# Patient Record
Sex: MALE | Race: WHITE | NOT HISPANIC OR LATINO | URBAN - NONMETROPOLITAN AREA
[De-identification: names, ages, dates, MRNs, and addresses within clinical notes are randomized per-mention and may not be internally consistent; named-entity substitution may affect disease eponyms.]

---

## 2019-01-27 ENCOUNTER — HOSPITAL ENCOUNTER (EMERGENCY)
Facility: HOSPITAL | Age: 39
Discharge: HOME | End: 2019-01-27
Attending: EMERGENCY MEDICINE | Admitting: EMERGENCY MEDICINE

## 2019-01-27 ENCOUNTER — APPOINTMENT (OUTPATIENT)
Dept: GENERAL RADIOLOGY | Facility: HOSPITAL | Age: 39
End: 2019-01-27

## 2019-01-27 ENCOUNTER — APPOINTMENT (OUTPATIENT)
Dept: CT IMAGING | Facility: HOSPITAL | Age: 39
End: 2019-01-27

## 2019-01-27 VITALS
DIASTOLIC BLOOD PRESSURE: 65 MMHG | TEMPERATURE: 98.4 F | HEIGHT: 70 IN | RESPIRATION RATE: 18 BRPM | WEIGHT: 190 LBS | HEART RATE: 71 BPM | BODY MASS INDEX: 27.2 KG/M2 | SYSTOLIC BLOOD PRESSURE: 127 MMHG | OXYGEN SATURATION: 97 %

## 2019-01-27 DIAGNOSIS — R07.9 CHEST PAIN, UNSPECIFIED TYPE: Primary | ICD-10-CM

## 2019-01-27 DIAGNOSIS — I15.9 SECONDARY HYPERTENSION: ICD-10-CM

## 2019-01-27 DIAGNOSIS — R06.00 DYSPNEA, UNSPECIFIED TYPE: ICD-10-CM

## 2019-01-27 LAB
AMPHET+METHAMPHET UR QL: NEGATIVE
ANION GAP SERPL CALCULATED.3IONS-SCNC: 9 MMOL/L (ref 4–13)
APTT PPP: 25.5 SECONDS (ref 24.1–34.8)
BARBITURATES UR QL SCN: NEGATIVE
BASOPHILS # BLD AUTO: 0.03 10*3/MM3 (ref 0–0.2)
BASOPHILS NFR BLD AUTO: 0.5 % (ref 0–2)
BENZODIAZ UR QL SCN: NEGATIVE
BUN BLD-MCNC: 10 MG/DL (ref 5–21)
BUN/CREAT SERPL: 16.7 (ref 7–25)
CALCIUM SPEC-SCNC: 9.2 MG/DL (ref 8.4–10.4)
CANNABINOIDS SERPL QL: NEGATIVE
CHLORIDE SERPL-SCNC: 108 MMOL/L (ref 98–110)
CK SERPL-CCNC: 235 U/L (ref 0–203)
CO2 SERPL-SCNC: 24 MMOL/L (ref 24–31)
COCAINE UR QL: NEGATIVE
CREAT BLD-MCNC: 0.6 MG/DL (ref 0.5–1.4)
D DIMER PPP FEU-MCNC: 0.25 MG/L (FEU) (ref 0–0.5)
DEPRECATED RDW RBC AUTO: 38.2 FL (ref 40–54)
EOSINOPHIL # BLD AUTO: 0.15 10*3/MM3 (ref 0–0.7)
EOSINOPHIL NFR BLD AUTO: 2.6 % (ref 0–4)
ERYTHROCYTE [DISTWIDTH] IN BLOOD BY AUTOMATED COUNT: 12.4 % (ref 12–15)
GFR SERPL CREATININE-BSD FRML MDRD: 148 ML/MIN/1.73
GLUCOSE BLD-MCNC: 97 MG/DL (ref 70–100)
HCT VFR BLD AUTO: 36.7 % (ref 40–52)
HGB BLD-MCNC: 12.8 G/DL (ref 14–18)
INR PPP: 0.97 (ref 0.91–1.09)
LYMPHOCYTES # BLD AUTO: 0.88 10*3/MM3 (ref 0.72–4.86)
LYMPHOCYTES NFR BLD AUTO: 15.3 % (ref 15–45)
MAGNESIUM SERPL-MCNC: 2 MG/DL (ref 1.4–2.2)
MCH RBC QN AUTO: 29.7 PG (ref 28–32)
MCHC RBC AUTO-ENTMCNC: 34.9 G/DL (ref 33–36)
MCV RBC AUTO: 85.2 FL (ref 82–95)
METHADONE UR QL SCN: NEGATIVE
MONOCYTES # BLD AUTO: 0.38 10*3/MM3 (ref 0.19–1.3)
MONOCYTES NFR BLD AUTO: 6.6 % (ref 4–12)
MYOGLOBIN SERPL-MCNC: 23.1 NG/ML (ref 0–110)
NEUTROPHILS # BLD AUTO: 4.31 10*3/MM3 (ref 1.87–8.4)
NEUTROPHILS NFR BLD AUTO: 74.7 % (ref 39–78)
NT-PROBNP SERPL-MCNC: 25 PG/ML (ref 0–450)
OPIATES UR QL: NEGATIVE
PCP UR QL SCN: NEGATIVE
PLATELET # BLD AUTO: 141 10*3/MM3 (ref 130–400)
PMV BLD AUTO: 13 FL (ref 6–12)
POTASSIUM BLD-SCNC: 3.8 MMOL/L (ref 3.5–5.3)
PROTHROMBIN TIME: 13.2 SECONDS (ref 11.9–14.6)
RBC # BLD AUTO: 4.31 10*6/MM3 (ref 4.8–5.9)
SODIUM BLD-SCNC: 141 MMOL/L (ref 135–145)
T4 FREE SERPL-MCNC: 1.23 NG/DL (ref 0.78–2.19)
TROPONIN I SERPL-MCNC: <0.012 NG/ML (ref 0–0.03)
TROPONIN I SERPL-MCNC: <0.012 NG/ML (ref 0–0.03)
TSH SERPL DL<=0.05 MIU/L-ACNC: 0.54 MIU/ML (ref 0.47–4.68)
WBC NRBC COR # BLD: 5.77 10*3/MM3 (ref 4.8–10.8)

## 2019-01-27 PROCEDURE — 80307 DRUG TEST PRSMV CHEM ANLYZR: CPT | Performed by: EMERGENCY MEDICINE

## 2019-01-27 PROCEDURE — 84443 ASSAY THYROID STIM HORMONE: CPT | Performed by: EMERGENCY MEDICINE

## 2019-01-27 PROCEDURE — 99284 EMERGENCY DEPT VISIT MOD MDM: CPT

## 2019-01-27 PROCEDURE — 83874 ASSAY OF MYOGLOBIN: CPT | Performed by: EMERGENCY MEDICINE

## 2019-01-27 PROCEDURE — 85730 THROMBOPLASTIN TIME PARTIAL: CPT | Performed by: EMERGENCY MEDICINE

## 2019-01-27 PROCEDURE — 84484 ASSAY OF TROPONIN QUANT: CPT | Performed by: EMERGENCY MEDICINE

## 2019-01-27 PROCEDURE — 83735 ASSAY OF MAGNESIUM: CPT | Performed by: EMERGENCY MEDICINE

## 2019-01-27 PROCEDURE — 93010 ELECTROCARDIOGRAM REPORT: CPT | Performed by: INTERNAL MEDICINE

## 2019-01-27 PROCEDURE — 70450 CT HEAD/BRAIN W/O DYE: CPT

## 2019-01-27 PROCEDURE — 80048 BASIC METABOLIC PNL TOTAL CA: CPT | Performed by: EMERGENCY MEDICINE

## 2019-01-27 PROCEDURE — 82550 ASSAY OF CK (CPK): CPT | Performed by: EMERGENCY MEDICINE

## 2019-01-27 PROCEDURE — 71045 X-RAY EXAM CHEST 1 VIEW: CPT

## 2019-01-27 PROCEDURE — 84439 ASSAY OF FREE THYROXINE: CPT | Performed by: EMERGENCY MEDICINE

## 2019-01-27 PROCEDURE — 85025 COMPLETE CBC W/AUTO DIFF WBC: CPT | Performed by: EMERGENCY MEDICINE

## 2019-01-27 PROCEDURE — 93005 ELECTROCARDIOGRAM TRACING: CPT | Performed by: EMERGENCY MEDICINE

## 2019-01-27 PROCEDURE — 36415 COLL VENOUS BLD VENIPUNCTURE: CPT | Performed by: EMERGENCY MEDICINE

## 2019-01-27 PROCEDURE — 85610 PROTHROMBIN TIME: CPT | Performed by: EMERGENCY MEDICINE

## 2019-01-27 PROCEDURE — 85379 FIBRIN DEGRADATION QUANT: CPT | Performed by: EMERGENCY MEDICINE

## 2019-01-27 PROCEDURE — 83880 ASSAY OF NATRIURETIC PEPTIDE: CPT | Performed by: EMERGENCY MEDICINE

## 2019-01-27 RX ORDER — SODIUM CHLORIDE 0.9 % (FLUSH) 0.9 %
10 SYRINGE (ML) INJECTION AS NEEDED
Status: DISCONTINUED | OUTPATIENT
Start: 2019-01-27 | End: 2019-01-27 | Stop reason: HOSPADM

## 2024-06-29 ENCOUNTER — APPOINTMENT (OUTPATIENT)
Dept: GENERAL RADIOLOGY | Facility: HOSPITAL | Age: 44
End: 2024-06-29
Payer: COMMERCIAL

## 2024-06-29 ENCOUNTER — APPOINTMENT (OUTPATIENT)
Dept: CT IMAGING | Facility: HOSPITAL | Age: 44
End: 2024-06-29
Payer: COMMERCIAL

## 2024-06-29 ENCOUNTER — HOSPITAL ENCOUNTER (OUTPATIENT)
Facility: HOSPITAL | Age: 44
Setting detail: OBSERVATION
Discharge: HOME | End: 2024-07-02
Attending: EMERGENCY MEDICINE | Admitting: INTERNAL MEDICINE
Payer: COMMERCIAL

## 2024-06-29 DIAGNOSIS — R07.9 CHEST PAIN, UNSPECIFIED TYPE: Primary | ICD-10-CM

## 2024-06-29 DIAGNOSIS — R79.89 ELEVATED TROPONIN: ICD-10-CM

## 2024-06-29 DIAGNOSIS — I10 PRIMARY HYPERTENSION: ICD-10-CM

## 2024-06-29 DIAGNOSIS — Z78.9 ALCOHOL USE: ICD-10-CM

## 2024-06-29 DIAGNOSIS — F10.10 ALCOHOL ABUSE: ICD-10-CM

## 2024-06-29 PROBLEM — I25.110 CORONARY ARTERY DISEASE INVOLVING NATIVE CORONARY ARTERY WITH UNSTABLE ANGINA PECTORIS: Status: ACTIVE | Noted: 2024-06-29

## 2024-06-29 PROBLEM — F43.10 PTSD (POST-TRAUMATIC STRESS DISORDER): Status: ACTIVE | Noted: 2024-06-29

## 2024-06-29 PROBLEM — F41.1 GAD (GENERALIZED ANXIETY DISORDER): Status: ACTIVE | Noted: 2024-06-29

## 2024-06-29 PROBLEM — G40.909 SEIZURE DISORDER: Status: ACTIVE | Noted: 2024-06-29

## 2024-06-29 PROBLEM — E11.65 TYPE 2 DIABETES MELLITUS WITH HYPERGLYCEMIA, WITH LONG-TERM CURRENT USE OF INSULIN: Status: ACTIVE | Noted: 2024-06-29

## 2024-06-29 PROBLEM — S06.9XAA TBI (TRAUMATIC BRAIN INJURY): Status: ACTIVE | Noted: 2024-06-29

## 2024-06-29 PROBLEM — Z79.4 TYPE 2 DIABETES MELLITUS WITH HYPERGLYCEMIA, WITH LONG-TERM CURRENT USE OF INSULIN: Status: ACTIVE | Noted: 2024-06-29

## 2024-06-29 PROBLEM — Z91.199 HISTORY OF NONCOMPLIANCE WITH MEDICAL TREATMENT: Status: ACTIVE | Noted: 2024-06-29

## 2024-06-29 LAB
ALBUMIN SERPL-MCNC: 4 G/DL (ref 3.5–5.2)
ALBUMIN/GLOB SERPL: 1.4 G/DL
ALP SERPL-CCNC: 69 U/L (ref 39–117)
ALT SERPL W P-5'-P-CCNC: 52 U/L (ref 1–41)
AMPHET+METHAMPHET UR QL: NEGATIVE
AMPHETAMINES UR QL: NEGATIVE
ANION GAP SERPL CALCULATED.3IONS-SCNC: 17 MMOL/L (ref 5–15)
AST SERPL-CCNC: 48 U/L (ref 1–40)
BARBITURATES UR QL SCN: NEGATIVE
BASOPHILS # BLD AUTO: 0.06 10*3/MM3 (ref 0–0.2)
BASOPHILS NFR BLD AUTO: 1.3 % (ref 0–1.5)
BENZODIAZ UR QL SCN: NEGATIVE
BILIRUB SERPL-MCNC: 0.3 MG/DL (ref 0–1.2)
BUN SERPL-MCNC: 11 MG/DL (ref 6–20)
BUN/CREAT SERPL: 12.5 (ref 7–25)
BUPRENORPHINE SERPL-MCNC: NEGATIVE NG/ML
CALCIUM SPEC-SCNC: 8.6 MG/DL (ref 8.6–10.5)
CANNABINOIDS SERPL QL: NEGATIVE
CHLORIDE SERPL-SCNC: 98 MMOL/L (ref 98–107)
CO2 SERPL-SCNC: 20 MMOL/L (ref 22–29)
COCAINE UR QL: NEGATIVE
CREAT SERPL-MCNC: 0.88 MG/DL (ref 0.76–1.27)
D DIMER PPP FEU-MCNC: 0.27 MCGFEU/ML (ref 0–0.5)
DEPRECATED RDW RBC AUTO: 49.8 FL (ref 37–54)
EGFRCR SERPLBLD CKD-EPI 2021: 107.4 ML/MIN/1.73
EOSINOPHIL # BLD AUTO: 0.08 10*3/MM3 (ref 0–0.4)
EOSINOPHIL NFR BLD AUTO: 1.7 % (ref 0.3–6.2)
ERYTHROCYTE [DISTWIDTH] IN BLOOD BY AUTOMATED COUNT: 14.5 % (ref 12.3–15.4)
ETHANOL BLD-MCNC: 194 MG/DL (ref 0–10)
FENTANYL UR-MCNC: NEGATIVE NG/ML
GEN 5 2HR TROPONIN T REFLEX: 33 NG/L
GLOBULIN UR ELPH-MCNC: 2.9 GM/DL
GLUCOSE BLDC GLUCOMTR-MCNC: 278 MG/DL (ref 70–130)
GLUCOSE SERPL-MCNC: 422 MG/DL (ref 65–99)
HAV IGM SERPL QL IA: NORMAL
HBA1C MFR BLD: 7.9 % (ref 4.8–5.6)
HBV CORE IGM SERPL QL IA: NORMAL
HBV SURFACE AG SERPL QL IA: NORMAL
HCT VFR BLD AUTO: 50.4 % (ref 37.5–51)
HCV AB SER QL: NORMAL
HGB BLD-MCNC: 16.8 G/DL (ref 13–17.7)
HOLD SPECIMEN: NORMAL
IMM GRANULOCYTES # BLD AUTO: 0.05 10*3/MM3 (ref 0–0.05)
IMM GRANULOCYTES NFR BLD AUTO: 1.1 % (ref 0–0.5)
LIPASE SERPL-CCNC: 24 U/L (ref 13–60)
LYMPHOCYTES # BLD AUTO: 1.38 10*3/MM3 (ref 0.7–3.1)
LYMPHOCYTES NFR BLD AUTO: 29 % (ref 19.6–45.3)
MCH RBC QN AUTO: 31.5 PG (ref 26.6–33)
MCHC RBC AUTO-ENTMCNC: 33.3 G/DL (ref 31.5–35.7)
MCV RBC AUTO: 94.6 FL (ref 79–97)
METHADONE UR QL SCN: NEGATIVE
MONOCYTES # BLD AUTO: 0.34 10*3/MM3 (ref 0.1–0.9)
MONOCYTES NFR BLD AUTO: 7.1 % (ref 5–12)
NEUTROPHILS NFR BLD AUTO: 2.85 10*3/MM3 (ref 1.7–7)
NEUTROPHILS NFR BLD AUTO: 59.8 % (ref 42.7–76)
NRBC BLD AUTO-RTO: 0 /100 WBC (ref 0–0.2)
NT-PROBNP SERPL-MCNC: <36 PG/ML (ref 0–450)
OPIATES UR QL: NEGATIVE
OXYCODONE UR QL SCN: NEGATIVE
PCP UR QL SCN: NEGATIVE
PHENYTOIN SERPL-MCNC: <0.8 MCG/ML (ref 10–20)
PLATELET # BLD AUTO: 175 10*3/MM3 (ref 140–450)
PMV BLD AUTO: 12.1 FL (ref 6–12)
POTASSIUM SERPL-SCNC: 4.6 MMOL/L (ref 3.5–5.2)
PROT SERPL-MCNC: 6.9 G/DL (ref 6–8.5)
RBC # BLD AUTO: 5.33 10*6/MM3 (ref 4.14–5.8)
SODIUM SERPL-SCNC: 135 MMOL/L (ref 136–145)
TRICYCLICS UR QL SCN: NEGATIVE
TROPONIN T DELTA: -5 NG/L
TROPONIN T SERPL HS-MCNC: 38 NG/L
TSH SERPL DL<=0.05 MIU/L-ACNC: 2.17 UIU/ML (ref 0.27–4.2)
WBC NRBC COR # BLD AUTO: 4.76 10*3/MM3 (ref 3.4–10.8)
WHOLE BLOOD HOLD COAG: NORMAL
WHOLE BLOOD HOLD SPECIMEN: NORMAL

## 2024-06-29 PROCEDURE — 25010000002 ONDANSETRON PER 1 MG: Performed by: EMERGENCY MEDICINE

## 2024-06-29 PROCEDURE — 82077 ASSAY SPEC XCP UR&BREATH IA: CPT | Performed by: EMERGENCY MEDICINE

## 2024-06-29 PROCEDURE — 25010000002 HEPARIN (PORCINE) PER 1000 UNITS: Performed by: NURSE PRACTITIONER

## 2024-06-29 PROCEDURE — 25010000002 LORAZEPAM PER 2 MG: Performed by: EMERGENCY MEDICINE

## 2024-06-29 PROCEDURE — 93005 ELECTROCARDIOGRAM TRACING: CPT | Performed by: EMERGENCY MEDICINE

## 2024-06-29 PROCEDURE — 80307 DRUG TEST PRSMV CHEM ANLYZR: CPT | Performed by: EMERGENCY MEDICINE

## 2024-06-29 PROCEDURE — 71275 CT ANGIOGRAPHY CHEST: CPT

## 2024-06-29 PROCEDURE — 96374 THER/PROPH/DIAG INJ IV PUSH: CPT

## 2024-06-29 PROCEDURE — 25010000002 HYDROMORPHONE PER 4 MG: Performed by: NURSE PRACTITIONER

## 2024-06-29 PROCEDURE — 85379 FIBRIN DEGRADATION QUANT: CPT | Performed by: EMERGENCY MEDICINE

## 2024-06-29 PROCEDURE — G0378 HOSPITAL OBSERVATION PER HR: HCPCS

## 2024-06-29 PROCEDURE — 99285 EMERGENCY DEPT VISIT HI MDM: CPT

## 2024-06-29 PROCEDURE — 80053 COMPREHEN METABOLIC PANEL: CPT | Performed by: EMERGENCY MEDICINE

## 2024-06-29 PROCEDURE — 25010000002 THIAMINE HCL 200 MG/2ML SOLUTION: Performed by: NURSE PRACTITIONER

## 2024-06-29 PROCEDURE — 80185 ASSAY OF PHENYTOIN TOTAL: CPT | Performed by: FAMILY MEDICINE

## 2024-06-29 PROCEDURE — 25510000001 IOPAMIDOL PER 1 ML: Performed by: EMERGENCY MEDICINE

## 2024-06-29 PROCEDURE — 25010000002 ONDANSETRON PER 1 MG: Performed by: NURSE PRACTITIONER

## 2024-06-29 PROCEDURE — 25010000002 MIDAZOLAM PER 1 MG: Performed by: NURSE PRACTITIONER

## 2024-06-29 PROCEDURE — 83690 ASSAY OF LIPASE: CPT | Performed by: EMERGENCY MEDICINE

## 2024-06-29 PROCEDURE — 82948 REAGENT STRIP/BLOOD GLUCOSE: CPT

## 2024-06-29 PROCEDURE — 96375 TX/PRO/DX INJ NEW DRUG ADDON: CPT

## 2024-06-29 PROCEDURE — 84443 ASSAY THYROID STIM HORMONE: CPT | Performed by: NURSE PRACTITIONER

## 2024-06-29 PROCEDURE — 84484 ASSAY OF TROPONIN QUANT: CPT | Performed by: EMERGENCY MEDICINE

## 2024-06-29 PROCEDURE — 96376 TX/PRO/DX INJ SAME DRUG ADON: CPT

## 2024-06-29 PROCEDURE — 99222 1ST HOSP IP/OBS MODERATE 55: CPT | Performed by: NURSE PRACTITIONER

## 2024-06-29 PROCEDURE — 83880 ASSAY OF NATRIURETIC PEPTIDE: CPT | Performed by: EMERGENCY MEDICINE

## 2024-06-29 PROCEDURE — 25010000002 MORPHINE PER 10 MG: Performed by: EMERGENCY MEDICINE

## 2024-06-29 PROCEDURE — 85025 COMPLETE CBC W/AUTO DIFF WBC: CPT | Performed by: EMERGENCY MEDICINE

## 2024-06-29 PROCEDURE — 63710000001 INSULIN LISPRO (HUMAN) PER 5 UNITS: Performed by: NURSE PRACTITIONER

## 2024-06-29 PROCEDURE — 80074 ACUTE HEPATITIS PANEL: CPT | Performed by: NURSE PRACTITIONER

## 2024-06-29 PROCEDURE — 25810000003 SODIUM CHLORIDE 0.9 % SOLUTION: Performed by: EMERGENCY MEDICINE

## 2024-06-29 PROCEDURE — 36415 COLL VENOUS BLD VENIPUNCTURE: CPT

## 2024-06-29 PROCEDURE — 63710000001 INSULIN GLARGINE PER 5 UNITS: Performed by: NURSE PRACTITIONER

## 2024-06-29 PROCEDURE — 83036 HEMOGLOBIN GLYCOSYLATED A1C: CPT | Performed by: FAMILY MEDICINE

## 2024-06-29 PROCEDURE — 71045 X-RAY EXAM CHEST 1 VIEW: CPT

## 2024-06-29 PROCEDURE — 63710000001 INSULIN REGULAR HUMAN PER 5 UNITS: Performed by: EMERGENCY MEDICINE

## 2024-06-29 RX ORDER — ONDANSETRON 2 MG/ML
4 INJECTION INTRAMUSCULAR; INTRAVENOUS ONCE AS NEEDED
Status: COMPLETED | OUTPATIENT
Start: 2024-06-29 | End: 2024-06-29

## 2024-06-29 RX ORDER — LORAZEPAM 1 MG/1
1 TABLET ORAL
Status: DISCONTINUED | OUTPATIENT
Start: 2024-06-29 | End: 2024-07-02 | Stop reason: HOSPADM

## 2024-06-29 RX ORDER — SODIUM CHLORIDE 0.9 % (FLUSH) 0.9 %
10 SYRINGE (ML) INJECTION AS NEEDED
Status: DISCONTINUED | OUTPATIENT
Start: 2024-06-29 | End: 2024-07-02 | Stop reason: HOSPADM

## 2024-06-29 RX ORDER — ONDANSETRON 2 MG/ML
4 INJECTION INTRAMUSCULAR; INTRAVENOUS EVERY 6 HOURS PRN
Status: DISCONTINUED | OUTPATIENT
Start: 2024-06-29 | End: 2024-07-02 | Stop reason: HOSPADM

## 2024-06-29 RX ORDER — HYDROMORPHONE HYDROCHLORIDE 1 MG/ML
0.5 INJECTION, SOLUTION INTRAMUSCULAR; INTRAVENOUS; SUBCUTANEOUS ONCE
Status: COMPLETED | OUTPATIENT
Start: 2024-06-29 | End: 2024-06-29

## 2024-06-29 RX ORDER — INSULIN LISPRO 100 [IU]/ML
2-9 INJECTION, SOLUTION INTRAVENOUS; SUBCUTANEOUS
Status: DISCONTINUED | OUTPATIENT
Start: 2024-06-29 | End: 2024-07-02 | Stop reason: HOSPADM

## 2024-06-29 RX ORDER — PHENYTOIN SODIUM 100 MG/1
100 CAPSULE, EXTENDED RELEASE ORAL EVERY 6 HOURS SCHEDULED
Status: DISCONTINUED | OUTPATIENT
Start: 2024-06-30 | End: 2024-07-02 | Stop reason: HOSPADM

## 2024-06-29 RX ORDER — CLONIDINE HYDROCHLORIDE 0.1 MG/1
0.1 TABLET ORAL EVERY 4 HOURS PRN
Status: DISCONTINUED | OUTPATIENT
Start: 2024-06-29 | End: 2024-07-02 | Stop reason: HOSPADM

## 2024-06-29 RX ORDER — PHENYTOIN SODIUM 100 MG/1
400 CAPSULE, EXTENDED RELEASE ORAL ONCE
Status: COMPLETED | OUTPATIENT
Start: 2024-06-29 | End: 2024-06-29

## 2024-06-29 RX ORDER — NEBIVOLOL 10 MG/1
10 TABLET ORAL DAILY
Status: DISCONTINUED | OUTPATIENT
Start: 2024-06-30 | End: 2024-07-02 | Stop reason: HOSPADM

## 2024-06-29 RX ORDER — BISACODYL 5 MG/1
5 TABLET, DELAYED RELEASE ORAL DAILY PRN
Status: DISCONTINUED | OUTPATIENT
Start: 2024-06-29 | End: 2024-07-02 | Stop reason: HOSPADM

## 2024-06-29 RX ORDER — HEPARIN SODIUM 5000 [USP'U]/ML
5000 INJECTION, SOLUTION INTRAVENOUS; SUBCUTANEOUS EVERY 8 HOURS SCHEDULED
Status: DISCONTINUED | OUTPATIENT
Start: 2024-06-29 | End: 2024-07-02 | Stop reason: HOSPADM

## 2024-06-29 RX ORDER — IBUPROFEN 600 MG/1
1 TABLET ORAL
Status: DISCONTINUED | OUTPATIENT
Start: 2024-06-29 | End: 2024-07-02 | Stop reason: HOSPADM

## 2024-06-29 RX ORDER — SODIUM CHLORIDE 9 MG/ML
40 INJECTION, SOLUTION INTRAVENOUS AS NEEDED
Status: DISCONTINUED | OUTPATIENT
Start: 2024-06-29 | End: 2024-07-02 | Stop reason: HOSPADM

## 2024-06-29 RX ORDER — HYDROXYZINE HYDROCHLORIDE 25 MG/1
50 TABLET, FILM COATED ORAL EVERY 6 HOURS PRN
Status: DISCONTINUED | OUTPATIENT
Start: 2024-06-29 | End: 2024-07-02 | Stop reason: HOSPADM

## 2024-06-29 RX ORDER — LORAZEPAM 1 MG/1
2 TABLET ORAL
Status: DISCONTINUED | OUTPATIENT
Start: 2024-06-29 | End: 2024-07-02 | Stop reason: HOSPADM

## 2024-06-29 RX ORDER — MIDAZOLAM HYDROCHLORIDE 1 MG/ML
4 INJECTION INTRAMUSCULAR; INTRAVENOUS
Status: DISCONTINUED | OUTPATIENT
Start: 2024-06-29 | End: 2024-07-02 | Stop reason: HOSPADM

## 2024-06-29 RX ORDER — UREA 10 %
10 LOTION (ML) TOPICAL NIGHTLY PRN
Status: DISCONTINUED | OUTPATIENT
Start: 2024-06-29 | End: 2024-07-02 | Stop reason: HOSPADM

## 2024-06-29 RX ORDER — MIDAZOLAM HYDROCHLORIDE 1 MG/ML
2 INJECTION INTRAMUSCULAR; INTRAVENOUS
Status: DISCONTINUED | OUTPATIENT
Start: 2024-06-29 | End: 2024-07-02 | Stop reason: HOSPADM

## 2024-06-29 RX ORDER — ACETAMINOPHEN 650 MG/1
650 SUPPOSITORY RECTAL EVERY 4 HOURS PRN
Status: DISCONTINUED | OUTPATIENT
Start: 2024-06-29 | End: 2024-07-02 | Stop reason: HOSPADM

## 2024-06-29 RX ORDER — ASPIRIN 81 MG/1
324 TABLET, CHEWABLE ORAL ONCE
Status: COMPLETED | OUTPATIENT
Start: 2024-06-29 | End: 2024-06-29

## 2024-06-29 RX ORDER — DEXTROSE MONOHYDRATE 25 G/50ML
25 INJECTION, SOLUTION INTRAVENOUS
Status: DISCONTINUED | OUTPATIENT
Start: 2024-06-29 | End: 2024-07-02 | Stop reason: HOSPADM

## 2024-06-29 RX ORDER — NICOTINE POLACRILEX 4 MG
15 LOZENGE BUCCAL
Status: DISCONTINUED | OUTPATIENT
Start: 2024-06-29 | End: 2024-07-02 | Stop reason: HOSPADM

## 2024-06-29 RX ORDER — DIAZEPAM 5 MG/1
5 TABLET ORAL EVERY 6 HOURS SCHEDULED
Status: DISCONTINUED | OUTPATIENT
Start: 2024-06-30 | End: 2024-06-29

## 2024-06-29 RX ORDER — AMOXICILLIN 250 MG
2 CAPSULE ORAL 2 TIMES DAILY
Status: DISCONTINUED | OUTPATIENT
Start: 2024-06-29 | End: 2024-07-02 | Stop reason: HOSPADM

## 2024-06-29 RX ORDER — ATORVASTATIN CALCIUM 20 MG/1
20 TABLET, FILM COATED ORAL NIGHTLY
Status: DISCONTINUED | OUTPATIENT
Start: 2024-06-29 | End: 2024-07-02 | Stop reason: HOSPADM

## 2024-06-29 RX ORDER — ASPIRIN 81 MG/1
81 TABLET, CHEWABLE ORAL DAILY
COMMUNITY

## 2024-06-29 RX ORDER — DIAZEPAM 5 MG/1
2.5 TABLET ORAL EVERY 6 HOURS
Status: DISCONTINUED | OUTPATIENT
Start: 2024-07-02 | End: 2024-06-29

## 2024-06-29 RX ORDER — MORPHINE SULFATE 2 MG/ML
2 INJECTION, SOLUTION INTRAMUSCULAR; INTRAVENOUS
Status: COMPLETED | OUTPATIENT
Start: 2024-06-29 | End: 2024-06-29

## 2024-06-29 RX ORDER — PHENYTOIN SODIUM 100 MG/1
100 CAPSULE, EXTENDED RELEASE ORAL 4 TIMES DAILY
COMMUNITY
End: 2024-07-02 | Stop reason: HOSPADM

## 2024-06-29 RX ORDER — MULTIPLE VITAMINS W/ MINERALS TAB 9MG-400MCG
1 TAB ORAL DAILY
Status: DISCONTINUED | OUTPATIENT
Start: 2024-06-30 | End: 2024-07-02 | Stop reason: HOSPADM

## 2024-06-29 RX ORDER — SODIUM CHLORIDE 0.9 % (FLUSH) 0.9 %
10 SYRINGE (ML) INJECTION EVERY 12 HOURS SCHEDULED
Status: DISCONTINUED | OUTPATIENT
Start: 2024-06-29 | End: 2024-07-02 | Stop reason: HOSPADM

## 2024-06-29 RX ORDER — POLYETHYLENE GLYCOL 3350 17 G/17G
17 POWDER, FOR SOLUTION ORAL DAILY PRN
Status: DISCONTINUED | OUTPATIENT
Start: 2024-06-29 | End: 2024-07-02 | Stop reason: HOSPADM

## 2024-06-29 RX ORDER — THIAMINE HYDROCHLORIDE 100 MG/ML
200 INJECTION, SOLUTION INTRAMUSCULAR; INTRAVENOUS EVERY 8 HOURS SCHEDULED
Status: DISCONTINUED | OUTPATIENT
Start: 2024-06-29 | End: 2024-07-02 | Stop reason: HOSPADM

## 2024-06-29 RX ORDER — FOLIC ACID 1 MG/1
1 TABLET ORAL DAILY
Status: DISCONTINUED | OUTPATIENT
Start: 2024-06-30 | End: 2024-07-02 | Stop reason: HOSPADM

## 2024-06-29 RX ORDER — DIAZEPAM 5 MG/1
2.5 TABLET ORAL EVERY 6 HOURS
Status: DISCONTINUED | OUTPATIENT
Start: 2024-07-01 | End: 2024-06-30

## 2024-06-29 RX ORDER — BISACODYL 10 MG
10 SUPPOSITORY, RECTAL RECTAL DAILY PRN
Status: DISCONTINUED | OUTPATIENT
Start: 2024-06-29 | End: 2024-07-02 | Stop reason: HOSPADM

## 2024-06-29 RX ORDER — ACETAMINOPHEN 325 MG/1
650 TABLET ORAL EVERY 4 HOURS PRN
Status: DISCONTINUED | OUTPATIENT
Start: 2024-06-29 | End: 2024-07-02 | Stop reason: HOSPADM

## 2024-06-29 RX ORDER — ASPIRIN 81 MG/1
81 TABLET, CHEWABLE ORAL DAILY
Status: DISCONTINUED | OUTPATIENT
Start: 2024-06-30 | End: 2024-07-02 | Stop reason: HOSPADM

## 2024-06-29 RX ORDER — LORAZEPAM 2 MG/ML
1 INJECTION INTRAMUSCULAR ONCE
Status: COMPLETED | OUTPATIENT
Start: 2024-06-29 | End: 2024-06-29

## 2024-06-29 RX ORDER — PHENYTOIN SODIUM 100 MG/1
300 CAPSULE, EXTENDED RELEASE ORAL
Status: COMPLETED | OUTPATIENT
Start: 2024-06-29 | End: 2024-06-30

## 2024-06-29 RX ORDER — ACETAMINOPHEN 160 MG/5ML
650 SOLUTION ORAL EVERY 4 HOURS PRN
Status: DISCONTINUED | OUTPATIENT
Start: 2024-06-29 | End: 2024-07-02 | Stop reason: HOSPADM

## 2024-06-29 RX ORDER — ONDANSETRON 4 MG/1
4 TABLET, ORALLY DISINTEGRATING ORAL EVERY 6 HOURS PRN
Status: DISCONTINUED | OUTPATIENT
Start: 2024-06-29 | End: 2024-07-02 | Stop reason: HOSPADM

## 2024-06-29 RX ORDER — OXYCODONE HYDROCHLORIDE AND ACETAMINOPHEN 5; 325 MG/1; MG/1
1 TABLET ORAL EVERY 4 HOURS PRN
Status: DISCONTINUED | OUTPATIENT
Start: 2024-06-29 | End: 2024-06-30

## 2024-06-29 RX ORDER — DIAZEPAM 5 MG/1
5 TABLET ORAL EVERY 6 HOURS SCHEDULED
Status: DISCONTINUED | OUTPATIENT
Start: 2024-06-29 | End: 2024-06-30

## 2024-06-29 RX ADMIN — EXTENDED PHENYTOIN SODIUM 400 MG: 100 CAPSULE ORAL at 21:05

## 2024-06-29 RX ADMIN — ASPIRIN 324 MG: 81 TABLET, CHEWABLE ORAL at 13:52

## 2024-06-29 RX ADMIN — NITROGLYCERIN 1 INCH: 20 OINTMENT TOPICAL at 17:09

## 2024-06-29 RX ADMIN — ONDANSETRON 4 MG: 2 INJECTION INTRAMUSCULAR; INTRAVENOUS at 14:35

## 2024-06-29 RX ADMIN — MORPHINE SULFATE 2 MG: 2 INJECTION, SOLUTION INTRAMUSCULAR; INTRAVENOUS at 15:26

## 2024-06-29 RX ADMIN — HYDROMORPHONE HYDROCHLORIDE 0.5 MG: 1 INJECTION, SOLUTION INTRAMUSCULAR; INTRAVENOUS; SUBCUTANEOUS at 20:30

## 2024-06-29 RX ADMIN — SODIUM CHLORIDE 500 ML: 9 INJECTION, SOLUTION INTRAVENOUS at 15:00

## 2024-06-29 RX ADMIN — IOPAMIDOL 80 ML: 755 INJECTION, SOLUTION INTRAVENOUS at 15:38

## 2024-06-29 RX ADMIN — ONDANSETRON 4 MG: 2 INJECTION INTRAMUSCULAR; INTRAVENOUS at 22:41

## 2024-06-29 RX ADMIN — DIAZEPAM 5 MG: 5 TABLET ORAL at 21:05

## 2024-06-29 RX ADMIN — MORPHINE SULFATE 2 MG: 2 INJECTION, SOLUTION INTRAMUSCULAR; INTRAVENOUS at 14:35

## 2024-06-29 RX ADMIN — THIAMINE HYDROCHLORIDE 200 MG: 100 INJECTION, SOLUTION INTRAMUSCULAR; INTRAVENOUS at 21:04

## 2024-06-29 RX ADMIN — MORPHINE SULFATE 2 MG: 2 INJECTION, SOLUTION INTRAMUSCULAR; INTRAVENOUS at 17:10

## 2024-06-29 RX ADMIN — LORAZEPAM 1 MG: 2 INJECTION INTRAMUSCULAR; INTRAVENOUS at 14:12

## 2024-06-29 RX ADMIN — INSULIN GLARGINE 10 UNITS: 100 INJECTION, SOLUTION SUBCUTANEOUS at 21:05

## 2024-06-29 RX ADMIN — INSULIN HUMAN 6 UNITS: 100 INJECTION, SOLUTION PARENTERAL at 15:07

## 2024-06-29 RX ADMIN — Medication 10 ML: at 21:08

## 2024-06-29 RX ADMIN — INSULIN LISPRO 6 UNITS: 100 INJECTION, SOLUTION INTRAVENOUS; SUBCUTANEOUS at 21:05

## 2024-06-29 RX ADMIN — MIDAZOLAM HYDROCHLORIDE 2 MG: 1 INJECTION, SOLUTION INTRAMUSCULAR; INTRAVENOUS at 22:41

## 2024-06-29 RX ADMIN — EXTENDED PHENYTOIN SODIUM 300 MG: 100 CAPSULE ORAL at 23:29

## 2024-06-29 RX ADMIN — HEPARIN SODIUM 5000 UNITS: 5000 INJECTION INTRAVENOUS; SUBCUTANEOUS at 21:04

## 2024-06-29 RX ADMIN — ATORVASTATIN CALCIUM 20 MG: 20 TABLET, FILM COATED ORAL at 21:05

## 2024-06-29 RX ADMIN — SENNOSIDES AND DOCUSATE SODIUM 2 TABLET: 50; 8.6 TABLET ORAL at 21:05

## 2024-06-29 NOTE — ED PROVIDER NOTES
Subjective   History of Present Illness  Patient is a pleasant 44-year-old male with history of coronary disease, stents, PTSD is secondary to war injuries, and seizures.  He is prescribed Dilantin Xanax, and admits to a long history of alcoholism and anabolic steroid use.  Currently he is on multiple anabolic steroids and testosterone.  He is visiting from [place].  States that 2 weeks ago, upon arrival in the [place] decided to try to clean up by his wife and stopped taking his Xanax and also stopped taking his Dilantin.  Continue to drink alcohol but is trying to stop drinking alcohol also.  Over the last 1 days developed a left-sided chest pain and just a general unwell feeling.  It is described as a clamp sensation on his left chest.  It is worse with certain movements and also with a deep inspiration.  Does have history of coronary disease with 2 stents placed in the distant past.  Denies fever, chills, abdominal pain, vomiting, diarrhea, or other acute complaints.      Review of Systems   All other systems reviewed and are negative.      Past Medical History:   Diagnosis Date    Acute renal failure (ARF)     Hearing loss     Myocardial infarction     Night terror     PTSD (post-traumatic stress disorder)     Seizures        No Known Allergies    Past Surgical History:   Procedure Laterality Date    CORONARY ANGIOPLASTY WITH STENT PLACEMENT      FRACTURE SURGERY         History reviewed. No pertinent family history.    Social History     Socioeconomic History    Marital status: Single   Tobacco Use    Smoking status: Never    Smokeless tobacco: Current     Types: Snuff   Vaping Use    Vaping status: Never Used   Substance and Sexual Activity    Alcohol use: Yes     Comment: 1 liter vodka; admites to intermittent binging    Drug use: Defer    Sexual activity: Defer           Objective   Physical Exam  Vitals and nursing note reviewed.   Constitutional:       General: He is not in acute distress.  HENT:       Head: Normocephalic and atraumatic.   Eyes:      Conjunctiva/sclera: Conjunctivae normal.      Pupils: Pupils are equal, round, and reactive to light.   Neck:      Thyroid: No thyromegaly.   Cardiovascular:      Rate and Rhythm: Normal rate and regular rhythm.      Heart sounds: Normal heart sounds. No murmur heard.     No friction rub. No gallop.   Pulmonary:      Effort: Pulmonary effort is normal. No respiratory distress.      Breath sounds: Normal breath sounds.   Chest:      Chest wall: No tenderness.   Abdominal:      Palpations: Abdomen is soft.      Tenderness: There is no abdominal tenderness.   Musculoskeletal:         General: Normal range of motion.      Cervical back: Normal range of motion and neck supple.      Comments: Muscular build.  Consistent with anabolic steroid use   Lymphadenopathy:      Cervical: No cervical adenopathy.   Skin:     General: Skin is warm and dry.      Capillary Refill: Capillary refill takes less than 2 seconds.   Neurological:      General: No focal deficit present.      Mental Status: He is alert and oriented to person, place, and time.   Psychiatric:         Mood and Affect: Mood normal. Mood is not anxious.         Behavior: Behavior normal.         Procedures           ED Course  ED Course as of 06/29/24 2556   Sat Jun 29, 2024   [id number] Patient is requesting Dilaudid for his pain stating that the only thing that works for him and that the morphine only makes his vision blurry.  He was eating [employer] when he asked the nurse for this.  The nurse I requested that he not eat until he is cleared to eat/drink.  He said he did not care if he was allowed to eat and drink, he told the nurse that whether he was allowed to eat or drink was not the question.  He was going to eat and drink, regardless of the recommendation, because he is hungry. [CP]      ED Course User Index  [CP] Tc Grimes DO      Recent Results (from the past 24 hour(s))   ECG 12 Lead ED Triage Standing Order;  Chest Pain    Collection Time: 06/29/24  1:45 PM   Result Value Ref Range    QT Interval 306 ms    QTC Interval 410 ms   High Sensitivity Troponin T    Collection Time: 06/29/24  1:57 PM    Specimen: Blood   Result Value Ref Range    HS Troponin T 38 (H) <22 ng/L   Comprehensive Metabolic Panel    Collection Time: 06/29/24  1:57 PM    Specimen: Blood   Result Value Ref Range    Glucose 422 (C) 65 - 99 mg/dL    BUN 11 6 - 20 mg/dL    Creatinine 0.88 0.76 - 1.27 mg/dL    Sodium 135 (L) 136 - 145 mmol/L    Potassium 4.6 3.5 - 5.2 mmol/L    Chloride 98 98 - 107 mmol/L    CO2 20.0 (L) 22.0 - 29.0 mmol/L    Calcium 8.6 8.6 - 10.5 mg/dL    Total Protein 6.9 6.0 - 8.5 g/dL    Albumin 4.0 3.5 - 5.2 g/dL    ALT (SGPT) 52 (H) 1 - 41 U/L    AST (SGOT) 48 (H) 1 - 40 U/L    Alkaline Phosphatase 69 39 - 117 U/L    Total Bilirubin 0.3 0.0 - 1.2 mg/dL    Globulin 2.9 gm/dL    A/G Ratio 1.4 g/dL    BUN/Creatinine Ratio 12.5 7.0 - 25.0    Anion Gap 17.0 (H) 5.0 - 15.0 mmol/L    eGFR 107.4 >60.0 mL/min/1.73   Lipase    Collection Time: 06/29/24  1:57 PM    Specimen: Blood   Result Value Ref Range    Lipase 24 13 - 60 U/L   BNP    Collection Time: 06/29/24  1:57 PM    Specimen: Blood   Result Value Ref Range    proBNP <36.0 0.0 - 450.0 pg/mL   Green Top (Gel)    Collection Time: 06/29/24  1:57 PM   Result Value Ref Range    Extra Tube Hold for add-ons.    Lavender Top    Collection Time: 06/29/24  1:57 PM   Result Value Ref Range    Extra Tube hold for add-on    Gold Top - SST    Collection Time: 06/29/24  1:57 PM   Result Value Ref Range    Extra Tube Hold for add-ons.    Gray Top    Collection Time: 06/29/24  1:57 PM   Result Value Ref Range    Extra Tube Hold for add-ons.    Light Blue Top    Collection Time: 06/29/24  1:57 PM   Result Value Ref Range    Extra Tube Hold for add-ons.    CBC Auto Differential    Collection Time: 06/29/24  1:57 PM    Specimen: Blood   Result Value Ref Range    WBC 4.76 3.40 - 10.80 10*3/mm3    RBC 5.33  4.14 - 5.80 10*6/mm3    Hemoglobin 16.8 13.0 - 17.7 g/dL    Hematocrit 50.4 37.5 - 51.0 %    MCV 94.6 79.0 - 97.0 fL    MCH 31.5 26.6 - 33.0 pg    MCHC 33.3 31.5 - 35.7 g/dL    RDW 14.5 12.3 - 15.4 %    RDW-SD 49.8 37.0 - 54.0 fl    MPV 12.1 (H) 6.0 - 12.0 fL    Platelets 175 140 - 450 10*3/mm3    Neutrophil % 59.8 42.7 - 76.0 %    Lymphocyte % 29.0 19.6 - 45.3 %    Monocyte % 7.1 5.0 - 12.0 %    Eosinophil % 1.7 0.3 - 6.2 %    Basophil % 1.3 0.0 - 1.5 %    Immature Grans % 1.1 (H) 0.0 - 0.5 %    Neutrophils, Absolute 2.85 1.70 - 7.00 10*3/mm3    Lymphocytes, Absolute 1.38 0.70 - 3.10 10*3/mm3    Monocytes, Absolute 0.34 0.10 - 0.90 10*3/mm3    Eosinophils, Absolute 0.08 0.00 - 0.40 10*3/mm3    Basophils, Absolute 0.06 0.00 - 0.20 10*3/mm3    Immature Grans, Absolute 0.05 0.00 - 0.05 10*3/mm3    nRBC 0.0 0.0 - 0.2 /100 WBC   Ethanol    Collection Time: 06/29/24  1:57 PM    Specimen: Blood   Result Value Ref Range    Ethanol 194 (H) 0 - 10 mg/dL   D-dimer, Quantitative    Collection Time: 06/29/24  1:57 PM    Specimen: Blood   Result Value Ref Range    D-Dimer, Quantitative 0.27 0.00 - 0.50 MCGFEU/mL   Hemoglobin A1c    Collection Time: 06/29/24  1:57 PM    Specimen: Blood   Result Value Ref Range    Hemoglobin A1C 7.90 (H) 4.80 - 5.60 %   Hepatitis Panel, Acute    Collection Time: 06/29/24  1:57 PM    Specimen: Blood   Result Value Ref Range    Hepatitis B Surface Ag Non-Reactive Non-Reactive    Hep A IgM Non-Reactive Non-Reactive    Hep B C IgM Non-Reactive Non-Reactive    Hepatitis C Ab Non-Reactive Non-Reactive   Urine Drug Screen - Urine, Clean Catch    Collection Time: 06/29/24  2:16 PM    Specimen: Urine, Clean Catch   Result Value Ref Range    THC, Screen, Urine Negative Negative    Phencyclidine (PCP), Urine Negative Negative    Cocaine Screen, Urine Negative Negative    Methamphetamine, Ur Negative Negative    Opiate Screen Negative Negative    Amphetamine Screen, Urine Negative Negative    Benzodiazepine  Screen, Urine Negative Negative    Tricyclic Antidepressants Screen Negative Negative    Methadone Screen, Urine Negative Negative    Barbiturates Screen, Urine Negative Negative    Oxycodone Screen, Urine Negative Negative    Buprenorphine, Screen, Urine Negative Negative   Fentanyl, Urine - Urine, Clean Catch    Collection Time: 06/29/24  2:16 PM    Specimen: Urine, Clean Catch   Result Value Ref Range    Fentanyl, Urine Negative Negative   ECG 12 Lead ED Triage Standing Order; Chest Pain    Collection Time: 06/29/24  4:07 PM   Result Value Ref Range    QT Interval 326 ms    QTC Interval 414 ms   High Sensitivity Troponin T 2Hr    Collection Time: 06/29/24  4:12 PM    Specimen: Blood   Result Value Ref Range    HS Troponin T 33 (H) <22 ng/L    Troponin T Delta -5 (L) >=-4 - <+4 ng/L   Phenytoin Level, Total    Collection Time: 06/29/24  4:12 PM    Specimen: Blood   Result Value Ref Range    Phenytoin Level <0.8 (L) 10.0 - 20.0 mcg/mL   TSH Rfx On Abnormal To Free T4    Collection Time: 06/29/24  4:12 PM    Specimen: Blood   Result Value Ref Range    TSH 2.170 0.270 - 4.200 uIU/mL   POC Glucose Once    Collection Time: 06/29/24  8:31 PM    Specimen: Blood   Result Value Ref Range    Glucose 278 (H) 70 - 130 mg/dL     Note: In addition to lab results from this visit, the labs listed above may include labs taken at another facility or during a different encounter within the last 24 hours. Please correlate lab times with ED admission and discharge times for further clarification of the services performed during this visit.    CT Angiogram Chest   Final Result   Impression:   Limited examination due to suboptimal opacification of the pulmonary arteries. No evidence of pulmonary embolus through the proximal segmental arteries.            Electronically Signed: Yordan Hernández MD     6/29/2024 4:10 PM EDT     Workstation ID: [id number]      XR Chest 1 View   Final Result   Impression:      1. No acute cardiopulmonary  "disease.         Electronically Signed: Rajinder Lua MD     6/29/2024 2:27 PM EDT     Workstation ID: [id number]        Vitals:    06/29/24 1630 06/29/24 1700 06/29/24 1730 06/29/24 1817   BP: 139/75 (!) 209/86 114/86 153/75   BP Location:    Left arm   Patient Position:    Lying   Pulse: 101 102 107 104   Resp:    20   Temp:    97.7 °F (36.5 °C)   TempSrc:    Oral   SpO2: 97% 97% 97% 91%   Weight:    88.2 kg (194 lb 8 oz)   Height:    177.8 cm (70\")     Medications   sodium chloride 0.9 % flush 10 mL (has no administration in time range)   phenytoin ER (DILANTIN) capsule 400 mg (400 mg Oral Given 6/29/24 2105)     Followed by   phenytoin ER (DILANTIN) capsule 300 mg (has no administration in time range)     Followed by   phenytoin ER (DILANTIN) capsule 100 mg (has no administration in time range)   diazePAM (VALIUM) tablet 5 mg (5 mg Oral Given 6/29/24 2105)     Followed by   diazePAM (VALIUM) tablet 2.5 mg (has no administration in time range)   nebivolol (BYSTOLIC) tablet 10 mg (has no administration in time range)   sodium chloride 0.9 % flush 10 mL (10 mL Intravenous Given 6/29/24 2108)   sodium chloride 0.9 % flush 10 mL (has no administration in time range)   sodium chloride 0.9 % infusion 40 mL (has no administration in time range)   sennosides-docusate (PERICOLACE) 8.6-50 MG per tablet 2 tablet (2 tablets Oral Given 6/29/24 2105)     And   polyethylene glycol (MIRALAX) packet 17 g (has no administration in time range)     And   bisacodyl (DULCOLAX) EC tablet 5 mg (has no administration in time range)     And   bisacodyl (DULCOLAX) suppository 10 mg (has no administration in time range)   dextrose (GLUTOSE) oral gel 15 g (has no administration in time range)   dextrose (D50W) (25 g/50 mL) IV injection 25 g (has no administration in time range)   glucagon (GLUCAGEN) injection 1 mg (has no administration in time range)   insulin glargine (LANTUS, SEMGLEE) injection 10 Units (10 Units Subcutaneous Given 6/29/24 " 2105)   Insulin Lispro (humaLOG) injection 2-9 Units (6 Units Subcutaneous Given 6/29/24 2105)   heparin (porcine) 5000 UNIT/ML injection 5,000 Units (5,000 Units Subcutaneous Given 6/29/24 2104)   atorvastatin (LIPITOR) tablet 20 mg (20 mg Oral Given 6/29/24 2105)   nitroglycerin (NITROSTAT) ointment 0.5 inch (has no administration in time range)   acetaminophen (TYLENOL) tablet 650 mg (has no administration in time range)     Or   acetaminophen (TYLENOL) 160 MG/5ML oral solution 650 mg (has no administration in time range)     Or   acetaminophen (TYLENOL) suppository 650 mg (has no administration in time range)   oxyCODONE-acetaminophen (PERCOCET) 5-325 MG per tablet 1 tablet (has no administration in time range)   melatonin tablet 10 mg (has no administration in time range)   cloNIDine (CATAPRES) tablet 0.1 mg (has no administration in time range)   ondansetron ODT (ZOFRAN-ODT) disintegrating tablet 4 mg ( Oral Not Given:  See Alt 6/29/24 2241)     Or   ondansetron (ZOFRAN) injection 4 mg (4 mg Intravenous Given 6/29/24 2241)   thiamine (B-1) injection 200 mg (200 mg Intravenous Given 6/29/24 2104)     Followed by   thiamine (VITAMIN B-1) tablet 100 mg (has no administration in time range)   folic acid (FOLVITE) tablet 1 mg (has no administration in time range)   multivitamin with minerals 1 tablet (has no administration in time range)   LORazepam (ATIVAN) tablet 1 mg ( Oral Not Given:  See Alt 6/29/24 2241)     Or   midazolam (VERSED) injection 2 mg (2 mg Intravenous Given 6/29/24 2241)     Or   LORazepam (ATIVAN) tablet 2 mg ( Oral Not Given:  See Alt 6/29/24 2241)     Or   midazolam (VERSED) injection 4 mg ( Intravenous Not Given:  See Alt 6/29/24 2241)     Or   midazolam (VERSED) injection 4 mg ( Intravenous Not Given:  See Alt 6/29/24 2241)     Or   midazolam (VERSED) injection 4 mg ( Intramuscular Not Given:  See Alt 6/29/24 2241)   aspirin chewable tablet 81 mg (has no administration in time range)    hydrOXYzine (ATARAX) tablet 50 mg (has no administration in time range)   aspirin chewable tablet 324 mg (324 mg Oral Given 6/29/24 1352)   LORazepam (ATIVAN) injection 1 mg (1 mg Intravenous Given 6/29/24 1412)   morphine injection 2 mg (2 mg Intravenous Given 6/29/24 1710)   ondansetron (ZOFRAN) injection 4 mg (4 mg Intravenous Given 6/29/24 1435)   sodium chloride 0.9 % bolus 500 mL (0 mL Intravenous Stopped 6/29/24 1540)   insulin regular (humuLIN R,novoLIN R) injection 6 Units (6 Units Intravenous Given 6/29/24 1507)   iopamidol (ISOVUE-370) 76 % injection 100 mL (80 mL Intravenous Given 6/29/24 1538)   nitroglycerin (NITROSTAT) ointment 1 inch (1 inch Topical Given 6/29/24 1709)   HYDROmorphone (DILAUDID) injection 0.5 mg (0.5 mg Intravenous Given 6/29/24 2030)     ECG/EMG Results (last 24 hours)       Procedure Component Value Units Date/Time    ECG 12 Lead ED Triage Standing Order; Chest Pain [[id number]] Collected: 06/29/24 1345     Updated: 06/29/24 1345     QT Interval 306 ms      QTC Interval 410 ms     Narrative:      Test Reason : ED Triage Standing Order~  Blood Pressure :   */*   mmHG  Vent. Rate : 108 BPM     Atrial Rate : 108 BPM     P-R Int : 136 ms          QRS Dur :  76 ms      QT Int : 306 ms       P-R-T Axes :  26  10   6 degrees     QTc Int : 410 ms    Sinus tachycardia  Otherwise normal ECG  No previous ECGs available    Referred By: EDMD           Confirmed By:           ECG 12 Lead ED Triage Standing Order; Chest Pain   Preliminary Result   Test Reason : 1   Blood Pressure :   */*   mmHG   Vent. Rate :  97 BPM     Atrial Rate :  97 BPM      P-R Int : 148 ms          QRS Dur :  78 ms       QT Int : 326 ms       P-R-T Axes :  23   2   6 degrees      QTc Int : 414 ms      Normal sinus rhythm   Minimal voltage criteria for LVH, may be normal variant   Borderline ECG   When compared with ECG of 29-JUN-2024 13:45, (Unconfirmed)   No significant change was found      Referred By: EDMD            Confirmed By:       ECG 12 Lead ED Triage Standing Order; Chest Pain   Preliminary Result   Test Reason : ED Triage Standing Order~   Blood Pressure :   */*   mmHG   Vent. Rate : 108 BPM     Atrial Rate : 108 BPM      P-R Int : 136 ms          QRS Dur :  76 ms       QT Int : 306 ms       P-R-T Axes :  26  10   6 degrees      QTc Int : 410 ms      Sinus tachycardia   Otherwise normal ECG   No previous ECGs available      Referred By: EDMD           Confirmed By:                                                  Medical Decision Making  Problems Addressed:  Alcohol abuse: complicated acute illness or injury  Chest pain, unspecified type: complicated acute illness or injury  Elevated troponin: complicated acute illness or injury    Amount and/or Complexity of Data Reviewed  External Data Reviewed: notes.  Labs: ordered. Decision-making details documented in ED Course.  Radiology: ordered and independent interpretation performed. Decision-making details documented in ED Course.  ECG/medicine tests: ordered and independent interpretation performed. Decision-making details documented in ED Course.    Risk  OTC drugs.  Prescription drug management.  Decision regarding hospitalization.        Final diagnoses:   Chest pain, unspecified type   Elevated troponin   Alcohol abuse       ED Disposition  ED Disposition       ED Disposition   Decision to Admit    Condition   --    Comment   Level of Care: Telemetry [5]   Diagnosis: Chest pain [[id number]]   Admitting Physician: JUDITH WANG [[id number]   Bed Request Comments: not cdu, also etoh / seizure risk                    Tc Grimes DO  06/29/24 4244

## 2024-06-29 NOTE — Clinical Note
Level of Care: Telemetry [5]   Diagnosis: Chest pain [[id number]]   Admitting Physician: JUDITH WANG [[id number]]   Bed Request Comments: not cdu, also etoh / seizure risk
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
## 2024-06-30 ENCOUNTER — APPOINTMENT (OUTPATIENT)
Dept: CARDIOLOGY | Facility: HOSPITAL | Age: 44
End: 2024-06-30
Payer: COMMERCIAL

## 2024-06-30 LAB
ANION GAP SERPL CALCULATED.3IONS-SCNC: 10 MMOL/L (ref 5–15)
BASOPHILS # BLD AUTO: 0.05 10*3/MM3 (ref 0–0.2)
BASOPHILS NFR BLD AUTO: 0.8 % (ref 0–1.5)
BH CV ECHO MEAS - AO MAX PG: 4.2 MMHG
BH CV ECHO MEAS - AO MEAN PG: 2 MMHG
BH CV ECHO MEAS - AO ROOT DIAM: 2.6 CM
BH CV ECHO MEAS - AO V2 MAX: 103 CM/SEC
BH CV ECHO MEAS - AO V2 VTI: 18 CM
BH CV ECHO MEAS - AVA(I,D): 2.7 CM2
BH CV ECHO MEAS - EDV(CUBED): 148.9 ML
BH CV ECHO MEAS - EDV(MOD-SP2): 139 ML
BH CV ECHO MEAS - EDV(MOD-SP4): 141 ML
BH CV ECHO MEAS - EF(MOD-BP): 53.3 %
BH CV ECHO MEAS - EF(MOD-SP2): 54.2 %
BH CV ECHO MEAS - EF(MOD-SP4): 52.7 %
BH CV ECHO MEAS - ESV(CUBED): 46.7 ML
BH CV ECHO MEAS - ESV(MOD-SP2): 63.7 ML
BH CV ECHO MEAS - ESV(MOD-SP4): 66.7 ML
BH CV ECHO MEAS - FS: 32.1 %
BH CV ECHO MEAS - IVS/LVPW: 1 CM
BH CV ECHO MEAS - IVSD: 1 CM
BH CV ECHO MEAS - LA DIMENSION: 3.6 CM
BH CV ECHO MEAS - LAT PEAK E' VEL: 13.6 CM/SEC
BH CV ECHO MEAS - LV DIASTOLIC VOL/BSA (35-75): 68.4 CM2
BH CV ECHO MEAS - LV MASS(C)D: 200.4 GRAMS
BH CV ECHO MEAS - LV MAX PG: 2.41 MMHG
BH CV ECHO MEAS - LV MEAN PG: 1 MMHG
BH CV ECHO MEAS - LV SYSTOLIC VOL/BSA (12-30): 32.4 CM2
BH CV ECHO MEAS - LV V1 MAX: 77.6 CM/SEC
BH CV ECHO MEAS - LV V1 VTI: 13 CM
BH CV ECHO MEAS - LVIDD: 5.3 CM
BH CV ECHO MEAS - LVIDS: 3.6 CM
BH CV ECHO MEAS - LVOT AREA: 3.8 CM2
BH CV ECHO MEAS - LVOT DIAM: 2.2 CM
BH CV ECHO MEAS - LVPWD: 1 CM
BH CV ECHO MEAS - MED PEAK E' VEL: 8.7 CM/SEC
BH CV ECHO MEAS - MV A MAX VEL: 83.3 CM/SEC
BH CV ECHO MEAS - MV DEC SLOPE: 713 CM/SEC2
BH CV ECHO MEAS - MV DEC TIME: 0.23 SEC
BH CV ECHO MEAS - MV E MAX VEL: 98.5 CM/SEC
BH CV ECHO MEAS - MV E/A: 1.18
BH CV ECHO MEAS - MV MAX PG: 5.5 MMHG
BH CV ECHO MEAS - MV MEAN PG: 2 MMHG
BH CV ECHO MEAS - MV P1/2T: 48.1 MSEC
BH CV ECHO MEAS - MV V2 VTI: 19.4 CM
BH CV ECHO MEAS - MVA(P1/2T): 4.6 CM2
BH CV ECHO MEAS - MVA(VTI): 2.5 CM2
BH CV ECHO MEAS - PA ACC TIME: 0.1 SEC
BH CV ECHO MEAS - RAP SYSTOLE: 3 MMHG
BH CV ECHO MEAS - RVSP: 20.5 MMHG
BH CV ECHO MEAS - SV(LVOT): 49.4 ML
BH CV ECHO MEAS - SV(MOD-SP2): 75.3 ML
BH CV ECHO MEAS - SV(MOD-SP4): 74.3 ML
BH CV ECHO MEAS - SVI(LVOT): 24 ML/M2
BH CV ECHO MEAS - SVI(MOD-SP2): 36.5 ML/M2
BH CV ECHO MEAS - SVI(MOD-SP4): 36.1 ML/M2
BH CV ECHO MEAS - TAPSE (>1.6): 2.49 CM
BH CV ECHO MEAS - TR MAX PG: 17.5 MMHG
BH CV ECHO MEAS - TR MAX VEL: 209 CM/SEC
BH CV ECHO MEASUREMENTS AVERAGE E/E' RATIO: 8.83
BH CV XLRA - RV BASE: 4.3 CM
BH CV XLRA - RV LENGTH: 8.6 CM
BH CV XLRA - RV MID: 3.4 CM
BH CV XLRA - TDI S': 11.1 CM/SEC
BUN SERPL-MCNC: 14 MG/DL (ref 6–20)
BUN/CREAT SERPL: 16.7 (ref 7–25)
CALCIUM SPEC-SCNC: 9.1 MG/DL (ref 8.6–10.5)
CHLORIDE SERPL-SCNC: 100 MMOL/L (ref 98–107)
CHOLEST SERPL-MCNC: 102 MG/DL (ref 0–200)
CO2 SERPL-SCNC: 24 MMOL/L (ref 22–29)
CREAT SERPL-MCNC: 0.84 MG/DL (ref 0.76–1.27)
DEPRECATED RDW RBC AUTO: 47.6 FL (ref 37–54)
EGFRCR SERPLBLD CKD-EPI 2021: 108.9 ML/MIN/1.73
EOSINOPHIL # BLD AUTO: 0.15 10*3/MM3 (ref 0–0.4)
EOSINOPHIL NFR BLD AUTO: 2.3 % (ref 0.3–6.2)
ERYTHROCYTE [DISTWIDTH] IN BLOOD BY AUTOMATED COUNT: 14 % (ref 12.3–15.4)
GLUCOSE BLDC GLUCOMTR-MCNC: 143 MG/DL (ref 70–130)
GLUCOSE BLDC GLUCOMTR-MCNC: 170 MG/DL (ref 70–130)
GLUCOSE BLDC GLUCOMTR-MCNC: 178 MG/DL (ref 70–130)
GLUCOSE BLDC GLUCOMTR-MCNC: 247 MG/DL (ref 70–130)
GLUCOSE SERPL-MCNC: 205 MG/DL (ref 65–99)
HCT VFR BLD AUTO: 50.6 % (ref 37.5–51)
HDLC SERPL-MCNC: 40 MG/DL (ref 40–60)
HGB BLD-MCNC: 17 G/DL (ref 13–17.7)
IMM GRANULOCYTES # BLD AUTO: 0.03 10*3/MM3 (ref 0–0.05)
IMM GRANULOCYTES NFR BLD AUTO: 0.5 % (ref 0–0.5)
LDLC SERPL CALC-MCNC: 42 MG/DL (ref 0–100)
LDLC/HDLC SERPL: 1.03 {RATIO}
LEFT ATRIUM VOLUME INDEX: 18.8 ML/M2
LYMPHOCYTES # BLD AUTO: 1.21 10*3/MM3 (ref 0.7–3.1)
LYMPHOCYTES NFR BLD AUTO: 18.5 % (ref 19.6–45.3)
MAGNESIUM SERPL-MCNC: 1.8 MG/DL (ref 1.6–2.6)
MCH RBC QN AUTO: 31.2 PG (ref 26.6–33)
MCHC RBC AUTO-ENTMCNC: 33.6 G/DL (ref 31.5–35.7)
MCV RBC AUTO: 92.8 FL (ref 79–97)
MONOCYTES # BLD AUTO: 0.48 10*3/MM3 (ref 0.1–0.9)
MONOCYTES NFR BLD AUTO: 7.3 % (ref 5–12)
NEUTROPHILS NFR BLD AUTO: 4.63 10*3/MM3 (ref 1.7–7)
NEUTROPHILS NFR BLD AUTO: 70.6 % (ref 42.7–76)
NRBC BLD AUTO-RTO: 0 /100 WBC (ref 0–0.2)
PLATELET # BLD AUTO: 156 10*3/MM3 (ref 140–450)
PMV BLD AUTO: 11.8 FL (ref 6–12)
POTASSIUM SERPL-SCNC: 4.4 MMOL/L (ref 3.5–5.2)
QT INTERVAL: 306 MS
QT INTERVAL: 326 MS
QT INTERVAL: 366 MS
QTC INTERVAL: 410 MS
QTC INTERVAL: 414 MS
QTC INTERVAL: 437 MS
RBC # BLD AUTO: 5.45 10*6/MM3 (ref 4.14–5.8)
SODIUM SERPL-SCNC: 134 MMOL/L (ref 136–145)
TRIGL SERPL-MCNC: 105 MG/DL (ref 0–150)
VLDLC SERPL-MCNC: 20 MG/DL (ref 5–40)
WBC NRBC COR # BLD AUTO: 6.55 10*3/MM3 (ref 3.4–10.8)

## 2024-06-30 PROCEDURE — 99214 OFFICE O/P EST MOD 30 MIN: CPT | Performed by: PSYCHIATRY & NEUROLOGY

## 2024-06-30 PROCEDURE — 25010000002 KETOROLAC TROMETHAMINE PER 15 MG: Performed by: INTERNAL MEDICINE

## 2024-06-30 PROCEDURE — 93306 TTE W/DOPPLER COMPLETE: CPT | Performed by: INTERNAL MEDICINE

## 2024-06-30 PROCEDURE — 25010000002 HEPARIN (PORCINE) PER 1000 UNITS: Performed by: NURSE PRACTITIONER

## 2024-06-30 PROCEDURE — 63710000001 INSULIN LISPRO (HUMAN) PER 5 UNITS: Performed by: NURSE PRACTITIONER

## 2024-06-30 PROCEDURE — 93005 ELECTROCARDIOGRAM TRACING: CPT | Performed by: NURSE PRACTITIONER

## 2024-06-30 PROCEDURE — 25010000002 MIDAZOLAM PER 1 MG: Performed by: NURSE PRACTITIONER

## 2024-06-30 PROCEDURE — 93306 TTE W/DOPPLER COMPLETE: CPT

## 2024-06-30 PROCEDURE — 96375 TX/PRO/DX INJ NEW DRUG ADDON: CPT

## 2024-06-30 PROCEDURE — 80048 BASIC METABOLIC PNL TOTAL CA: CPT | Performed by: NURSE PRACTITIONER

## 2024-06-30 PROCEDURE — 63710000001 INSULIN GLARGINE PER 5 UNITS: Performed by: NURSE PRACTITIONER

## 2024-06-30 PROCEDURE — 25010000002 THIAMINE HCL 200 MG/2ML SOLUTION: Performed by: NURSE PRACTITIONER

## 2024-06-30 PROCEDURE — G0378 HOSPITAL OBSERVATION PER HR: HCPCS

## 2024-06-30 PROCEDURE — 99233 SBSQ HOSP IP/OBS HIGH 50: CPT | Performed by: INTERNAL MEDICINE

## 2024-06-30 PROCEDURE — 85025 COMPLETE CBC W/AUTO DIFF WBC: CPT | Performed by: NURSE PRACTITIONER

## 2024-06-30 PROCEDURE — 82948 REAGENT STRIP/BLOOD GLUCOSE: CPT

## 2024-06-30 PROCEDURE — 93010 ELECTROCARDIOGRAM REPORT: CPT | Performed by: INTERNAL MEDICINE

## 2024-06-30 PROCEDURE — 99204 OFFICE O/P NEW MOD 45 MIN: CPT | Performed by: PHYSICIAN ASSISTANT

## 2024-06-30 PROCEDURE — 83735 ASSAY OF MAGNESIUM: CPT | Performed by: NURSE PRACTITIONER

## 2024-06-30 PROCEDURE — 80061 LIPID PANEL: CPT | Performed by: NURSE PRACTITIONER

## 2024-06-30 RX ORDER — KETOROLAC TROMETHAMINE 30 MG/ML
30 INJECTION, SOLUTION INTRAMUSCULAR; INTRAVENOUS ONCE
Status: COMPLETED | OUTPATIENT
Start: 2024-06-30 | End: 2024-06-30

## 2024-06-30 RX ORDER — OXYCODONE HYDROCHLORIDE AND ACETAMINOPHEN 5; 325 MG/1; MG/1
2 TABLET ORAL EVERY 4 HOURS PRN
Status: DISCONTINUED | OUTPATIENT
Start: 2024-06-30 | End: 2024-07-02 | Stop reason: HOSPADM

## 2024-06-30 RX ORDER — ALPRAZOLAM 1 MG/1
1 TABLET ORAL 4 TIMES DAILY
Status: DISCONTINUED | OUTPATIENT
Start: 2024-06-30 | End: 2024-07-02

## 2024-06-30 RX ADMIN — MIDAZOLAM HYDROCHLORIDE 4 MG: 1 INJECTION, SOLUTION INTRAMUSCULAR; INTRAVENOUS at 01:34

## 2024-06-30 RX ADMIN — EXTENDED PHENYTOIN SODIUM 300 MG: 100 CAPSULE ORAL at 01:34

## 2024-06-30 RX ADMIN — ALPRAZOLAM 1 MG: 1 TABLET ORAL at 11:58

## 2024-06-30 RX ADMIN — HEPARIN SODIUM 5000 UNITS: 5000 INJECTION INTRAVENOUS; SUBCUTANEOUS at 22:07

## 2024-06-30 RX ADMIN — INSULIN LISPRO 4 UNITS: 100 INJECTION, SOLUTION INTRAVENOUS; SUBCUTANEOUS at 18:15

## 2024-06-30 RX ADMIN — THIAMINE HYDROCHLORIDE 200 MG: 100 INJECTION, SOLUTION INTRAMUSCULAR; INTRAVENOUS at 05:16

## 2024-06-30 RX ADMIN — ALPRAZOLAM 1 MG: 1 TABLET ORAL at 22:08

## 2024-06-30 RX ADMIN — OXYCODONE HYDROCHLORIDE AND ACETAMINOPHEN 1 TABLET: 5; 325 TABLET ORAL at 09:56

## 2024-06-30 RX ADMIN — Medication 10 MG: at 22:28

## 2024-06-30 RX ADMIN — MIDAZOLAM HYDROCHLORIDE 4 MG: 1 INJECTION, SOLUTION INTRAMUSCULAR; INTRAVENOUS at 05:15

## 2024-06-30 RX ADMIN — Medication 1 TABLET: at 09:56

## 2024-06-30 RX ADMIN — OXYCODONE HYDROCHLORIDE AND ACETAMINOPHEN 1 TABLET: 5; 325 TABLET ORAL at 00:37

## 2024-06-30 RX ADMIN — FOLIC ACID 1 MG: 1 TABLET ORAL at 08:59

## 2024-06-30 RX ADMIN — ALPRAZOLAM 1 MG: 1 TABLET ORAL at 18:15

## 2024-06-30 RX ADMIN — EXTENDED PHENYTOIN SODIUM 100 MG: 100 CAPSULE ORAL at 22:28

## 2024-06-30 RX ADMIN — INSULIN GLARGINE 10 UNITS: 100 INJECTION, SOLUTION SUBCUTANEOUS at 22:09

## 2024-06-30 RX ADMIN — Medication 10 ML: at 09:01

## 2024-06-30 RX ADMIN — LORAZEPAM 1 MG: 1 TABLET ORAL at 08:59

## 2024-06-30 RX ADMIN — THIAMINE HYDROCHLORIDE 200 MG: 100 INJECTION, SOLUTION INTRAMUSCULAR; INTRAVENOUS at 22:08

## 2024-06-30 RX ADMIN — OXYCODONE HYDROCHLORIDE AND ACETAMINOPHEN 1 TABLET: 5; 325 TABLET ORAL at 14:02

## 2024-06-30 RX ADMIN — HYDROXYZINE HYDROCHLORIDE 50 MG: 25 TABLET ORAL at 14:06

## 2024-06-30 RX ADMIN — ASPIRIN 81 MG: 81 TABLET, CHEWABLE ORAL at 08:59

## 2024-06-30 RX ADMIN — DIAZEPAM 5 MG: 5 TABLET ORAL at 05:16

## 2024-06-30 RX ADMIN — SENNOSIDES AND DOCUSATE SODIUM 2 TABLET: 50; 8.6 TABLET ORAL at 08:59

## 2024-06-30 RX ADMIN — INSULIN LISPRO 2 UNITS: 100 INJECTION, SOLUTION INTRAVENOUS; SUBCUTANEOUS at 08:59

## 2024-06-30 RX ADMIN — NEBIVOLOL 10 MG: 10 TABLET ORAL at 08:59

## 2024-06-30 RX ADMIN — INSULIN LISPRO 2 UNITS: 100 INJECTION, SOLUTION INTRAVENOUS; SUBCUTANEOUS at 11:58

## 2024-06-30 RX ADMIN — ATORVASTATIN CALCIUM 20 MG: 20 TABLET, FILM COATED ORAL at 22:08

## 2024-06-30 RX ADMIN — HEPARIN SODIUM 5000 UNITS: 5000 INJECTION INTRAVENOUS; SUBCUTANEOUS at 05:16

## 2024-06-30 RX ADMIN — OXYCODONE HYDROCHLORIDE AND ACETAMINOPHEN 1 TABLET: 5; 325 TABLET ORAL at 05:26

## 2024-06-30 RX ADMIN — OXYCODONE HYDROCHLORIDE AND ACETAMINOPHEN 2 TABLET: 5; 325 TABLET ORAL at 22:28

## 2024-06-30 RX ADMIN — THIAMINE HYDROCHLORIDE 200 MG: 100 INJECTION, SOLUTION INTRAMUSCULAR; INTRAVENOUS at 14:03

## 2024-06-30 RX ADMIN — OXYCODONE HYDROCHLORIDE AND ACETAMINOPHEN 2 TABLET: 5; 325 TABLET ORAL at 18:16

## 2024-06-30 RX ADMIN — KETOROLAC TROMETHAMINE 30 MG: 30 INJECTION, SOLUTION INTRAMUSCULAR; INTRAVENOUS at 15:05

## 2024-06-30 RX ADMIN — HEPARIN SODIUM 5000 UNITS: 5000 INJECTION INTRAVENOUS; SUBCUTANEOUS at 14:03
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
## 2024-06-30 NOTE — CONSULTS
[facility]  CARDIOLOGY CONSULTATION NOTE    Bryant Edmonds  : 1980  MRN:[id number]    Date of Admission:2024  Date of Consultation: 24    PCP: System, Provider Not In    IDENTIFICATION: A 44 y.o. male     Chief Complaint   Patient presents with    Chest Pain       PROBLEM LIST:   CAD  Remote LHC, Stent(outside hostpital)-[place], S/p MI, Stent , No records available.   Chest pain atypical for angina, No Tropnin Delta. Negative CTA for PE  Hypertension  Dyslipidemia  Diabetes mellitus type 2 insulin-dependent  Previous substance abuse including alcohol  Testosterone, antibiotic steroid use  Seizure disorder, previous therapy with Dilantin  Medical noncompliance      ALLERGIES: No Known Allergies    HOME MEDICINES:   Outpatient Medications  ALPRAZOLAM PO  nebivolol (BYSTOLIC) 10 MG tablet  phenytoin ER (DILANTIN) 100 MG capsule  aspirin 81 MG chewable tablet  CloNIDine (CATAPRES) 0.1 MG tablet  HydrOXYzine Pamoate (VISTARIL PO)  UNKNOWN TO PATIENT       HPI: This is a 44-year-old [place]  who is visiting here in [place] from [place] presents to [facility] on 2024 for recent chest pain.  He describes left-sided rating to the left bicep worse with deep inspiration and cough.  He states she has had some vomiting and diaphoresis due to alcohol withdrawal recently.  Is also noted some shortness of breath exertion.  He has not any palpitations dizziness near syncope CV events.  He has been somewhat noncompliant with his medications.  There is also history of steroid, antibiotic use.  He is insulin-dependent diabetic A1c is 7.9.  Mildly elevated AST ALT.  Negative troponins in the ER with no delta.  CT scan of the chest revealed no InSu for PE.  EKG upon arrival to ER did not reveal acute ST to abnormalities.  He does report previous history of a stent placed over 10 years ago and does not recall having recent stress test or  "echocardiograms.      ROS: All systems have been reviewed and are negative with the exception of those mentioned in the HPI and problem list above.    Surgical History:   Past Surgical History:   Procedure Laterality Date    CORONARY ANGIOPLASTY WITH STENT PLACEMENT      FRACTURE SURGERY         Social History:   Social History     Socioeconomic History    Marital status: Single   Tobacco Use    Smoking status: Never    Smokeless tobacco: Current     Types: Snuff   Vaping Use    Vaping status: Never Used   Substance and Sexual Activity    Alcohol use: Yes     Comment: 1 liter vodka; admites to intermittent binging    Drug use: Defer    Sexual activity: Defer       Family History: History reviewed. No pertinent family history.    Objective     /74 (BP Location: Right arm, Patient Position: Lying)   Pulse 87   Temp 98.5 °F (36.9 °C) (Oral)   Resp 20   Ht 177.8 cm (70\")   Wt 88.2 kg (194 lb 8 oz)   SpO2 91%   BMI 27.91 kg/m²     Intake/Output Summary (Last 24 hours) at 6/30/2024 0907  Last data filed at 6/29/2024 1540  Gross per 24 hour   Intake 500 ml   Output --   Net 500 ml       PHYSICAL EXAM:  Constitutional:  Well-nourished, cooperative, in no acute distress.   Head:  Normocephalic, without obvious abnormality, atraumatic.   Neck: No adenopathy, supple, trachea midline, no thyromegaly, no    carotid bruit, no JVD.   Respiratory:   Clear to auscultation bilaterally; respirations regular, even and unlabored. No wheezes, rales or rhonchi.    Cardiovascular:  Regular rhythm and normal rate, normal S1 and S2, no            murmur, no gallop, no rub, no click.   Pulses: Peripheral pulses are present and equal bilaterally.   GI:   Soft, non-distended. Bowel sounds heard throughout. No organomegaly or masses. Non-tender to palpation, no guarding.   Extremities: No edema, clubbing or cyanosis.   Skin: Skin is warm and dry. No bleeding, bruising or rash.   Neurological: Alert, oriented to time, person and " place. No focal deficits.     Labs/Diagnostic Data  Results from last 7 days   Lab Units 06/29/24  1357   SODIUM mmol/L 135*   POTASSIUM mmol/L 4.6   CHLORIDE mmol/L 98   CO2 mmol/L 20.0*   BUN mg/dL 11   CREATININE mg/dL 0.88   GLUCOSE mg/dL 422*   CALCIUM mg/dL 8.6     Results from last 7 days   Lab Units 06/29/24  1612 06/29/24  1357   HSTROP T ng/L 33* 38*     Results from last 7 days   Lab Units 06/29/24  1357   WBC 10*3/mm3 4.76   HEMOGLOBIN g/dL 16.8   HEMATOCRIT % 50.4   PLATELETS 10*3/mm3 175             Results from last 7 days   Lab Units 06/29/24  1612   TSH uIU/mL 2.170     Results from last 7 days   Lab Units 06/29/24  1357   HEMOGLOBIN A1C % 7.90*     Results from last 7 days   Lab Units 06/29/24  1357   PROBNP pg/mL <36.0           I personally reviewed the patient's EKG/Telemetry data    Radiology Data:    Current Medications:    aspirin, 81 mg, Oral, Daily  atorvastatin, 20 mg, Oral, Nightly  diazePAM, 5 mg, Oral, Q6H   Followed by  [START ON 7/1/2024] diazePAM, 2.5 mg, Oral, Q6H  folic acid, 1 mg, Oral, Daily  heparin (porcine), 5,000 Units, Subcutaneous, Q8H  insulin glargine, 10 Units, Subcutaneous, Nightly  insulin lispro, 2-9 Units, Subcutaneous, 4x Daily AC & at Bedtime  multivitamin with minerals, 1 tablet, Oral, Daily  nebivolol, 10 mg, Oral, Daily  phenytoin, 100 mg, Oral, Q6H  senna-docusate sodium, 2 tablet, Oral, BID  sodium chloride, 10 mL, Intravenous, Q12H  thiamine (B-1) IV, 200 mg, Intravenous, Q8H   Followed by  [START ON 7/5/2024] thiamine, 100 mg, Oral, Daily           Assessment and Plan:     1.  Coronary disease: Reported catheterization and stent placement over 10 years ago.  Recent atypical chest pain admission to [facility] ER 6-9/24.  Negative acute changes on EKG and negative cardiac markers no delta.  Negative CTA for PE.    2.  Hypertension: Currently on Bystolic therapy    3.  Dyslipidemia: Restart statin therapy    4.  Diabetes mellitus,  insulin-dependent    5.  History of noncompliance, substance abuse with recent alcohol withdrawal symptoms.        Agree with current plan per Dr. Garcia continue medical therapy, echocardiogram.  Will consider  cardiac stress test when available -may have to defer until Tuesday due to patients ETOH withdrawal symptoms.  Review records and Echocardiogram when available from outside hospital. Cautioned patient about steroid use, ETOH use with cardiac disease.       Thank you for allowing me to participate in [facility]. Feel free to contact me directly with any further questions or concerns.  Electronically signed by ADRIANE Hays, 06/30/24, 9:14 AM EDT.
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
## 2024-06-30 NOTE — PLAN OF CARE
Goal Outcome Evaluation:  Plan of Care Reviewed With: patient        Progress: no change       CIWA scores increasing throughout the night. PRN medication given as needed for score.

## 2024-07-01 LAB
GLUCOSE BLDC GLUCOMTR-MCNC: 128 MG/DL (ref 70–130)
GLUCOSE BLDC GLUCOMTR-MCNC: 157 MG/DL (ref 70–130)
GLUCOSE BLDC GLUCOMTR-MCNC: 179 MG/DL (ref 70–130)
GLUCOSE BLDC GLUCOMTR-MCNC: 234 MG/DL (ref 70–130)

## 2024-07-01 PROCEDURE — 99232 SBSQ HOSP IP/OBS MODERATE 35: CPT | Performed by: NURSE PRACTITIONER

## 2024-07-01 PROCEDURE — 25010000002 THIAMINE HCL 200 MG/2ML SOLUTION: Performed by: NURSE PRACTITIONER

## 2024-07-01 PROCEDURE — 63710000001 INSULIN LISPRO (HUMAN) PER 5 UNITS: Performed by: NURSE PRACTITIONER

## 2024-07-01 PROCEDURE — 63710000001 INSULIN GLARGINE PER 5 UNITS: Performed by: NURSE PRACTITIONER

## 2024-07-01 PROCEDURE — G0378 HOSPITAL OBSERVATION PER HR: HCPCS

## 2024-07-01 PROCEDURE — 99232 SBSQ HOSP IP/OBS MODERATE 35: CPT | Performed by: INTERNAL MEDICINE

## 2024-07-01 PROCEDURE — 25010000002 MIDAZOLAM PER 1 MG: Performed by: NURSE PRACTITIONER

## 2024-07-01 PROCEDURE — 25010000002 HEPARIN (PORCINE) PER 1000 UNITS: Performed by: NURSE PRACTITIONER

## 2024-07-01 PROCEDURE — 82948 REAGENT STRIP/BLOOD GLUCOSE: CPT

## 2024-07-01 RX ORDER — MORPHINE SULFATE 2 MG/ML
2 INJECTION, SOLUTION INTRAMUSCULAR; INTRAVENOUS ONCE
Status: DISCONTINUED | OUTPATIENT
Start: 2024-07-02 | End: 2024-07-02 | Stop reason: HOSPADM

## 2024-07-01 RX ADMIN — EXTENDED PHENYTOIN SODIUM 100 MG: 100 CAPSULE ORAL at 23:05

## 2024-07-01 RX ADMIN — Medication 10 ML: at 21:21

## 2024-07-01 RX ADMIN — INSULIN LISPRO 4 UNITS: 100 INJECTION, SOLUTION INTRAVENOUS; SUBCUTANEOUS at 21:20

## 2024-07-01 RX ADMIN — HEPARIN SODIUM 5000 UNITS: 5000 INJECTION INTRAVENOUS; SUBCUTANEOUS at 13:31

## 2024-07-01 RX ADMIN — OXYCODONE HYDROCHLORIDE AND ACETAMINOPHEN 2 TABLET: 5; 325 TABLET ORAL at 23:06

## 2024-07-01 RX ADMIN — INSULIN GLARGINE 10 UNITS: 100 INJECTION, SOLUTION SUBCUTANEOUS at 21:21

## 2024-07-01 RX ADMIN — FOLIC ACID 1 MG: 1 TABLET ORAL at 09:31

## 2024-07-01 RX ADMIN — EXTENDED PHENYTOIN SODIUM 100 MG: 100 CAPSULE ORAL at 12:50

## 2024-07-01 RX ADMIN — MIDAZOLAM HYDROCHLORIDE 2 MG: 1 INJECTION, SOLUTION INTRAMUSCULAR; INTRAVENOUS at 23:50

## 2024-07-01 RX ADMIN — INSULIN LISPRO 2 UNITS: 100 INJECTION, SOLUTION INTRAVENOUS; SUBCUTANEOUS at 09:31

## 2024-07-01 RX ADMIN — ALPRAZOLAM 1 MG: 1 TABLET ORAL at 21:21

## 2024-07-01 RX ADMIN — OXYCODONE HYDROCHLORIDE AND ACETAMINOPHEN 2 TABLET: 5; 325 TABLET ORAL at 09:30

## 2024-07-01 RX ADMIN — THIAMINE HYDROCHLORIDE 200 MG: 100 INJECTION, SOLUTION INTRAMUSCULAR; INTRAVENOUS at 13:31

## 2024-07-01 RX ADMIN — OXYCODONE HYDROCHLORIDE AND ACETAMINOPHEN 2 TABLET: 5; 325 TABLET ORAL at 05:28

## 2024-07-01 RX ADMIN — OXYCODONE HYDROCHLORIDE AND ACETAMINOPHEN 2 TABLET: 5; 325 TABLET ORAL at 17:59

## 2024-07-01 RX ADMIN — ATORVASTATIN CALCIUM 20 MG: 20 TABLET, FILM COATED ORAL at 21:21

## 2024-07-01 RX ADMIN — HEPARIN SODIUM 5000 UNITS: 5000 INJECTION INTRAVENOUS; SUBCUTANEOUS at 21:20

## 2024-07-01 RX ADMIN — Medication 1 TABLET: at 09:47

## 2024-07-01 RX ADMIN — EXTENDED PHENYTOIN SODIUM 100 MG: 100 CAPSULE ORAL at 05:28

## 2024-07-01 RX ADMIN — HEPARIN SODIUM 5000 UNITS: 5000 INJECTION INTRAVENOUS; SUBCUTANEOUS at 05:28

## 2024-07-01 RX ADMIN — ASPIRIN 81 MG: 81 TABLET, CHEWABLE ORAL at 09:30

## 2024-07-01 RX ADMIN — ALPRAZOLAM 1 MG: 1 TABLET ORAL at 09:30

## 2024-07-01 RX ADMIN — THIAMINE HYDROCHLORIDE 200 MG: 100 INJECTION, SOLUTION INTRAMUSCULAR; INTRAVENOUS at 21:20

## 2024-07-01 RX ADMIN — SENNOSIDES AND DOCUSATE SODIUM 2 TABLET: 50; 8.6 TABLET ORAL at 09:30

## 2024-07-01 RX ADMIN — ALPRAZOLAM 1 MG: 1 TABLET ORAL at 12:50

## 2024-07-01 RX ADMIN — INSULIN LISPRO 2 UNITS: 100 INJECTION, SOLUTION INTRAVENOUS; SUBCUTANEOUS at 12:50

## 2024-07-01 RX ADMIN — EXTENDED PHENYTOIN SODIUM 100 MG: 100 CAPSULE ORAL at 17:57

## 2024-07-01 RX ADMIN — NEBIVOLOL 10 MG: 10 TABLET ORAL at 09:31

## 2024-07-01 RX ADMIN — ALPRAZOLAM 1 MG: 1 TABLET ORAL at 17:57

## 2024-07-01 RX ADMIN — THIAMINE HYDROCHLORIDE 200 MG: 100 INJECTION, SOLUTION INTRAMUSCULAR; INTRAVENOUS at 05:28

## 2024-07-01 RX ADMIN — OXYCODONE HYDROCHLORIDE AND ACETAMINOPHEN 2 TABLET: 5; 325 TABLET ORAL at 13:31

## 2024-07-01 NOTE — CASE MANAGEMENT/SOCIAL WORK
"Continued Stay Note  [facility]     Patient Name: Bryant Edmonds  MRN: [id number]  Today's Date: 7/1/2024    Admit Date: 6/29/2024    Plan: Bedside ETOH cessation counseling performed   Discharge Plan       Row Name 07/01/24 1400       Plan    Plan Bedside ETOH cessation counseling performed    Plan Comments Consult received, thank you.    HPI:   44 y.o. male with PMHx of PTSD / TBI s/p \"getting blown up in [place]\", seizure disorder, anxiety w/ chronic benzo use (> 10 years),  insulin dependent diabetes, HTN, CAD s/p stent placement (2014), anabolic steroid / testosterone use and alcohol use who presents to the ED for evaluation of chest pain. Pt reports left sided chest pain that started a few days ago; he says he knows he is going through withdrawal from stopping his chronic alprazolam and dilantin ~ 1 week ago and alcohol withdrawal w/ last drink this morning.     Admits to binge drinking- up to a liter a day.      Admission labs:    UDS negative  LFT's- AST/ALT/Total bili= 48/52/0.3  Lipase= 24    CIWAs range today: 1-2  Highest CIWA thus far= 15    Assessment:  Met with this pleasant gentleman today.  Our team engaged in a long discussion regarding his ETOH use/abuse.  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  He tells me that he does not believe in PTSD- but he does admit that he \"saw some things over there, that no one should ever have to see.\"  He states that he still has the  mindset, and does things based on reactions and muscle memory.  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  Endorses a cardiac HX as well.  He states \"you know, I believe I can just make up mind about something and just get it done. However, I decided to stop alcohol, benzos, and Dilantin all at the same time- that wasn't a good idea.\"  He states that his wife has " "delivered an ultimatum and that she told him \"you need to go to [place] for a few months, sober up, and get yourself together, or I'm leaving you.\"  Psychosocial:  Currently resides in [place], [place].  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  He now works in the [employer].  He has children here in [place].    Tentative plan:  Pt intends to medically detox and then quit drinking altogether- he feels that he possesses the motivation to do it all on his own.    Our team will follow and  on ETOH cessation, as well as provide support.    Thank you very much for this consult.                         Discharge Codes    No documentation.                 Expected Discharge Date and Time       Expected Discharge Date Expected Discharge Time    Jul 4, 2024               Divina Barbosa RN MA,BSN-  Addiction Medicine     "

## 2024-07-01 NOTE — CASE MANAGEMENT/SOCIAL WORK
Discharge Planning Assessment  [facility]     Patient Name: Bryant Edmonds  MRN: [id number]  Today's Date: 7/1/2024    Admit Date: 6/29/2024    Plan: Home   Discharge Needs Assessment       Row Name 07/01/24 1744       Living Environment    People in Home significant other    Name(s) of People in Home Nicolette Rivas  Significant Other  [phone]    Primary Care Provided by self    Provides Primary Care For no one    Family Caregiver if Needed significant other    Family Caregiver Names Nicolette Rivas Significant Other [phone]    Quality of Family Relationships helpful;involved;supportive    Able to Return to Prior Arrangements yes       Transition Planning    Patient/Family Anticipates Transition to home    Patient/Family Anticipated Services at Transition     Transportation Anticipated family or friend will provide       Discharge Needs Assessment    Equipment Currently Used at Home none                   Discharge Plan       Row Name 07/01/24 1740       Plan    Plan Home    Patient/Family in Agreement with Plan yes    Plan Comments Spoke with Mr. Edmonds in his room, to initiate discharge planning. He lives with his significant other in [facility]. He is from [place]. He verified he has [employer] [place] [employer] insurance. He has prescription coverage and uses [facility] or [facility] to get his scripts filled. He has no local PCP. Prior to admission, he was independent with ADL's. He uses no medical equipment at home. He is not current with home health. His plan is to go home at discharge. Family will transport. CM will continue to follow.    Final Discharge Disposition Code 01 - home or self-care                  Continued Care and Services - Admitted Since 6/29/2024    No active coordination exists for this encounter.       Expected Discharge Date and Time       Expected Discharge Date Expected Discharge Time    Jul 4, 2024            Demographic Summary       Row Name 07/01/24 1747        General Information    Admission Type observation    Arrived From emergency department    Referral Source admission list    Reason for Consult discharge planning    Preferred Language English       Contact Information    Permission Granted to Share Info With     Contact Information Obtained for                    Functional Status       Row Name 07/01/24 1743       Functional Status    Usual Activity Tolerance good    Current Activity Tolerance good       Functional Status, IADL    Medications independent    Meal Preparation independent    Housekeeping independent    Laundry independent    Shopping independent                   Psychosocial    No documentation.                  Abuse/Neglect    No documentation.                  Legal    No documentation.                  Substance Abuse    No documentation.                  Patient Forms    No documentation.                     Tamera Lorenz RN

## 2024-07-01 NOTE — CONSULTS
" Discussed and taught patient about type 2 diabetes self-management, risk factors, and importance of blood glucose control to reduce complications. Target blood glucose readings and A1c goals per ADA were reviewed. Reviewed with patient current A1c 7.9 and discussed its significance. Signs, symptoms, and treatment of hyperglycemia and hypoglycemia were discussed. Lifestyle changes such as physical activity with MD approval and healthy eating were encouraged. Stressed the importance of strict blood sugar control after surgery to prevent complications such as infection and to promote healing of incision. Encouraged pt to monitor blood sugar at home 3+  times per day and to call PCP if blood sugar is trending high. Encouraged to keep record of blood glucose readings to take to follow up appointment with PCP. Provided patient with copy of [facility]'s \"What is Diabetes\" handout, \"Blood Glucose Goals\" handout, and \"What is A1c\" handout. Discussed insulin pumps general function and use as well as CGM to aid in monitoring. Advised further discussion with providers to obtain prescription for these devices and seeing an endocrinologist for care titration. 30 minutes in the care and education of this patient.  "

## 2024-07-01 NOTE — PROGRESS NOTES
[facility] Cardiology  Inpatient Progress Note      Chief Complaint/Reason for consult:    Chief Complaint   Patient presents with    Chest Pain            Subjective  Ongoing left sided chest tightness along the lateral aspect of the lower L rib margin       Vital Sign Min/Max for last 24 hours  Temp  Min: 97.7 °F (36.5 °C)  Max: 98 °F (36.7 °C)   BP  Min: 129/91  Max: 150/100   Pulse  Min: 68  Max: 89   Resp  Min: 16  Max: 20   SpO2  Min: 97 %  Max: 98 %   No data recorded      Intake/Output Summary (Last 24 hours) at 7/1/2024 0911  Last data filed at 7/1/2024 0000  Gross per 24 hour   Intake --   Output 300 ml   Net -300 ml           Gen: well developed, sitting up on side of bed, comfortable appearing  HEENT: MMM, sclerae anicteric, conjunctivae normal  CV: regular rate, regular rhythm, no murmurs or rubs, normal S1, S2. 2+ radial and DP pulses  Pulm: RA, normal work of breathing, no wheezes, rales, rhonchi  Ext: normal bulk for age, normal tone, no dependent edema  Neuro: alert, oriented, face symmetrical, moving all extremities well  Psych: normal mood, appropriate affect    Tele:  SR    Results Review (reviewed the patient's recent labs in the electronic medical record):     EKG:  SR, borderline LVH    Results for orders placed during the hospital encounter of 06/29/24    Adult Transthoracic Echo Complete With Contrast if Necessary Per Protocol    Interpretation Summary    Left ventricular ejection fraction appears to be 51 - 55%.    The right ventricular cavity is borderline dilated.    Estimated right ventricular systolic pressure from tricuspid regurgitation is normal (<35 mmHg).      Radiology:    CT Angiogram Chest    Result Date: 6/29/2024  Impression: Limited examination due to suboptimal opacification of the pulmonary arteries. No evidence of pulmonary embolus through the proximal segmental arteries. Electronically Signed: Yordan Hernández MD  6/29/2024 4:10 PM EDT  Workstation ID:  [id number]    XR Chest 1 View    Result Date: 6/29/2024  Impression: 1. No acute cardiopulmonary disease. Electronically Signed: Rajinder Lua MD  6/29/2024 2:27 PM EDT  Workstation ID: [id number]     Lab Results   Component Value Date    WBC 6.55 06/30/2024    HGB 17.0 06/30/2024    HCT 50.6 06/30/2024    MCV 92.8 06/30/2024     06/30/2024     Lab Results   Component Value Date    GLUCOSE 205 (H) 06/30/2024    CALCIUM 9.1 06/30/2024     (L) 06/30/2024    K 4.4 06/30/2024    CO2 24.0 06/30/2024     06/30/2024    BUN 14 06/30/2024    CREATININE 0.84 06/30/2024    EGFRIFNONA 148 01/27/2019    BCR 16.7 06/30/2024    ANIONGAP 10.0 06/30/2024     Lab Results   Component Value Date    TROPONINT 33 (H) 06/29/2024    TROPONINT 38 (H) 06/29/2024      Lab Results   Component Value Date    PROBNP <36.0 06/29/2024     Lab Results   Component Value Date    HGBA1C 7.90 (H) 06/29/2024      Lab Results   Component Value Date    CHOL 102 06/30/2024    TRIG 105 06/30/2024    HDL 40 06/30/2024    LDL 42 06/30/2024      Assessment     This is a 44-year-old with a history of anabolic steroid use, alcohol abuse, benzodiazepine abuse, and premature CAD who presented with chest pain and found to have mildly elevated high-sensitivity troponin.  He has also had symptoms of alcohol withdrawal and has been drinking heavily, at least 1 L of alcohol daily for 2 months.    Chest pain  Hx of CAD with previous PCI  Mildly elevated troponin   - Continue aspirin, beta-blocker, statin   - Plan for stress testing tomorrow        ANDRIA Blake MD  7/1/2024  09:11 EDT

## 2024-07-01 NOTE — PROGRESS NOTES
[facility] Medicine Services  PROGRESS NOTE    Patient Name: Bryant Edmonds  : 1980  MRN: [id number]    Date of Admission: 2024  Primary Care Physician: System, Provider Not In    Subjective   Subjective     CC:  Chest pain, withdrawal     HPI:  Patient is sitting on side of bed working. He had numerous calls during my visit. He states he plans on quitting drinking. Having some sweats at times states that is his only symptom       Objective   Objective     Vital Signs:   Temp:  [97.7 °F (36.5 °C)-97.8 °F (36.6 °C)] 97.7 °F (36.5 °C)  Heart Rate:  [68-87] 87  Resp:  [16-18] 16  BP: (129-150)/() 138/93     Physical Exam:  Constitutional: No acute distress, awake, alert  HENT: NCAT, mucous membranes moist  Respiratory: Clear to auscultation bilaterally, respiratory effort normal room air   Cardiovascular: RRR, no murmurs, rubs, or gallops  Gastrointestinal: Positive bowel sounds, soft, nontender, nondistended  Musculoskeletal: No bilateral ankle edema  Psychiatric: Appropriate affect, cooperative  Neurologic: Oriented x 3, strength symmetric in all extremities, Cranial Nerves grossly intact to confrontation, speech clear  Skin: No rashes      Results Reviewed:  LAB RESULTS:      Lab 24  0955 24  1357   WBC 6.55 4.76   HEMOGLOBIN 17.0 16.8   HEMATOCRIT 50.6 50.4   PLATELETS 156 175   NEUTROS ABS 4.63 2.85   IMMATURE GRANS (ABS) 0.03 0.05   LYMPHS ABS 1.21 1.38   MONOS ABS 0.48 0.34   EOS ABS 0.15 0.08   MCV 92.8 94.6   D DIMER QUANT  --  0.27         Lab 24  0955 24  1612 24  1357   SODIUM 134*  --  135*   POTASSIUM 4.4  --  4.6   CHLORIDE 100  --  98   CO2 24.0  --  20.0*   ANION GAP 10.0  --  17.0*   BUN 14  --  11   CREATININE 0.84  --  0.88   EGFR 108.9  --  107.4   GLUCOSE 205*  --  422*   CALCIUM 9.1  --  8.6   MAGNESIUM 1.8  --   --    HEMOGLOBIN A1C  --   --  7.90*   TSH  --  2.170  --          Lab 24  1357   TOTAL PROTEIN 6.9    ALBUMIN 4.0   GLOBULIN 2.9   ALT (SGPT) 52*   AST (SGOT) 48*   BILIRUBIN 0.3   ALK PHOS 69   LIPASE 24         Lab 06/29/24  1612 06/29/24  1357   PROBNP  --  <36.0   HSTROP T 33* 38*         Lab 06/30/24  0955   CHOLESTEROL 102   LDL CHOL 42   HDL CHOL 40   TRIGLYCERIDES 105             Brief Urine Lab Results       None            Microbiology Results Abnormal       None            Adult Transthoracic Echo Complete With Contrast if Necessary Per Protocol    Result Date: 6/30/2024    Left ventricular ejection fraction appears to be 51 - 55%.   The right ventricular cavity is borderline dilated.   Estimated right ventricular systolic pressure from tricuspid regurgitation is normal (<35 mmHg).     CT Angiogram Chest    Result Date: 6/29/2024  CT ANGIOGRAM CHEST Date of Exam: 6/29/2024 3:28 PM EDT Indication: pleuritic chest pain, tachycardia. Comparison: None available. Technique: CTA of the chest was performed after the uneventful intravenous administration of 80 mL Isovue-370. Reconstructed coronal and sagittal images were also obtained. In addition, a 3-D volume rendered image was created for interpretation. Automated exposure control and iterative reconstruction methods were used. Findings: Diagnostic quality: Contrast opacification of the pulmonary arterial circulation is suboptimal for assessment of pulmonary embolism. Study is not significantly limited by respiratory motion artifact. Pulmonary arteries: No evidence of pulmonary embolus. Normal main pulmonary artery diameter. Heart and pericardium:No flattening of the interventricular septum. No coronary artery calcification. No substantial pericardial effusion. Vessels:Normal caliber aorta. No atherosclerotic calcification. No evidence of acute aortic injury. Venous structures including the superior vena cava and inferior vena cava appear grossly patent. Mediastinum:Unremarkable appearance of the esophagus. No enlarged or suspicious mediastinal or hilar  lymphadenopathy. No anterior mediastinal masses. Lower neck:No suspicious or enlarged supraclavicular or axillary lymphadenopathy. Normal appearance of the thyroid. Pulmonary parenchyma:No evidence of pulmonary infarct. No suspicious pulmonary nodules. Pleura:No evidence of pleural effusion or pneumothorax. Airways:Central and segmental airways are clear. Chest wall and bones:No acute osseous abnormality. No substantial degenerative changes of thoracic spine. Unremarkable appearance of soft tissues. Upper abdomen:No lesion seen within the visualized liver. Upper abdomen is unremarkable.     Impression: Impression: Limited examination due to suboptimal opacification of the pulmonary arteries. No evidence of pulmonary embolus through the proximal segmental arteries. Electronically Signed: Yodran Hernández MD  6/29/2024 4:10 PM EDT  Workstation ID: [id number]     Results for orders placed during the hospital encounter of 06/29/24    Adult Transthoracic Echo Complete With Contrast if Necessary Per Protocol    Interpretation Summary    Left ventricular ejection fraction appears to be 51 - 55%.    The right ventricular cavity is borderline dilated.    Estimated right ventricular systolic pressure from tricuspid regurgitation is normal (<35 mmHg).      Current medications:  Scheduled Meds:ALPRAZolam, 1 mg, Oral, 4x Daily  aspirin, 81 mg, Oral, Daily  atorvastatin, 20 mg, Oral, Nightly  folic acid, 1 mg, Oral, Daily  heparin (porcine), 5,000 Units, Subcutaneous, Q8H  insulin glargine, 10 Units, Subcutaneous, Nightly  insulin lispro, 2-9 Units, Subcutaneous, 4x Daily AC & at Bedtime  multivitamin with minerals, 1 tablet, Oral, Daily  nebivolol, 10 mg, Oral, Daily  phenytoin, 100 mg, Oral, Q6H  senna-docusate sodium, 2 tablet, Oral, BID  sodium chloride, 10 mL, Intravenous, Q12H  thiamine (B-1) IV, 200 mg, Intravenous, Q8H   Followed by  [START ON 7/5/2024] thiamine, 100 mg, Oral, Daily      Continuous Infusions:   PRN Meds:.   "acetaminophen **OR** acetaminophen **OR** acetaminophen    senna-docusate sodium **AND** polyethylene glycol **AND** bisacodyl **AND** bisacodyl    cloNIDine    dextrose    dextrose    glucagon (human recombinant)    hydrOXYzine    LORazepam **OR** midazolam **OR** LORazepam **OR** midazolam **OR** midazolam **OR** midazolam    melatonin    nitroglycerin    ondansetron ODT **OR** ondansetron    oxyCODONE-acetaminophen    sodium chloride    sodium chloride    sodium chloride    Assessment & Plan   Assessment & Plan     Active Hospital Problems    Diagnosis  POA    **Chest pain [R07.9]  Yes    ROSALINO (generalized anxiety disorder) [F41.1]  Unknown    PTSD (post-traumatic stress disorder) [F43.10]  Unknown    TBI (traumatic brain injury) [S06.9XAA]  Unknown    Primary hypertension [I10]  Unknown    Coronary artery disease involving native coronary artery with unstable angina pectoris [I25.110]  Unknown    Type 2 diabetes mellitus with hyperglycemia, with long-term current use of insulin [E11.65, Z79.4]  Not Applicable    Seizure disorder [G40.909]  Unknown    Alcohol use [Z78.9]  Unknown    History of noncompliance with medical treatment [Z91.199]  Not Applicable      Resolved Hospital Problems   No resolved problems to display.        Brief Hospital Course to date:  Bryant Edmonds is a 44 y.o. male  with PMHx of PTSD / TBI s/p \"getting blown up in [place]\", seizure disorder, anxiety w/ chronic benzo use (> 10 years),  insulin dependent diabetes, HTN, CAD s/p stent placement (2014), anabolic steroid / testosterone use and alcohol use who presents to the ED for evaluation of chest pain.     Plan was partially entered by my partner and I have reviewed and updated as appropriate on 7/1/24     Chest pain  Hx CAD w/ stents  - pain worse w/ deep inspiration  - Troponin elevated at 38 on presentation, trended down.   - EKG without ST or T wave changes concerning for ischemia, personally reviewed  - ECHO EF 51-55%  - Continue ASA, " Statin  - cardiology consulted. plan for treadmill stress test on 7/2     Alcohol Dependence, Benzodiazepine dependence with Withdrawal  - Alcohol Level elevated at 194 on admission   - History of seizures  - Takes 1mg of xanax QID. Will resume home xanax dosing as this complicates his alcohol withdrawal. Need to work on getting sober for alcohol first and then address the benzos. Will likely require a long taper as an outpatient. Patient counseled that stopping benzos cold turkey can cause life threatening withdrawals.   - Continue CIWA protocol with PRN ativan  - addiction consult     PTSD  Anxiety disorder  - reporting 1 mg alprazolam QID  - will need outpatient psych follow-up for anxiety/PTSD management  - Continue Atarax      Seizure disorder  Hx TBI precautions  - reports last seizure was in March, hospitalized in [place]  - request records for review  - stopped dilantin ~ 1 week ago  - dilantin level < 0.8.   - received loading dose of dilantin on admission. Continue dilantin  - Neurology consult   -- recheck Phenytoin level in am      Elevated LFT's  - likely r/t alcohol use  - Acute hep panel negative     HTN  - continue home bystolic  - clonidine PRN     T2DM - insulin dependent  - continue SSI and Levemir     Testosterone / anabolic steroid use  - d dimer neg; CTA w/o evidence of PE       Expected Discharge Location and Transportation: home   Expected Discharge   Expected Discharge Date: 7/4/2024; Expected Discharge Time:      VTE Prophylaxis:  Pharmacologic VTE prophylaxis orders are present.         AM-PAC 6 Clicks Score (PT): 24 (07/01/24 0800)    CODE STATUS:   Code Status and Medical Interventions:   Ordered at: 06/29/24 2046     Code Status (Patient has no pulse and is not breathing):    CPR (Attempt to Resuscitate)     Medical Interventions (Patient has pulse or is breathing):    Full Support       Sharee Peoples, APRN  07/01/24

## 2024-07-02 ENCOUNTER — APPOINTMENT (OUTPATIENT)
Dept: CARDIOLOGY | Facility: HOSPITAL | Age: 44
End: 2024-07-02
Payer: COMMERCIAL

## 2024-07-02 VITALS
DIASTOLIC BLOOD PRESSURE: 71 MMHG | SYSTOLIC BLOOD PRESSURE: 137 MMHG | HEIGHT: 70 IN | BODY MASS INDEX: 27.84 KG/M2 | HEART RATE: 80 BPM | OXYGEN SATURATION: 98 % | RESPIRATION RATE: 18 BRPM | TEMPERATURE: 97.8 F | WEIGHT: 194.45 LBS

## 2024-07-02 LAB
BH CV REST NUCLEAR ISOTOPE DOSE: 30.1 MCI
BH CV STRESS BP STAGE 1: NORMAL
BH CV STRESS BP STAGE 3: NORMAL
BH CV STRESS COMMENTS STAGE 1: NORMAL
BH CV STRESS DOSE REGADENOSON STAGE 1: 0.4
BH CV STRESS DURATION MIN STAGE 1: 1
BH CV STRESS DURATION MIN STAGE 2: 1
BH CV STRESS DURATION MIN STAGE 3: 1
BH CV STRESS DURATION MIN STAGE 4: 1
BH CV STRESS DURATION SEC STAGE 1: 0
BH CV STRESS DURATION SEC STAGE 2: 0
BH CV STRESS DURATION SEC STAGE 3: 0
BH CV STRESS DURATION SEC STAGE 4: 0
BH CV STRESS HR STAGE 1: 99
BH CV STRESS HR STAGE 2: 103
BH CV STRESS HR STAGE 3: 93
BH CV STRESS HR STAGE 4: 89
BH CV STRESS NUCLEAR ISOTOPE DOSE: 30.1 MCI
BH CV STRESS O2 STAGE 1: 100
BH CV STRESS O2 STAGE 2: 100
BH CV STRESS O2 STAGE 3: 100
BH CV STRESS O2 STAGE 4: 100
BH CV STRESS PROTOCOL 1: NORMAL
BH CV STRESS RECOVERY BP: NORMAL MMHG
BH CV STRESS RECOVERY HR: 82 BPM
BH CV STRESS RECOVERY O2: 99 %
BH CV STRESS STAGE 1: 1
BH CV STRESS STAGE 2: 2
BH CV STRESS STAGE 3: 3
BH CV STRESS STAGE 4: 4
GLUCOSE BLDC GLUCOMTR-MCNC: 129 MG/DL (ref 70–130)
GLUCOSE BLDC GLUCOMTR-MCNC: 178 MG/DL (ref 70–130)
GLUCOSE BLDC GLUCOMTR-MCNC: 240 MG/DL (ref 70–130)
MAXIMAL PREDICTED HEART RATE: 174 BPM
PERCENT MAX PREDICTED HR: 59.2 %
PHENYTOIN SERPL-MCNC: 9.5 MCG/ML (ref 10–20)
STRESS BASELINE BP: NORMAL MMHG
STRESS BASELINE HR: 71 BPM
STRESS O2 SAT REST: 99 %
STRESS PERCENT HR: 70 %
STRESS POST ESTIMATED WORKLOAD: 1 METS
STRESS POST EXERCISE DUR MIN: 4 MIN
STRESS POST EXERCISE DUR SEC: 0 SEC
STRESS POST O2 SAT PEAK: 100 %
STRESS POST PEAK BP: NORMAL MMHG
STRESS POST PEAK HR: 103 BPM
STRESS TARGET HR: 148 BPM

## 2024-07-02 PROCEDURE — 82948 REAGENT STRIP/BLOOD GLUCOSE: CPT

## 2024-07-02 PROCEDURE — 99239 HOSP IP/OBS DSCHRG MGMT >30: CPT | Performed by: NURSE PRACTITIONER

## 2024-07-02 PROCEDURE — 25010000002 THIAMINE HCL 200 MG/2ML SOLUTION: Performed by: NURSE PRACTITIONER

## 2024-07-02 PROCEDURE — 0 RUBIDIUM CHLORIDE: Performed by: INTERNAL MEDICINE

## 2024-07-02 PROCEDURE — 25010000002 HEPARIN (PORCINE) PER 1000 UNITS: Performed by: NURSE PRACTITIONER

## 2024-07-02 PROCEDURE — 80185 ASSAY OF PHENYTOIN TOTAL: CPT | Performed by: NURSE PRACTITIONER

## 2024-07-02 PROCEDURE — G0378 HOSPITAL OBSERVATION PER HR: HCPCS

## 2024-07-02 PROCEDURE — 99232 SBSQ HOSP IP/OBS MODERATE 35: CPT | Performed by: NURSE PRACTITIONER

## 2024-07-02 PROCEDURE — 96375 TX/PRO/DX INJ NEW DRUG ADDON: CPT

## 2024-07-02 PROCEDURE — 93017 CV STRESS TEST TRACING ONLY: CPT

## 2024-07-02 PROCEDURE — 78431 MYOCRD IMG PET RST&STRS CT: CPT | Performed by: INTERNAL MEDICINE

## 2024-07-02 PROCEDURE — 25010000002 REGADENOSON 0.4 MG/5ML SOLUTION: Performed by: INTERNAL MEDICINE

## 2024-07-02 PROCEDURE — 63710000001 INSULIN LISPRO (HUMAN) PER 5 UNITS: Performed by: NURSE PRACTITIONER

## 2024-07-02 PROCEDURE — 93018 CV STRESS TEST I&R ONLY: CPT | Performed by: INTERNAL MEDICINE

## 2024-07-02 PROCEDURE — 78431 MYOCRD IMG PET RST&STRS CT: CPT

## 2024-07-02 PROCEDURE — A9555 RB82 RUBIDIUM: HCPCS | Performed by: INTERNAL MEDICINE

## 2024-07-02 RX ORDER — PHENYTOIN SODIUM 100 MG/1
100 CAPSULE, EXTENDED RELEASE ORAL EVERY 6 HOURS SCHEDULED
Qty: 120 CAPSULE | Refills: 0 | Status: SHIPPED | OUTPATIENT
Start: 2024-07-02 | End: 2024-07-02

## 2024-07-02 RX ORDER — DIAZEPAM 10 MG/1
TABLET ORAL
Qty: 11 TABLET | Refills: 0 | Status: SHIPPED | OUTPATIENT
Start: 2024-07-02 | End: 2024-07-07

## 2024-07-02 RX ORDER — CLONIDINE HYDROCHLORIDE 0.1 MG/1
0.1 TABLET ORAL 2 TIMES DAILY PRN
Qty: 60 TABLET | Refills: 0 | Status: SHIPPED | OUTPATIENT
Start: 2024-07-02

## 2024-07-02 RX ORDER — ATORVASTATIN CALCIUM 20 MG/1
20 TABLET, FILM COATED ORAL NIGHTLY
Qty: 90 TABLET | Refills: 0 | Status: SHIPPED | OUTPATIENT
Start: 2024-07-02

## 2024-07-02 RX ORDER — REGADENOSON 0.08 MG/ML
0.4 INJECTION, SOLUTION INTRAVENOUS ONCE
Status: COMPLETED | OUTPATIENT
Start: 2024-07-02 | End: 2024-07-02

## 2024-07-02 RX ORDER — MULTIPLE VITAMINS W/ MINERALS TAB 9MG-400MCG
1 TAB ORAL DAILY
Start: 2024-07-03

## 2024-07-02 RX ORDER — PHENYTOIN SODIUM 100 MG/1
100 CAPSULE, EXTENDED RELEASE ORAL EVERY 6 HOURS SCHEDULED
Qty: 120 CAPSULE | Refills: 0 | Status: SHIPPED | OUTPATIENT
Start: 2024-07-02 | End: 2024-08-01

## 2024-07-02 RX ORDER — FOLIC ACID 1 MG/1
1 TABLET ORAL DAILY
Start: 2024-07-03

## 2024-07-02 RX ORDER — LOSARTAN POTASSIUM 50 MG/1
50 TABLET ORAL
Qty: 90 TABLET | Refills: 0 | Status: SHIPPED | OUTPATIENT
Start: 2024-07-02

## 2024-07-02 RX ORDER — LANOLIN ALCOHOL/MO/W.PET/CERES
100 CREAM (GRAM) TOPICAL DAILY
Start: 2024-07-05

## 2024-07-02 RX ORDER — CAFFEINE CITRATE 20 MG/ML
60 SOLUTION INTRAVENOUS ONCE
Status: COMPLETED | OUTPATIENT
Start: 2024-07-02 | End: 2024-07-02

## 2024-07-02 RX ORDER — ALPRAZOLAM 1 MG/1
1 TABLET ORAL EVERY 8 HOURS SCHEDULED
Qty: 15 TABLET | Refills: 0 | Status: DISCONTINUED | OUTPATIENT
Start: 2024-07-02 | End: 2024-07-02 | Stop reason: HOSPADM

## 2024-07-02 RX ORDER — LOSARTAN POTASSIUM 50 MG/1
50 TABLET ORAL
Status: DISCONTINUED | OUTPATIENT
Start: 2024-07-02 | End: 2024-07-02 | Stop reason: HOSPADM

## 2024-07-02 RX ADMIN — NEBIVOLOL 10 MG: 10 TABLET ORAL at 09:15

## 2024-07-02 RX ADMIN — Medication 1 TABLET: at 09:15

## 2024-07-02 RX ADMIN — OXYCODONE HYDROCHLORIDE AND ACETAMINOPHEN 2 TABLET: 5; 325 TABLET ORAL at 13:55

## 2024-07-02 RX ADMIN — EXTENDED PHENYTOIN SODIUM 100 MG: 100 CAPSULE ORAL at 05:31

## 2024-07-02 RX ADMIN — FOLIC ACID 1 MG: 1 TABLET ORAL at 09:15

## 2024-07-02 RX ADMIN — INSULIN LISPRO 4 UNITS: 100 INJECTION, SOLUTION INTRAVENOUS; SUBCUTANEOUS at 11:59

## 2024-07-02 RX ADMIN — EXTENDED PHENYTOIN SODIUM 100 MG: 100 CAPSULE ORAL at 17:33

## 2024-07-02 RX ADMIN — THIAMINE HYDROCHLORIDE 200 MG: 100 INJECTION, SOLUTION INTRAMUSCULAR; INTRAVENOUS at 05:31

## 2024-07-02 RX ADMIN — RUBIDIUM CHLORIDE RB-82 1 DOSE: 150 INJECTION, SOLUTION INTRAVENOUS at 08:36

## 2024-07-02 RX ADMIN — ALPRAZOLAM 1 MG: 1 TABLET ORAL at 09:15

## 2024-07-02 RX ADMIN — LOSARTAN POTASSIUM 50 MG: 50 TABLET, FILM COATED ORAL at 13:17

## 2024-07-02 RX ADMIN — REGADENOSON 0.4 MG: 0.08 INJECTION, SOLUTION INTRAVENOUS at 08:47

## 2024-07-02 RX ADMIN — THIAMINE HYDROCHLORIDE 200 MG: 100 INJECTION, SOLUTION INTRAMUSCULAR; INTRAVENOUS at 13:17

## 2024-07-02 RX ADMIN — SENNOSIDES AND DOCUSATE SODIUM 2 TABLET: 50; 8.6 TABLET ORAL at 09:15

## 2024-07-02 RX ADMIN — ALPRAZOLAM 1 MG: 1 TABLET ORAL at 11:59

## 2024-07-02 RX ADMIN — OXYCODONE HYDROCHLORIDE AND ACETAMINOPHEN 2 TABLET: 5; 325 TABLET ORAL at 04:26

## 2024-07-02 RX ADMIN — OXYCODONE HYDROCHLORIDE AND ACETAMINOPHEN 2 TABLET: 5; 325 TABLET ORAL at 09:15

## 2024-07-02 RX ADMIN — RUBIDIUM CHLORIDE RB-82 1 DOSE: 150 INJECTION, SOLUTION INTRAVENOUS at 08:47

## 2024-07-02 RX ADMIN — EXTENDED PHENYTOIN SODIUM 100 MG: 100 CAPSULE ORAL at 11:59

## 2024-07-02 RX ADMIN — CAFFEINE CITRATE 60 MG: 20 INJECTION, SOLUTION INTRAVENOUS at 08:54

## 2024-07-02 RX ADMIN — ASPIRIN 81 MG: 81 TABLET, CHEWABLE ORAL at 09:15

## 2024-07-02 RX ADMIN — HEPARIN SODIUM 5000 UNITS: 5000 INJECTION INTRAVENOUS; SUBCUTANEOUS at 13:17
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
## 2024-07-02 NOTE — CASE MANAGEMENT/SOCIAL WORK
Case Management Discharge Note      Final Note: Mr. Edmonds is being discharged home today, 7/2/24. CM arranged a PCP for follow-up with ADRIANE Yang on July 8, 2024 at 11:00 AM. No further discharge needs noted.         Selected Continued Care - Admitted Since 6/29/2024       Destination    No services have been selected for the patient.                Durable Medical Equipment    No services have been selected for the patient.                Dialysis/Infusion    No services have been selected for the patient.                Home Medical Care    No services have been selected for the patient.                Therapy    No services have been selected for the patient.                Community Resources    No services have been selected for the patient.                Community & DME    No services have been selected for the patient.                         Final Discharge Disposition Code: 01 - home or self-care

## 2024-07-02 NOTE — PROGRESS NOTES
"  [facility]  PROGRESS NOTE    Date of Admission: 6/29/2024  Date of Service: 07/02/24    Primary Care Physician: System, Provider Not In    Chief Complaint: Chest pain  Problem List:   Chest pain    ROSALINO (generalized anxiety disorder)    PTSD (post-traumatic stress disorder)    TBI (traumatic brain injury)    Primary hypertension    Coronary artery disease involving native coronary artery with unstable angina pectoris    Type 2 diabetes mellitus with hyperglycemia, with long-term current use of insulin    Seizure disorder    Alcohol use    History of noncompliance with medical treatment      Subjective      HPI: Patient underwent stress testing earlier today.  Results did not show any ischemia.  No real coronary calcification was noted on CT imaging.  He has not had any chest pain since admission.  He is sitting on the side of the bed without complaints.  He is working on his laptop.  He tells me at home his blood pressures always run in the 160s to 170s systolic      Objective   Vitals: /87 (BP Location: Right arm, Patient Position: Lying)   Pulse 71   Temp 98 °F (36.7 °C) (Oral)   Resp 18   Ht 177.8 cm (70\")   Wt 88.2 kg (194 lb 8 oz)   SpO2 98%   BMI 27.91 kg/m²     Physical Exam:  GENERAL: Alert, cooperative, in no acute distress.   HEENT: Normocephalic, no jugular venous distention  HEART: Regular rhythm, normal rate, and no murmurs, gallops, or rubs.   LUNGS: Clear to auscultation bilaterally. No wheezing, rales or rhonchi.  ABDOMEN: Soft, bowel sounds present, nontender   NEUROLOGIC: No focal abnormalities involving strength or sensation are noted.   EXTREMITIES: No clubbing, cyanosis, or edema noted.     Results:  Results from last 7 days   Lab Units 06/30/24  0955 06/29/24  1357   WBC 10*3/mm3 6.55 4.76   HEMOGLOBIN g/dL 17.0 16.8   HEMATOCRIT % 50.6 50.4   PLATELETS 10*3/mm3 156 175     Results from last 7 days   Lab Units 06/30/24  0955 06/29/24  1357 "   SODIUM mmol/L 134* 135*   POTASSIUM mmol/L 4.4 4.6   CHLORIDE mmol/L 100 98   CO2 mmol/L 24.0 20.0*   BUN mg/dL 14 11   CREATININE mg/dL 0.84 0.88   GLUCOSE mg/dL 205* 422*      Lab Results   Component Value Date    CHOL 102 06/30/2024    TRIG 105 06/30/2024    HDL 40 06/30/2024    LDL 42 06/30/2024    AST 48 (H) 06/29/2024    ALT 52 (H) 06/29/2024     Results from last 7 days   Lab Units 06/29/24  1357   HEMOGLOBIN A1C % 7.90*     Results from last 7 days   Lab Units 06/30/24  0955   CHOLESTEROL mg/dL 102   TRIGLYCERIDES mg/dL 105   HDL CHOL mg/dL 40   LDL CHOL mg/dL 42     Results from last 7 days   Lab Units 06/29/24  1612   TSH uIU/mL 2.170             Results from last 7 days   Lab Units 06/29/24  1612 06/29/24  1357   HSTROP T ng/L 33* 38*     Results from last 7 days   Lab Units 06/29/24  1357   PROBNP pg/mL <36.0         Intake/Output Summary (Last 24 hours) at 7/2/2024 0815  Last data filed at 7/2/2024 0400  Gross per 24 hour   Intake 327 ml   Output --   Net 327 ml       I personally reviewed the patient's EKG/Telemetry data    Radiology Data: CT angiogram of the chest (6/29/2024):.  Limited exam due to suboptimal opacification of pulmonary arteries.  No evidence of pulmonary embolus          Current Medications:  ALPRAZolam, 1 mg, Oral, 4x Daily  aspirin, 81 mg, Oral, Daily  atorvastatin, 20 mg, Oral, Nightly  folic acid, 1 mg, Oral, Daily  heparin (porcine), 5,000 Units, Subcutaneous, Q8H  insulin glargine, 10 Units, Subcutaneous, Nightly  insulin lispro, 2-9 Units, Subcutaneous, 4x Daily AC & at Bedtime  Morphine, 2 mg, Intravenous, Once  multivitamin with minerals, 1 tablet, Oral, Daily  nebivolol, 10 mg, Oral, Daily  phenytoin, 100 mg, Oral, Q6H  senna-docusate sodium, 2 tablet, Oral, BID  sodium chloride, 10 mL, Intravenous, Q12H  thiamine (B-1) IV, 200 mg, Intravenous, Q8H   Followed by  [START ON 7/5/2024] thiamine, 100 mg, Oral, Daily           Assessment:   Coronary artery disease   History of  CAD with previous PCI  High-sensitivity troponin 38 and EKG sinus rhythm/sinus tachycardia with no ischemic changes  Echo this admission LVEF 51-55%.  No significant valvular abnormality.  Aspirin 81 mg daily  Type 2 diabetes mellitus  Hemoglobin A1c 7.9  Hyperlipidemia  Total cholesterol 102, triglycerides 105, HDL 40, LDL 42  Lipitor 20 mg daily  Hypertension  Nebivolol 10 mg daily  Alcohol/drug abuse  Case management/ assisting with resources for chemical dependency  Anabolic steroid use      Plan:   Stress test normal suggesting no ischemia  Recommend addition of losartan 50 mg daily for better blood pressure  BMP in 1 week  Control continue aspirin, beta-blocker and statin  Okay for discharge from our standpoint  Patient should follow-up with primary cardiologist located in [place] in 4 to 6 weeks.    PAULO Smith APRN
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
## 2024-07-03 ENCOUNTER — READMISSION MANAGEMENT (OUTPATIENT)
Dept: CALL CENTER | Facility: HOSPITAL | Age: 44
End: 2024-07-03
Payer: COMMERCIAL

## 2024-07-03 ENCOUNTER — TRANSITIONAL CARE MANAGEMENT TELEPHONE ENCOUNTER (OUTPATIENT)
Dept: CALL CENTER | Facility: HOSPITAL | Age: 44
End: 2024-07-03
Payer: COMMERCIAL

## 2024-07-03 NOTE — OUTREACH NOTE
Call Center TCM Note      Flowsheet Row Responses   [facility] patient discharged from? [place]   Does the patient have one of the following disease processes/diagnoses(primary or secondary)? Other   TCM attempt successful? Yes   Call start time 1127   Call end time 1129   General alerts for this patient Patient is from [place] and will be going back there soon.   Discharge diagnosis Chest pain   Meds reviewed with patient/caregiver? Yes   Is the patient having any side effects they believe may be caused by any medication additions or changes? No   Does the patient have all medications ordered at discharge? Yes   Is the patient taking all medications as directed (includes completed medication regime)? Yes   Does the patient have an appointment with their PCP within 7-14 days of discharge? Yes   Has home health visited the patient within 72 hours of discharge? N/A   Psychosocial issues? No   Did the patient receive a copy of their discharge instructions? Yes   Nursing interventions Reviewed instructions with patient   What is the patient's perception of their health status since discharge? Improving   Is the patient/caregiver able to teach back signs and symptoms related to disease process for when to call PCP? Yes   Is the patient/caregiver able to teach back signs and symptoms related to disease process for when to call 911? Yes   Is the patient/caregiver able to teach back the hierarchy of who to call/visit for symptoms/problems? PCP, Specialist, Home health nurse, Urgent Care, ED, 911 Yes   TCM call completed? Yes   Wrap up additional comments Patient is from [place] and he has a pcp there. He states he will see the pcp he was set up with on 7/8/24, then he will be out of the country for a while, and then he will go back to [place].   Call end time 1129            Rosa Crespo LPN    7/3/2024, 11:35 EDT

## 2024-07-03 NOTE — OUTREACH NOTE
Prep Survey      Flowsheet Row Responses   [facility] patient discharged from? [place]   Is LACE score < 7 ? Yes   Eligibility [facility]   Date of Admission 06/29/24   Date of Discharge 07/02/24   Discharge Disposition Home or Self Care   Discharge diagnosis Chest pain   Does the patient have one of the following disease processes/diagnoses(primary or secondary)? Other   Does the patient have Home health ordered? No   Is there a DME ordered? No   Prep survey completed? Yes            Roya RAMIRES - Registered Nurse

## 2024-08-28 ENCOUNTER — APPOINTMENT (OUTPATIENT)
Dept: GENERAL RADIOLOGY | Facility: HOSPITAL | Age: 44
End: 2024-08-28
Payer: COMMERCIAL

## 2024-08-28 ENCOUNTER — HOSPITAL ENCOUNTER (EMERGENCY)
Facility: HOSPITAL | Age: 44
Discharge: HOME | End: 2024-08-29
Attending: EMERGENCY MEDICINE
Payer: COMMERCIAL

## 2024-08-28 VITALS
HEART RATE: 79 BPM | WEIGHT: 209 LBS | BODY MASS INDEX: 29.92 KG/M2 | OXYGEN SATURATION: 96 % | DIASTOLIC BLOOD PRESSURE: 103 MMHG | SYSTOLIC BLOOD PRESSURE: 173 MMHG | RESPIRATION RATE: 20 BRPM | HEIGHT: 70 IN | TEMPERATURE: 97.7 F

## 2024-08-28 DIAGNOSIS — F10.920 ALCOHOLIC INTOXICATION WITHOUT COMPLICATION: Primary | ICD-10-CM

## 2024-08-28 LAB
ALBUMIN SERPL-MCNC: 4.5 G/DL (ref 3.5–5.2)
ALBUMIN/GLOB SERPL: 1.7 G/DL
ALP SERPL-CCNC: 74 U/L (ref 39–117)
ALT SERPL W P-5'-P-CCNC: 118 U/L (ref 1–41)
ANION GAP SERPL CALCULATED.3IONS-SCNC: 15 MMOL/L (ref 5–15)
AST SERPL-CCNC: 61 U/L (ref 1–40)
BASOPHILS # BLD AUTO: 0.08 10*3/MM3 (ref 0–0.2)
BASOPHILS NFR BLD AUTO: 1.4 % (ref 0–1.5)
BILIRUB SERPL-MCNC: 0.3 MG/DL (ref 0–1.2)
BUN SERPL-MCNC: 14 MG/DL (ref 6–20)
BUN/CREAT SERPL: 18.7 (ref 7–25)
CALCIUM SPEC-SCNC: 8.5 MG/DL (ref 8.6–10.5)
CHLORIDE SERPL-SCNC: 104 MMOL/L (ref 98–107)
CO2 SERPL-SCNC: 24 MMOL/L (ref 22–29)
CREAT SERPL-MCNC: 0.75 MG/DL (ref 0.76–1.27)
DEPRECATED RDW RBC AUTO: 46.3 FL (ref 37–54)
EGFRCR SERPLBLD CKD-EPI 2021: 112.7 ML/MIN/1.73
EOSINOPHIL # BLD AUTO: 0.12 10*3/MM3 (ref 0–0.4)
EOSINOPHIL NFR BLD AUTO: 2.1 % (ref 0.3–6.2)
ERYTHROCYTE [DISTWIDTH] IN BLOOD BY AUTOMATED COUNT: 13.4 % (ref 12.3–15.4)
ETHANOL BLD-MCNC: 238 MG/DL (ref 0–10)
ETHANOL UR QL: 0.24 %
GLOBULIN UR ELPH-MCNC: 2.6 GM/DL
GLUCOSE SERPL-MCNC: 183 MG/DL (ref 65–99)
HCT VFR BLD AUTO: 50.5 % (ref 37.5–51)
HGB BLD-MCNC: 17.2 G/DL (ref 13–17.7)
HOLD SPECIMEN: NORMAL
HOLD SPECIMEN: NORMAL
IMM GRANULOCYTES # BLD AUTO: 0.01 10*3/MM3 (ref 0–0.05)
IMM GRANULOCYTES NFR BLD AUTO: 0.2 % (ref 0–0.5)
LYMPHOCYTES # BLD AUTO: 1.78 10*3/MM3 (ref 0.7–3.1)
LYMPHOCYTES NFR BLD AUTO: 31.1 % (ref 19.6–45.3)
MAGNESIUM SERPL-MCNC: 2 MG/DL (ref 1.6–2.6)
MCH RBC QN AUTO: 32.1 PG (ref 26.6–33)
MCHC RBC AUTO-ENTMCNC: 34.1 G/DL (ref 31.5–35.7)
MCV RBC AUTO: 94.2 FL (ref 79–97)
MONOCYTES # BLD AUTO: 0.27 10*3/MM3 (ref 0.1–0.9)
MONOCYTES NFR BLD AUTO: 4.7 % (ref 5–12)
NEUTROPHILS NFR BLD AUTO: 3.47 10*3/MM3 (ref 1.7–7)
NEUTROPHILS NFR BLD AUTO: 60.5 % (ref 42.7–76)
NRBC BLD AUTO-RTO: 0 /100 WBC (ref 0–0.2)
NT-PROBNP SERPL-MCNC: <36 PG/ML (ref 0–450)
PLATELET # BLD AUTO: 247 10*3/MM3 (ref 140–450)
PMV BLD AUTO: 10.8 FL (ref 6–12)
POTASSIUM SERPL-SCNC: 3.7 MMOL/L (ref 3.5–5.2)
PROT SERPL-MCNC: 7.1 G/DL (ref 6–8.5)
QT INTERVAL: 337 MS
QTC INTERVAL: 439 MS
RBC # BLD AUTO: 5.36 10*6/MM3 (ref 4.14–5.8)
SODIUM SERPL-SCNC: 143 MMOL/L (ref 136–145)
TROPONIN T SERPL HS-MCNC: 24 NG/L
TROPONIN T SERPL HS-MCNC: 34 NG/L
WBC NRBC COR # BLD AUTO: 5.73 10*3/MM3 (ref 3.4–10.8)
WHOLE BLOOD HOLD COAG: NORMAL
WHOLE BLOOD HOLD SPECIMEN: NORMAL

## 2024-08-28 PROCEDURE — 84484 ASSAY OF TROPONIN QUANT: CPT | Performed by: PHYSICIAN ASSISTANT

## 2024-08-28 PROCEDURE — 84484 ASSAY OF TROPONIN QUANT: CPT | Performed by: EMERGENCY MEDICINE

## 2024-08-28 PROCEDURE — 99284 EMERGENCY DEPT VISIT MOD MDM: CPT

## 2024-08-28 PROCEDURE — 93005 ELECTROCARDIOGRAM TRACING: CPT

## 2024-08-28 PROCEDURE — 96375 TX/PRO/DX INJ NEW DRUG ADDON: CPT

## 2024-08-28 PROCEDURE — 93010 ELECTROCARDIOGRAM REPORT: CPT | Performed by: INTERNAL MEDICINE

## 2024-08-28 PROCEDURE — 25810000003 LACTATED RINGERS SOLUTION: Performed by: PHYSICIAN ASSISTANT

## 2024-08-28 PROCEDURE — 80053 COMPREHEN METABOLIC PANEL: CPT | Performed by: EMERGENCY MEDICINE

## 2024-08-28 PROCEDURE — 83880 ASSAY OF NATRIURETIC PEPTIDE: CPT

## 2024-08-28 PROCEDURE — 36415 COLL VENOUS BLD VENIPUNCTURE: CPT

## 2024-08-28 PROCEDURE — 82077 ASSAY SPEC XCP UR&BREATH IA: CPT

## 2024-08-28 PROCEDURE — 83735 ASSAY OF MAGNESIUM: CPT | Performed by: EMERGENCY MEDICINE

## 2024-08-28 PROCEDURE — 25010000002 THIAMINE HCL 200 MG/2ML SOLUTION: Performed by: PHYSICIAN ASSISTANT

## 2024-08-28 PROCEDURE — 85025 COMPLETE CBC W/AUTO DIFF WBC: CPT

## 2024-08-28 PROCEDURE — 93005 ELECTROCARDIOGRAM TRACING: CPT | Performed by: EMERGENCY MEDICINE

## 2024-08-28 PROCEDURE — 96361 HYDRATE IV INFUSION ADD-ON: CPT

## 2024-08-28 PROCEDURE — 71045 X-RAY EXAM CHEST 1 VIEW: CPT

## 2024-08-28 PROCEDURE — 96365 THER/PROPH/DIAG IV INF INIT: CPT

## 2024-08-28 RX ORDER — SODIUM CHLORIDE 0.9 % (FLUSH) 0.9 %
10 SYRINGE (ML) INJECTION AS NEEDED
Status: DISCONTINUED | OUTPATIENT
Start: 2024-08-28 | End: 2024-08-29 | Stop reason: HOSPADM

## 2024-08-28 RX ORDER — THIAMINE HYDROCHLORIDE 100 MG/ML
100 INJECTION, SOLUTION INTRAMUSCULAR; INTRAVENOUS ONCE
Status: COMPLETED | OUTPATIENT
Start: 2024-08-28 | End: 2024-08-28

## 2024-08-28 RX ADMIN — SODIUM CHLORIDE, POTASSIUM CHLORIDE, SODIUM LACTATE AND CALCIUM CHLORIDE 1000 ML: 600; 310; 30; 20 INJECTION, SOLUTION INTRAVENOUS at 21:06

## 2024-08-28 RX ADMIN — FOLIC ACID 1 MG: 5 INJECTION, SOLUTION INTRAMUSCULAR; INTRAVENOUS; SUBCUTANEOUS at 21:46

## 2024-08-28 RX ADMIN — THIAMINE HYDROCHLORIDE 100 MG: 100 INJECTION, SOLUTION INTRAMUSCULAR; INTRAVENOUS at 21:27

## 2024-08-29 ENCOUNTER — HOSPITAL ENCOUNTER (INPATIENT)
Facility: HOSPITAL | Age: 44
LOS: 5 days | Discharge: HOME | End: 2024-09-04
Attending: EMERGENCY MEDICINE | Admitting: STUDENT IN AN ORGANIZED HEALTH CARE EDUCATION/TRAINING PROGRAM
Payer: COMMERCIAL

## 2024-08-29 DIAGNOSIS — F10.939 ALCOHOL WITHDRAWAL SYNDROME WITH COMPLICATION: Primary | ICD-10-CM

## 2024-08-29 LAB
ANION GAP SERPL CALCULATED.3IONS-SCNC: 12 MMOL/L (ref 5–15)
BASOPHILS # BLD AUTO: 0.07 10*3/MM3 (ref 0–0.2)
BASOPHILS NFR BLD AUTO: 1.5 % (ref 0–1.5)
BUN SERPL-MCNC: 13 MG/DL (ref 6–20)
BUN/CREAT SERPL: 20.6 (ref 7–25)
CALCIUM SPEC-SCNC: 8.2 MG/DL (ref 8.6–10.5)
CHLORIDE SERPL-SCNC: 105 MMOL/L (ref 98–107)
CO2 SERPL-SCNC: 21 MMOL/L (ref 22–29)
CREAT SERPL-MCNC: 0.63 MG/DL (ref 0.76–1.27)
DEPRECATED RDW RBC AUTO: 45.2 FL (ref 37–54)
EGFRCR SERPLBLD CKD-EPI 2021: 118.8 ML/MIN/1.73
EOSINOPHIL # BLD AUTO: 0.08 10*3/MM3 (ref 0–0.4)
EOSINOPHIL NFR BLD AUTO: 1.7 % (ref 0.3–6.2)
ERYTHROCYTE [DISTWIDTH] IN BLOOD BY AUTOMATED COUNT: 13.2 % (ref 12.3–15.4)
ETHANOL BLD-MCNC: 31 MG/DL (ref 0–10)
ETHANOL UR QL: 0.03 %
GLUCOSE BLDC GLUCOMTR-MCNC: 128 MG/DL (ref 70–130)
GLUCOSE BLDC GLUCOMTR-MCNC: 156 MG/DL (ref 70–130)
GLUCOSE BLDC GLUCOMTR-MCNC: 158 MG/DL (ref 70–130)
GLUCOSE BLDC GLUCOMTR-MCNC: 159 MG/DL (ref 70–130)
GLUCOSE BLDC GLUCOMTR-MCNC: 170 MG/DL (ref 70–130)
GLUCOSE SERPL-MCNC: 183 MG/DL (ref 65–99)
HCT VFR BLD AUTO: 44.2 % (ref 37.5–51)
HGB BLD-MCNC: 15.1 G/DL (ref 13–17.7)
IMM GRANULOCYTES # BLD AUTO: 0.01 10*3/MM3 (ref 0–0.05)
IMM GRANULOCYTES NFR BLD AUTO: 0.2 % (ref 0–0.5)
LYMPHOCYTES # BLD AUTO: 1.13 10*3/MM3 (ref 0.7–3.1)
LYMPHOCYTES NFR BLD AUTO: 24.5 % (ref 19.6–45.3)
MCH RBC QN AUTO: 32.2 PG (ref 26.6–33)
MCHC RBC AUTO-ENTMCNC: 34.2 G/DL (ref 31.5–35.7)
MCV RBC AUTO: 94.2 FL (ref 79–97)
MONOCYTES # BLD AUTO: 0.32 10*3/MM3 (ref 0.1–0.9)
MONOCYTES NFR BLD AUTO: 6.9 % (ref 5–12)
NEUTROPHILS NFR BLD AUTO: 3.01 10*3/MM3 (ref 1.7–7)
NEUTROPHILS NFR BLD AUTO: 65.2 % (ref 42.7–76)
NRBC BLD AUTO-RTO: 0 /100 WBC (ref 0–0.2)
PLATELET # BLD AUTO: 182 10*3/MM3 (ref 140–450)
PMV BLD AUTO: 10.9 FL (ref 6–12)
POTASSIUM SERPL-SCNC: 3.6 MMOL/L (ref 3.5–5.2)
QT INTERVAL: 354 MS
QTC INTERVAL: 438 MS
RBC # BLD AUTO: 4.69 10*6/MM3 (ref 4.14–5.8)
SODIUM SERPL-SCNC: 138 MMOL/L (ref 136–145)
WBC NRBC COR # BLD AUTO: 4.62 10*3/MM3 (ref 3.4–10.8)

## 2024-08-29 PROCEDURE — 25010000002 PHENOBARBITAL PER 120 MG: Performed by: STUDENT IN AN ORGANIZED HEALTH CARE EDUCATION/TRAINING PROGRAM

## 2024-08-29 PROCEDURE — 25810000003 SODIUM CHLORIDE 0.9 % SOLUTION: Performed by: EMERGENCY MEDICINE

## 2024-08-29 PROCEDURE — 90791 PSYCH DIAGNOSTIC EVALUATION: CPT

## 2024-08-29 PROCEDURE — G0378 HOSPITAL OBSERVATION PER HR: HCPCS

## 2024-08-29 PROCEDURE — 36415 COLL VENOUS BLD VENIPUNCTURE: CPT | Performed by: NURSE PRACTITIONER

## 2024-08-29 PROCEDURE — 63710000001 INSULIN LISPRO (HUMAN) PER 5 UNITS: Performed by: NURSE PRACTITIONER

## 2024-08-29 PROCEDURE — 82948 REAGENT STRIP/BLOOD GLUCOSE: CPT

## 2024-08-29 PROCEDURE — 85025 COMPLETE CBC W/AUTO DIFF WBC: CPT | Performed by: NURSE PRACTITIONER

## 2024-08-29 PROCEDURE — 93010 ELECTROCARDIOGRAM REPORT: CPT | Performed by: INTERNAL MEDICINE

## 2024-08-29 PROCEDURE — 25810000003 SODIUM CHLORIDE 0.9 % SOLUTION: Performed by: NURSE PRACTITIONER

## 2024-08-29 PROCEDURE — 93005 ELECTROCARDIOGRAM TRACING: CPT | Performed by: EMERGENCY MEDICINE

## 2024-08-29 PROCEDURE — 25010000002 LORAZEPAM PER 2 MG: Performed by: EMERGENCY MEDICINE

## 2024-08-29 PROCEDURE — 82077 ASSAY SPEC XCP UR&BREATH IA: CPT | Performed by: EMERGENCY MEDICINE

## 2024-08-29 PROCEDURE — 25010000002 LORAZEPAM PER 2 MG: Performed by: NURSE PRACTITIONER

## 2024-08-29 PROCEDURE — 25010000002 THIAMINE PER 100 MG: Performed by: NURSE PRACTITIONER

## 2024-08-29 PROCEDURE — 80048 BASIC METABOLIC PNL TOTAL CA: CPT | Performed by: NURSE PRACTITIONER

## 2024-08-29 PROCEDURE — 99285 EMERGENCY DEPT VISIT HI MDM: CPT

## 2024-08-29 RX ORDER — NICOTINE POLACRILEX 4 MG
15 LOZENGE BUCCAL
Status: DISCONTINUED | OUTPATIENT
Start: 2024-08-29 | End: 2024-09-04 | Stop reason: HOSPADM

## 2024-08-29 RX ORDER — BISACODYL 10 MG
10 SUPPOSITORY, RECTAL RECTAL DAILY PRN
Status: DISCONTINUED | OUTPATIENT
Start: 2024-08-29 | End: 2024-09-04 | Stop reason: HOSPADM

## 2024-08-29 RX ORDER — SODIUM CHLORIDE 0.9 % (FLUSH) 0.9 %
10 SYRINGE (ML) INJECTION EVERY 12 HOURS SCHEDULED
Status: DISCONTINUED | OUTPATIENT
Start: 2024-08-29 | End: 2024-09-04 | Stop reason: HOSPADM

## 2024-08-29 RX ORDER — ACETAMINOPHEN 160 MG/5ML
650 SOLUTION ORAL EVERY 4 HOURS PRN
Status: DISCONTINUED | OUTPATIENT
Start: 2024-08-29 | End: 2024-09-04 | Stop reason: HOSPADM

## 2024-08-29 RX ORDER — PREGABALIN 25 MG/1
25 CAPSULE ORAL DAILY
Status: ON HOLD | COMMUNITY
End: 2024-09-04

## 2024-08-29 RX ORDER — FOLIC ACID 1 MG/1
1 TABLET ORAL DAILY
Status: DISCONTINUED | OUTPATIENT
Start: 2024-08-29 | End: 2024-09-04 | Stop reason: HOSPADM

## 2024-08-29 RX ORDER — PHENOBARBITAL SODIUM 65 MG/ML
65 INJECTION, SOLUTION INTRAMUSCULAR; INTRAVENOUS ONCE
Status: COMPLETED | OUTPATIENT
Start: 2024-08-30 | End: 2024-08-30

## 2024-08-29 RX ORDER — POLYETHYLENE GLYCOL 3350 17 G/17G
17 POWDER, FOR SOLUTION ORAL DAILY PRN
Status: DISCONTINUED | OUTPATIENT
Start: 2024-08-29 | End: 2024-09-04 | Stop reason: HOSPADM

## 2024-08-29 RX ORDER — THIAMINE HYDROCHLORIDE 100 MG/ML
200 INJECTION, SOLUTION INTRAMUSCULAR; INTRAVENOUS EVERY 8 HOURS SCHEDULED
Status: COMPLETED | OUTPATIENT
Start: 2024-08-29 | End: 2024-09-02

## 2024-08-29 RX ORDER — LORAZEPAM 2 MG/ML
1 INJECTION INTRAMUSCULAR
Status: DISCONTINUED | OUTPATIENT
Start: 2024-08-29 | End: 2024-09-03

## 2024-08-29 RX ORDER — AMOXICILLIN 250 MG
2 CAPSULE ORAL 2 TIMES DAILY PRN
Status: DISCONTINUED | OUTPATIENT
Start: 2024-08-29 | End: 2024-09-04 | Stop reason: HOSPADM

## 2024-08-29 RX ORDER — LORAZEPAM 2 MG/ML
2 INJECTION INTRAMUSCULAR
Status: DISCONTINUED | OUTPATIENT
Start: 2024-08-29 | End: 2024-09-03

## 2024-08-29 RX ORDER — INSULIN LISPRO 100 [IU]/ML
2-7 INJECTION, SOLUTION INTRAVENOUS; SUBCUTANEOUS
Status: DISCONTINUED | OUTPATIENT
Start: 2024-08-29 | End: 2024-09-04 | Stop reason: HOSPADM

## 2024-08-29 RX ORDER — DEXTROSE MONOHYDRATE 25 G/50ML
25 INJECTION, SOLUTION INTRAVENOUS
Status: DISCONTINUED | OUTPATIENT
Start: 2024-08-29 | End: 2024-09-04 | Stop reason: HOSPADM

## 2024-08-29 RX ORDER — LOSARTAN POTASSIUM 50 MG/1
50 TABLET ORAL
Status: DISCONTINUED | OUTPATIENT
Start: 2024-08-29 | End: 2024-09-04 | Stop reason: HOSPADM

## 2024-08-29 RX ORDER — ALPRAZOLAM 1 MG
TABLET ORAL
COMMUNITY
Start: 2024-08-21 | End: 2024-09-04 | Stop reason: HOSPADM

## 2024-08-29 RX ORDER — LORAZEPAM 1 MG/1
1 TABLET ORAL
Status: DISCONTINUED | OUTPATIENT
Start: 2024-08-29 | End: 2024-09-03

## 2024-08-29 RX ORDER — PHENOBARBITAL 32.4 MG/1
32.4 TABLET ORAL ONCE
Status: COMPLETED | OUTPATIENT
Start: 2024-09-01 | End: 2024-09-01

## 2024-08-29 RX ORDER — LORAZEPAM 1 MG/1
2 TABLET ORAL
Status: DISCONTINUED | OUTPATIENT
Start: 2024-08-29 | End: 2024-09-03

## 2024-08-29 RX ORDER — SODIUM CHLORIDE 0.9 % (FLUSH) 0.9 %
10 SYRINGE (ML) INJECTION AS NEEDED
Status: DISCONTINUED | OUTPATIENT
Start: 2024-08-29 | End: 2024-09-04 | Stop reason: HOSPADM

## 2024-08-29 RX ORDER — PHENOBARBITAL 32.4 MG/1
32.4 TABLET ORAL ONCE
Status: COMPLETED | OUTPATIENT
Start: 2024-08-31 | End: 2024-08-31

## 2024-08-29 RX ORDER — PHENYTOIN SODIUM 100 MG/1
100 CAPSULE, EXTENDED RELEASE ORAL EVERY 6 HOURS SCHEDULED
Status: DISCONTINUED | OUTPATIENT
Start: 2024-08-29 | End: 2024-09-04 | Stop reason: HOSPADM

## 2024-08-29 RX ORDER — ACETAMINOPHEN 325 MG/1
650 TABLET ORAL EVERY 4 HOURS PRN
Status: DISCONTINUED | OUTPATIENT
Start: 2024-08-29 | End: 2024-09-04 | Stop reason: HOSPADM

## 2024-08-29 RX ORDER — NITROGLYCERIN 0.4 MG/1
0.4 TABLET SUBLINGUAL
Status: DISCONTINUED | OUTPATIENT
Start: 2024-08-29 | End: 2024-09-04 | Stop reason: HOSPADM

## 2024-08-29 RX ORDER — ASPIRIN 81 MG/1
81 TABLET, CHEWABLE ORAL DAILY
Status: DISCONTINUED | OUTPATIENT
Start: 2024-08-29 | End: 2024-09-04 | Stop reason: HOSPADM

## 2024-08-29 RX ORDER — BISACODYL 5 MG/1
5 TABLET, DELAYED RELEASE ORAL DAILY PRN
Status: DISCONTINUED | OUTPATIENT
Start: 2024-08-29 | End: 2024-09-04 | Stop reason: HOSPADM

## 2024-08-29 RX ORDER — NEBIVOLOL 10 MG/1
10 TABLET ORAL DAILY
Status: DISCONTINUED | OUTPATIENT
Start: 2024-08-29 | End: 2024-09-04 | Stop reason: HOSPADM

## 2024-08-29 RX ORDER — ACETAMINOPHEN 650 MG/1
650 SUPPOSITORY RECTAL EVERY 4 HOURS PRN
Status: DISCONTINUED | OUTPATIENT
Start: 2024-08-29 | End: 2024-09-04 | Stop reason: HOSPADM

## 2024-08-29 RX ORDER — LORAZEPAM 2 MG/ML
1 INJECTION INTRAMUSCULAR ONCE
Status: DISCONTINUED | OUTPATIENT
Start: 2024-08-29 | End: 2024-08-29

## 2024-08-29 RX ORDER — LORAZEPAM 2 MG/ML
1 INJECTION INTRAMUSCULAR ONCE
Status: COMPLETED | OUTPATIENT
Start: 2024-08-29 | End: 2024-08-29

## 2024-08-29 RX ORDER — SODIUM CHLORIDE 9 MG/ML
40 INJECTION, SOLUTION INTRAVENOUS AS NEEDED
Status: DISCONTINUED | OUTPATIENT
Start: 2024-08-29 | End: 2024-09-04 | Stop reason: HOSPADM

## 2024-08-29 RX ORDER — IBUPROFEN 600 MG/1
1 TABLET ORAL
Status: DISCONTINUED | OUTPATIENT
Start: 2024-08-29 | End: 2024-09-04 | Stop reason: HOSPADM

## 2024-08-29 RX ORDER — SODIUM CHLORIDE 9 MG/ML
100 INJECTION, SOLUTION INTRAVENOUS CONTINUOUS
Status: DISCONTINUED | OUTPATIENT
Start: 2024-08-29 | End: 2024-09-03

## 2024-08-29 RX ADMIN — LORAZEPAM 1 MG: 1 TABLET ORAL at 21:45

## 2024-08-29 RX ADMIN — ASPIRIN 81 MG: 81 TABLET, CHEWABLE ORAL at 14:36

## 2024-08-29 RX ADMIN — PHENOBARBITAL SODIUM 219.1 MG: 65 INJECTION INTRAMUSCULAR at 17:07

## 2024-08-29 RX ADMIN — THIAMINE HYDROCHLORIDE 200 MG: 100 INJECTION, SOLUTION INTRAMUSCULAR; INTRAVENOUS at 09:44

## 2024-08-29 RX ADMIN — LORAZEPAM 1 MG: 1 TABLET ORAL at 17:17

## 2024-08-29 RX ADMIN — Medication 10 ML: at 21:32

## 2024-08-29 RX ADMIN — SERTRALINE 50 MG: 50 TABLET, FILM COATED ORAL at 14:34

## 2024-08-29 RX ADMIN — PHENOBARBITAL SODIUM 219.1 MG: 65 INJECTION, SOLUTION INTRAMUSCULAR; INTRAVENOUS at 14:46

## 2024-08-29 RX ADMIN — LOSARTAN POTASSIUM 50 MG: 50 TABLET, FILM COATED ORAL at 14:35

## 2024-08-29 RX ADMIN — FOLIC ACID 1 MG: 1 TABLET ORAL at 09:44

## 2024-08-29 RX ADMIN — LORAZEPAM 1 MG: 2 INJECTION INTRAMUSCULAR; INTRAVENOUS at 03:54

## 2024-08-29 RX ADMIN — THIAMINE HYDROCHLORIDE 200 MG: 100 INJECTION, SOLUTION INTRAMUSCULAR; INTRAVENOUS at 21:31

## 2024-08-29 RX ADMIN — LORAZEPAM 2 MG: 2 INJECTION INTRAMUSCULAR; INTRAVENOUS at 07:03

## 2024-08-29 RX ADMIN — IBUPROFEN 600 MG: 200 TABLET, FILM COATED ORAL at 00:05

## 2024-08-29 RX ADMIN — THIAMINE HYDROCHLORIDE 200 MG: 100 INJECTION, SOLUTION INTRAMUSCULAR; INTRAVENOUS at 14:38

## 2024-08-29 RX ADMIN — SODIUM CHLORIDE 1000 ML: 9 INJECTION, SOLUTION INTRAVENOUS at 03:16

## 2024-08-29 RX ADMIN — SODIUM CHLORIDE 100 ML/HR: 9 INJECTION, SOLUTION INTRAVENOUS at 07:09

## 2024-08-29 RX ADMIN — INSULIN LISPRO 2 UNITS: 100 INJECTION, SOLUTION INTRAVENOUS; SUBCUTANEOUS at 17:17

## 2024-08-29 RX ADMIN — SODIUM CHLORIDE 100 ML/HR: 9 INJECTION, SOLUTION INTRAVENOUS at 17:06

## 2024-08-29 RX ADMIN — INSULIN LISPRO 2 UNITS: 100 INJECTION, SOLUTION INTRAVENOUS; SUBCUTANEOUS at 07:04

## 2024-08-29 RX ADMIN — PHENOBARBITAL SODIUM 291.9 MG: 65 INJECTION, SOLUTION INTRAMUSCULAR; INTRAVENOUS at 11:53

## 2024-08-29 RX ADMIN — PHENYTOIN SODIUM 100 MG: 100 CAPSULE ORAL at 14:37

## 2024-08-29 RX ADMIN — NEBIVOLOL 10 MG: 10 TABLET ORAL at 14:37

## 2024-08-29 RX ADMIN — LORAZEPAM 1 MG: 1 TABLET ORAL at 14:37

## 2024-08-29 RX ADMIN — PHENYTOIN SODIUM 100 MG: 100 CAPSULE ORAL at 17:07

## 2024-08-29 RX ADMIN — ACETAMINOPHEN 325MG 650 MG: 325 TABLET ORAL at 17:25

## 2024-08-29 RX ADMIN — Medication 10 ML: at 09:49
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
## 2024-08-29 NOTE — PROGRESS NOTES
Attempted [facility] consult twice but pt was sleeping soundly and did not wake to clinician's voice on both occasions. Access Center will try again tomorrow to complete consult.

## 2024-08-29 NOTE — PLAN OF CARE
Problem: Adult Inpatient Plan of Care  Goal: Plan of Care Review  Outcome: Ongoing, Progressing  Flowsheets  Taken 8/29/2024 1808 by Quinn Moore RN  Progress: improving  Outcome Evaluation: CIWA at 12 early this morning. Pt slept most of the remaining morning. Phenobarbitol protocol started. CIWA around 7-8 through the afternoon. Ativan used. Appetite has been really good. Pt plans on following through with withdrawal program in [place] upon FL.  Taken 8/29/2024 0651 by Shalonda Chavez RN  Plan of Care Reviewed With: patient  Goal: Patient-Specific Goal (Individualized)  Outcome: Ongoing, Progressing  Goal: Absence of Hospital-Acquired Illness or Injury  Outcome: Ongoing, Progressing  Intervention: Identify and Manage Fall Risk  Recent Flowsheet Documentation  Taken 8/29/2024 1600 by Quinn Moore RN  Safety Promotion/Fall Prevention:   activity supervised   assistive device/personal items within reach   clutter free environment maintained   fall prevention program maintained   nonskid shoes/slippers when out of bed   gait belt   room organization consistent   safety round/check completed  Taken 8/29/2024 1429 by Quinn Moore RN  Safety Promotion/Fall Prevention:   activity supervised   assistive device/personal items within reach   clutter free environment maintained   fall prevention program maintained   nonskid shoes/slippers when out of bed   gait belt   room organization consistent   safety round/check completed  Taken 8/29/2024 1200 by Quinn Moore RN  Safety Promotion/Fall Prevention:   activity supervised   assistive device/personal items within reach   clutter free environment maintained   fall prevention program maintained   nonskid shoes/slippers when out of bed   gait belt   room organization consistent   safety round/check completed  Taken 8/29/2024 0930 by Quinn Moore RN  Safety Promotion/Fall Prevention:   activity supervised   assistive device/personal items within  reach   clutter free environment maintained   fall prevention program maintained   nonskid shoes/slippers when out of bed   gait belt   room organization consistent   safety round/check completed  Intervention: Prevent Skin Injury  Recent Flowsheet Documentation  Taken 8/29/2024 1429 by Quinn Moore RN  Body Position: position changed independently  Taken 8/29/2024 0930 by Quinn Moore RN  Body Position: position changed independently  Goal: Optimal Comfort and Wellbeing  Outcome: Ongoing, Progressing  Intervention: Monitor Pain and Promote Comfort  Recent Flowsheet Documentation  Taken 8/29/2024 1725 by Quinn Moore RN  Pain Management Interventions: see MAR  Intervention: Provide Person-Centered Care  Recent Flowsheet Documentation  Taken 8/29/2024 0930 by Quinn Moore RN  Trust Relationship/Rapport: care explained  Goal: Readiness for Transition of Care  Outcome: Ongoing, Progressing     Problem: Fall Injury Risk  Goal: Absence of Fall and Fall-Related Injury  Outcome: Ongoing, Progressing  Intervention: Promote Injury-Free Environment  Recent Flowsheet Documentation  Taken 8/29/2024 1600 by Quinn Moore RN  Safety Promotion/Fall Prevention:   activity supervised   assistive device/personal items within reach   clutter free environment maintained   fall prevention program maintained   nonskid shoes/slippers when out of bed   gait belt   room organization consistent   safety round/check completed  Taken 8/29/2024 1429 by Quinn Moore RN  Safety Promotion/Fall Prevention:   activity supervised   assistive device/personal items within reach   clutter free environment maintained   fall prevention program maintained   nonskid shoes/slippers when out of bed   gait belt   room organization consistent   safety round/check completed  Taken 8/29/2024 1200 by Quinn Moore RN  Safety Promotion/Fall Prevention:   activity supervised   assistive device/personal items within reach   clutter free  environment maintained   fall prevention program maintained   nonskid shoes/slippers when out of bed   gait belt   room organization consistent   safety round/check completed  Taken 8/29/2024 0930 by Quinn Moore, RN  Safety Promotion/Fall Prevention:   activity supervised   assistive device/personal items within reach   clutter free environment maintained   fall prevention program maintained   nonskid shoes/slippers when out of bed   gait belt   room organization consistent   safety round/check completed   Goal Outcome Evaluation:           Progress: improving  Outcome Evaluation: CIWA at 12 early this morning. Pt slept most of the remaining morning. Phenobarbitol protocol started. CIWA around 7-8 through the afternoon. Ativan used. Appetite has been really good. Pt plans on following through with withdrawal program in [facility].

## 2024-08-29 NOTE — ED PROVIDER NOTES
EMERGENCY DEPARTMENT ENCOUNTER  Room Number:  35/35  PCP: Provider, No Known  Independent Historians: Patient      HPI:  Chief Complaint: had concerns including Difficulty Urinating and Alcohol Problem.     A complete HPI/ROS/PMH/PSH/SH/FH are unobtainable due to: Poor historian, intoxicated    Chronic or social conditions impacting patient care (Social Determinants of Health): Neighborhood and Physical Environment (Housing Instability, Lack of Reliable Transportation, Safety, Zip Code/Geography)      Context: Bryant Edmonds is a 44 y.o. male with a medical history of TBI, PTSD, generalized anxiety disorder, hypertension, CAD, type 2 diabetes, and alcohol abuse presents the emergency department for alcohol detox.  Patient reports he drinks a gallon of vodka a day.  He says he was inpatient last week and stayed for 10 days and immediately left and started drinking again.  He says he is from [place] but is here working and his wife told him to come to the hospital to seek treatment.  He says when in the hospital last week his kidney shutdown.  Patient is a poor historian as he is actively intoxicated.  He says he is currently staying in a motel.  He denies SI/HI.  He has no particular symptoms at this time, he just says he is here so his kidneys do not shut down.      Review of prior external notes (non-ED) -and- Review of prior external test results outside of this encounter:   Patient was admitted to [facility] for detox on 8/10/2024 and left AGAINST MEDICAL ADVICE on 8/11/2024 at the time of admission creatinine was 0.83    Patient was admitted to the hospital on 7/13/2024 and discharged on 7/18/2024 for alcohol withdrawal.  He typically consumes 2 L of vodka per day.  He has had no prior withdrawal seizures but has had seizures from a TBI.  Loaded with phenobarbital on arrival and [facility] protocol was followed.  Electrolytes and vitamins were replaced.  He had a CT  PE study as well as a CT of the abdomen pelvis  which showed no acute findings because he was complaining of chest pain, dyspnea, and abdominal pain.  They suspected his chest pain was costochondritis or musculoskeletal related.      PAST MEDICAL HISTORY  Active Ambulatory Problems     Diagnosis Date Noted    Chest pain 06/29/2024    ROSALINO (generalized anxiety disorder) 06/29/2024    PTSD (post-traumatic stress disorder) 06/29/2024    TBI (traumatic brain injury) 06/29/2024    Primary hypertension 06/29/2024    Coronary artery disease involving native coronary artery with unstable angina pectoris 06/29/2024    Type 2 diabetes mellitus with hyperglycemia, with long-term current use of insulin 06/29/2024    Seizure disorder 06/29/2024    Alcohol use 06/29/2024    History of noncompliance with medical treatment 06/29/2024     Resolved Ambulatory Problems     Diagnosis Date Noted    No Resolved Ambulatory Problems     Past Medical History:   Diagnosis Date    Acute renal failure (ARF)     Hearing loss     Myocardial infarction     Night terror     Seizures          PAST SURGICAL HISTORY  Past Surgical History:   Procedure Laterality Date    CORONARY ANGIOPLASTY WITH STENT PLACEMENT      FRACTURE SURGERY           FAMILY HISTORY  No family history on file.      SOCIAL HISTORY  Social History     Socioeconomic History    Marital status: Single   Tobacco Use    Smoking status: Never    Smokeless tobacco: Current     Types: Snuff   Vaping Use    Vaping status: Never Used   Substance and Sexual Activity    Alcohol use: Yes     Comment: 1 liter vodka; admites to intermittent binging    Drug use: Defer    Sexual activity: Defer         ALLERGIES  Patient has no known allergies.      REVIEW OF SYSTEMS  Included in HPI  All systems reviewed and negative except for those discussed in HPI.      PHYSICAL EXAM    I have reviewed the triage vital signs and nursing notes.    ED Triage Vitals   Temp Heart Rate Resp BP SpO2   08/28/24 1945 08/28/24 1945 08/28/24 1945 08/28/24 1952  08/28/24 1945   97.7 °F (36.5 °C) 79 20 (!) 173/103 96 %      Temp src Heart Rate Source Patient Position BP Location FiO2 (%)   08/28/24 1945 08/28/24 1945 08/28/24 1952 08/28/24 1952 --   Tympanic Monitor Lying Right arm        Physical Exam  Constitutional:       Comments: Appears intoxicated, smells of alcohol   HENT:      Head: Normocephalic and atraumatic.      Mouth/Throat:      Mouth: Mucous membranes are moist.   Eyes:      Extraocular Movements: Extraocular movements intact.      Pupils: Pupils are equal, round, and reactive to light.   Cardiovascular:      Rate and Rhythm: Normal rate and regular rhythm.      Pulses: Normal pulses.      Heart sounds: Normal heart sounds.   Pulmonary:      Effort: Pulmonary effort is normal. No respiratory distress.      Breath sounds: Normal breath sounds. No wheezing, rhonchi or rales.   Abdominal:      General: Abdomen is flat. There is no distension.      Palpations: Abdomen is soft.      Tenderness: There is no abdominal tenderness. There is no guarding or rebound.   Musculoskeletal:         General: Normal range of motion.      Cervical back: Normal range of motion and neck supple.   Skin:     General: Skin is warm and dry.      Capillary Refill: Capillary refill takes less than 2 seconds.   Neurological:      General: No focal deficit present.      Mental Status: He is alert and oriented to person, place, and time.   Psychiatric:         Mood and Affect: Mood normal.         Behavior: Behavior normal.           LAB RESULTS  Recent Results (from the past 24 hour(s))   BNP    Collection Time: 08/28/24  8:01 PM    Specimen: Blood   Result Value Ref Range    proBNP <36.0 0.0 - 450.0 pg/mL   Green Top (Gel)    Collection Time: 08/28/24  8:01 PM   Result Value Ref Range    Extra Tube Hold for add-ons.    Lavender Top    Collection Time: 08/28/24  8:01 PM   Result Value Ref Range    Extra Tube hold for add-on    Gold Top - SST    Collection Time: 08/28/24  8:01 PM   Result  Value Ref Range    Extra Tube Hold for add-ons.    Light Blue Top    Collection Time: 08/28/24  8:01 PM   Result Value Ref Range    Extra Tube Hold for add-ons.    CBC Auto Differential    Collection Time: 08/28/24  8:01 PM    Specimen: Blood   Result Value Ref Range    WBC 5.73 3.40 - 10.80 10*3/mm3    RBC 5.36 4.14 - 5.80 10*6/mm3    Hemoglobin 17.2 13.0 - 17.7 g/dL    Hematocrit 50.5 37.5 - 51.0 %    MCV 94.2 79.0 - 97.0 fL    MCH 32.1 26.6 - 33.0 pg    MCHC 34.1 31.5 - 35.7 g/dL    RDW 13.4 12.3 - 15.4 %    RDW-SD 46.3 37.0 - 54.0 fl    MPV 10.8 6.0 - 12.0 fL    Platelets 247 140 - 450 10*3/mm3    Neutrophil % 60.5 42.7 - 76.0 %    Lymphocyte % 31.1 19.6 - 45.3 %    Monocyte % 4.7 (L) 5.0 - 12.0 %    Eosinophil % 2.1 0.3 - 6.2 %    Basophil % 1.4 0.0 - 1.5 %    Immature Grans % 0.2 0.0 - 0.5 %    Neutrophils, Absolute 3.47 1.70 - 7.00 10*3/mm3    Lymphocytes, Absolute 1.78 0.70 - 3.10 10*3/mm3    Monocytes, Absolute 0.27 0.10 - 0.90 10*3/mm3    Eosinophils, Absolute 0.12 0.00 - 0.40 10*3/mm3    Basophils, Absolute 0.08 0.00 - 0.20 10*3/mm3    Immature Grans, Absolute 0.01 0.00 - 0.05 10*3/mm3    nRBC 0.0 0.0 - 0.2 /100 WBC   Ethanol    Collection Time: 08/28/24  8:01 PM    Specimen: Blood   Result Value Ref Range    Ethanol 238 (H) 0 - 10 mg/dL    Ethanol % 0.238 %   ECG 12 Lead ED Triage Standing Order; SOA    Collection Time: 08/28/24  8:04 PM   Result Value Ref Range    QT Interval 337 ms    QTC Interval 439 ms   Comprehensive Metabolic Panel    Collection Time: 08/28/24  9:05 PM    Specimen: Blood   Result Value Ref Range    Glucose 183 (H) 65 - 99 mg/dL    BUN 14 6 - 20 mg/dL    Creatinine 0.75 (L) 0.76 - 1.27 mg/dL    Sodium 143 136 - 145 mmol/L    Potassium 3.7 3.5 - 5.2 mmol/L    Chloride 104 98 - 107 mmol/L    CO2 24.0 22.0 - 29.0 mmol/L    Calcium 8.5 (L) 8.6 - 10.5 mg/dL    Total Protein 7.1 6.0 - 8.5 g/dL    Albumin 4.5 3.5 - 5.2 g/dL    ALT (SGPT) 118 (H) 1 - 41 U/L    AST (SGOT) 61 (H) 1 - 40 U/L     Alkaline Phosphatase 74 39 - 117 U/L    Total Bilirubin 0.3 0.0 - 1.2 mg/dL    Globulin 2.6 gm/dL    A/G Ratio 1.7 g/dL    BUN/Creatinine Ratio 18.7 7.0 - 25.0    Anion Gap 15.0 5.0 - 15.0 mmol/L    eGFR 112.7 >60.0 mL/min/1.73   Single High Sensitivity Troponin T    Collection Time: 08/28/24  9:05 PM    Specimen: Blood   Result Value Ref Range    HS Troponin T 34 (H) <22 ng/L   Magnesium    Collection Time: 08/28/24  9:05 PM    Specimen: Blood   Result Value Ref Range    Magnesium 2.0 1.6 - 2.6 mg/dL   Single High Sensitivity Troponin T    Collection Time: 08/28/24 10:40 PM    Specimen: Blood   Result Value Ref Range    HS Troponin T 24 (H) <22 ng/L   ECG 12 Lead Chest Pain    Collection Time: 08/29/24  1:56 AM   Result Value Ref Range    QT Interval 354 ms    QTC Interval 438 ms         RADIOLOGY  XR Chest 1 View    Result Date: 8/28/2024  XR CHEST 1 VW-  INDICATION: Shortness of breath  COMPARISON: CT chest angiogram 6/29/2024 and radiographs dating back to 1/27/2015      No focal consolidation. No pleural effusion or pneumothorax.  Normal size cardiomediastinal silhouette.  No focal osseous abnormality.   This report was finalized on 8/28/2024 8:56 PM by Dr. Adal England M.D on Workstation: [id number]         MEDICATIONS GIVEN IN ER  Medications   lactated ringers bolus 1,000 mL (0 mL Intravenous Stopped 8/29/24 0009)   thiamine (B-1) injection 100 mg (100 mg Intravenous Given 8/28/24 2127)   folic acid 1 mg in sodium chloride 0.9 % 50 mL IVPB (0 mg Intravenous Stopped 8/28/24 2216)   ibuprofen (ADVIL,MOTRIN) tablet 600 mg (600 mg Oral Given 8/29/24 0005)           OUTPATIENT MEDICATION MANAGEMENT:  No current Epic-ordered facility-administered medications on file.     Current Outpatient Medications Ordered in Epic   Medication Sig Dispense Refill    aspirin 81 MG chewable tablet Chew 1 tablet Daily.      atorvastatin (LIPITOR) 20 MG tablet Take 1 tablet by mouth Every Night. 90 tablet 0    cloNIDine  (CATAPRES) 0.1 MG tablet Take 1 tablet by mouth 2 (Two) Times a Day As Needed for High Blood Pressure (SBP Greater Than 180). 60 tablet 0    folic acid (FOLVITE) 1 MG tablet Take 1 tablet by mouth Daily.      HydrOXYzine Pamoate (VISTARIL PO) Take  by mouth.      losartan (COZAAR) 50 MG tablet Take 1 tablet by mouth Daily. 90 tablet 0    multivitamin with minerals tablet tablet Take 1 tablet by mouth Daily.      nebivolol (BYSTOLIC) 10 MG tablet Take 1 tablet by mouth Daily. 7 tablet 0    phenytoin ER (DILANTIN) 100 MG capsule Take 1 capsule by mouth Every 6 (Six) Hours for 30 days. 120 capsule 0    thiamine (VITAMIN B1) 100 MG tablet Take 1 tablet by mouth Daily.      UNKNOWN TO PATIENT Cholesterol meds             PROGRESS, DATA ANALYSIS, CONSULTS, AND MEDICAL DECISION MAKING  ORDERS PLACED DURING THIS VISIT:  Orders Placed This Encounter   Procedures    XR Chest 1 View    [place] Draw    Comprehensive Metabolic Panel    BNP    Single High Sensitivity Troponin T    CBC Auto Differential    Ethanol    Magnesium    Single High Sensitivity Troponin T    NPO Diet NPO Type: Strict NPO    Undress & Gown    Continuous Pulse Oximetry    Vital Signs    Oxygen Therapy- Nasal Cannula; Titrate 1-6 LPM Per SpO2; 90 - 95%    ECG 12 Lead ED Triage Standing Order; SOA    Insert Peripheral IV    CBC & Differential    Green Top (Gel)    Lavender Top    Gold Top - SST    Light Blue Top       All labs have been independently interpreted by me.  All radiology studies have been reviewed by me. All EKG's have been independently viewed and interpreted by me.  Discussion below represents my analysis of pertinent findings related to patient's condition, differential diagnosis, treatment plan and final disposition.    Differential diagnosis includes but is not limited to:   My differential diagnosis for altered mental status includes but is not limited to:  Hypoglycemia, hyperglycemia, DKA, overdose, ethanol intoxication, thiamine  deficiency, niacin deficiency, hypothymia, hyperviscosity, Jacoby’s disease, hyponatremia, hypernatremia, liver failure, kidney failure, hyper or hypothyroid, no insufficiency, hypoxia, hypercarbia, carbon monoxide poisoning, postanoxic encephalopathy, ischemic stroke, intracranial bleed, subarachnoid hemorrhage, brain tumor, closed head injury, epidural hematoma, epidural hematoma, seizure activity, postictal state, syncopal episode, disseminated encephalomyelitis, central pontine myelinolysis, post cardiac arrest, bacterial meningitis, viral meningitis, fungal meningitis, encephalitis, brain abscess, subdural empyema, hysteria, catatonic state, malingering, hypertensive encephalopathy, vasculitis, TTP, DIC      ED Course:  ED Course as of 08/29/24 0200   Wed Aug 28, 2024   2028 WBC: 5.73 [CC]   2041 Ethanol(!): 238 [CC]   2043 I discussed the case with Dr. Last and he agrees to evaluate the patient at the bedside.    [CC]   2305 ALT (SGPT)(!): 118 [CC]   2305 AST (SGOT)(!): 61 [CC]   2322 HS Troponin T(!): 24  Delta -10 [CC]   2322 Patient has been observed for an extended period of time here in the emergency department and is now ambulatory, tolerating p.o., and speaking in clear sentences.  Patient is clinically sober.   [CC]      ED Course User Index  [CC] Yamel Rodriguez PA-C           AS OF 02:00 EDT VITALS:    BP - (!) 173/103  HR - 79  TEMP - 97.7 °F (36.5 °C) (Tympanic)  O2 SATS - 96%        MDM:  Patient is a 44-year-old male with a history of alcoholism who presents the emergency department today with acute alcohol intoxication.  On arrival here in the emergency department patient is hypertensive but vitals otherwise reassuring.  On my initial evaluation patient appeared intoxicated and reported that he was here in the emergency department requesting detox.  Patient was evaluated with labs which did reveal an elevated ethanol level at 238.  He had some mildly elevated LFTs and initial troponin was  elevated.  On delta troponin was -10.  He is not complaining of any specific chest pain and EKG is nonischemic in nature.  He recently had a CTA of his chest to rule out pulmonary embolism as he also complained of chest pain during a hospitalization in July and the CTA was negative.  I do not suspect pulmonary embolism given his symptoms here today.  Patient had reported he previously had renal failure secondary to his alcohol intake but today his renal function is normal.  On reevaluation after an extended period of time patient is clinically sober.  He is awake, alert, oriented, tolerating p.o. and ambulating without difficulty.  He is not actively in alcohol withdrawal and does not meet criteria for admission to the hospital today.  Educated him on other resources in the community including the [place], [facility], or [facility].  Patient is appropriate for discharge with outpatient follow-up.        DIAGNOSIS  Final diagnoses:   Alcoholic intoxication without complication         DISPOSITION  ED Disposition       ED Disposition   Discharge    Condition   Stable    Comment   --                    Please note that portions of this document were completed with a voice recognition program.    Note Disclaimer: At [facility], we believe that sharing information builds trust and better relationships. You are receiving this note because you recently visited [facility]. It is possible you will see health information before a provider has talked with you about it. This kind of information can be easy to misunderstand. To help you fully understand what it means for your health, we urge you to discuss this note with your provider.     Yamel Rodriguez PA-C  08/29/24 0204

## 2024-08-29 NOTE — CASE MANAGEMENT/SOCIAL WORK
Continued Stay Note  [facility]     Patient Name: Bryant Edmonds  MRN: [id number]  Today's Date: 8/29/2024    Admit Date: 8/29/2024    Plan: Pending   Discharge Plan       Row Name 08/29/24 1348       Plan    Plan Pending    Plan Comments Access to see patient, CCP following for recommendations / discharge needs. CCP will follow to screen patient when appropriate. SAIMA VERA                   Discharge Codes    No documentation.                 Expected Discharge Date and Time       Expected Discharge Date Expected Discharge Time    Sep 2, 2024               SAIMA Sales

## 2024-08-29 NOTE — PLAN OF CARE
Goal Outcome Evaluation:  Plan of Care Reviewed With: patient        Progress: no change  Outcome Evaluation: pt in for alcohol withdrawal. VSS. Will endorse to day shift.

## 2024-08-29 NOTE — ED PROVIDER NOTES
EMERGENCY DEPARTMENT ENCOUNTER    Room Number:  E564/1  PCP: Provider, No Known  Patient Care Team:  Provider, No Known as PCP - General  Provider, No Known   Independent Historians: Patient    HPI:  Chief Complaint: Tremulous, alcohol withdrawal    A complete HPI/ROS/PMH/PSH/SH/FH are unobtainable due to: None    Chronic or social conditions impacting patient care (Social Determinants of Health): None  (Financial Resource Strain / Food Insecurity / Transportation Needs / Physical Activity / Stress / Social Connections / Intimate Partner Violence / Housing Stability)    Context: Bryant Edmonds is a 44 y.o. male who presents to the ED c/o acute alcohol withdrawal.  Patient was recently in the ED with alcohol intoxication.  States that while waiting for his ride he became tremulous and began to experience withdrawal symptoms.  Patient reports having intermittent cold sweats and tremor.  Patient with multiple prior admissions with alcohol withdrawal.  Also with history of benzo use.  Patient states that he drinks 2 L of vodka daily.  States his last drink was around 6 PM yesterday.    Review of prior external notes (non-ED) -and- Review of prior external test results outside of this encounter: I have reviewed patient's discharge summary from [facility] from 7/13/2024    Prescription drug monitoring program review:         PAST MEDICAL HISTORY  Active Ambulatory Problems     Diagnosis Date Noted    Chest pain 06/29/2024    ROSALINO (generalized anxiety disorder) 06/29/2024    PTSD (post-traumatic stress disorder) 06/29/2024    TBI (traumatic brain injury) 06/29/2024    Primary hypertension 06/29/2024    Coronary artery disease involving native coronary artery with unstable angina pectoris 06/29/2024    Type 2 diabetes mellitus with hyperglycemia, with long-term current use of insulin 06/29/2024    Seizure disorder 06/29/2024    Alcohol use 06/29/2024    History of noncompliance with medical treatment  06/29/2024     Resolved Ambulatory Problems     Diagnosis Date Noted    No Resolved Ambulatory Problems     Past Medical History:   Diagnosis Date    Acute renal failure (ARF)     Hearing loss     Myocardial infarction     Night terror     Seizures          PAST SURGICAL HISTORY  Past Surgical History:   Procedure Laterality Date    CORONARY ANGIOPLASTY WITH STENT PLACEMENT      FRACTURE SURGERY           FAMILY HISTORY  No family history on file.      SOCIAL HISTORY  Social History     Socioeconomic History    Marital status: Single   Tobacco Use    Smoking status: Never    Smokeless tobacco: Current     Types: Snuff   Vaping Use    Vaping status: Never Used   Substance and Sexual Activity    Alcohol use: Yes     Comment: 1 liter vodka; admites to intermittent binging    Drug use: Defer    Sexual activity: Defer         ALLERGIES  Patient has no known allergies.        REVIEW OF SYSTEMS  Review of Systems  Included in HPI  All systems reviewed and negative except for those discussed in HPI.      PHYSICAL EXAM    I have reviewed the triage vital signs and nursing notes.    ED Triage Vitals   Temp Heart Rate Resp BP SpO2   08/29/24 0151 08/29/24 0151 08/29/24 0151 08/29/24 0158 08/29/24 0151   97.4 °F (36.3 °C) 95 20 175/88 98 %      Temp src Heart Rate Source Patient Position BP Location FiO2 (%)   08/29/24 0151 08/29/24 0151 08/29/24 0158 -- --   Tympanic Monitor Sitting         Physical Exam  GENERAL: alert, mild acute distress  SKIN: Warm, dry  HENT: Normocephalic, atraumatic  EYES: no scleral icterus  CV: regular rhythm, regular rate  RESPIRATORY: normal effort, lungs clear  ABDOMEN: soft, nontender, nondistended  MUSCULOSKELETAL: no deformity  NEURO: alert, moves all extremities, follows commands, mildly tremulous                                                                LAB RESULTS  Recent Results (from the past 24 hour(s))   BNP    Collection Time: 08/28/24  8:01 PM    Specimen: Blood   Result Value Ref  Range    proBNP <36.0 0.0 - 450.0 pg/mL   Green Top (Gel)    Collection Time: 08/28/24  8:01 PM   Result Value Ref Range    Extra Tube Hold for add-ons.    Lavender Top    Collection Time: 08/28/24  8:01 PM   Result Value Ref Range    Extra Tube hold for add-on    Gold Top - SST    Collection Time: 08/28/24  8:01 PM   Result Value Ref Range    Extra Tube Hold for add-ons.    Light Blue Top    Collection Time: 08/28/24  8:01 PM   Result Value Ref Range    Extra Tube Hold for add-ons.    CBC Auto Differential    Collection Time: 08/28/24  8:01 PM    Specimen: Blood   Result Value Ref Range    WBC 5.73 3.40 - 10.80 10*3/mm3    RBC 5.36 4.14 - 5.80 10*6/mm3    Hemoglobin 17.2 13.0 - 17.7 g/dL    Hematocrit 50.5 37.5 - 51.0 %    MCV 94.2 79.0 - 97.0 fL    MCH 32.1 26.6 - 33.0 pg    MCHC 34.1 31.5 - 35.7 g/dL    RDW 13.4 12.3 - 15.4 %    RDW-SD 46.3 37.0 - 54.0 fl    MPV 10.8 6.0 - 12.0 fL    Platelets 247 140 - 450 10*3/mm3    Neutrophil % 60.5 42.7 - 76.0 %    Lymphocyte % 31.1 19.6 - 45.3 %    Monocyte % 4.7 (L) 5.0 - 12.0 %    Eosinophil % 2.1 0.3 - 6.2 %    Basophil % 1.4 0.0 - 1.5 %    Immature Grans % 0.2 0.0 - 0.5 %    Neutrophils, Absolute 3.47 1.70 - 7.00 10*3/mm3    Lymphocytes, Absolute 1.78 0.70 - 3.10 10*3/mm3    Monocytes, Absolute 0.27 0.10 - 0.90 10*3/mm3    Eosinophils, Absolute 0.12 0.00 - 0.40 10*3/mm3    Basophils, Absolute 0.08 0.00 - 0.20 10*3/mm3    Immature Grans, Absolute 0.01 0.00 - 0.05 10*3/mm3    nRBC 0.0 0.0 - 0.2 /100 WBC   Ethanol    Collection Time: 08/28/24  8:01 PM    Specimen: Blood   Result Value Ref Range    Ethanol 238 (H) 0 - 10 mg/dL    Ethanol % 0.238 %   ECG 12 Lead ED Triage Standing Order; SOA    Collection Time: 08/28/24  8:04 PM   Result Value Ref Range    QT Interval 337 ms    QTC Interval 439 ms   Comprehensive Metabolic Panel    Collection Time: 08/28/24  9:05 PM    Specimen: Blood   Result Value Ref Range    Glucose 183 (H) 65 - 99 mg/dL    BUN 14 6 - 20 mg/dL     Creatinine 0.75 (L) 0.76 - 1.27 mg/dL    Sodium 143 136 - 145 mmol/L    Potassium 3.7 3.5 - 5.2 mmol/L    Chloride 104 98 - 107 mmol/L    CO2 24.0 22.0 - 29.0 mmol/L    Calcium 8.5 (L) 8.6 - 10.5 mg/dL    Total Protein 7.1 6.0 - 8.5 g/dL    Albumin 4.5 3.5 - 5.2 g/dL    ALT (SGPT) 118 (H) 1 - 41 U/L    AST (SGOT) 61 (H) 1 - 40 U/L    Alkaline Phosphatase 74 39 - 117 U/L    Total Bilirubin 0.3 0.0 - 1.2 mg/dL    Globulin 2.6 gm/dL    A/G Ratio 1.7 g/dL    BUN/Creatinine Ratio 18.7 7.0 - 25.0    Anion Gap 15.0 5.0 - 15.0 mmol/L    eGFR 112.7 >60.0 mL/min/1.73   Single High Sensitivity Troponin T    Collection Time: 08/28/24  9:05 PM    Specimen: Blood   Result Value Ref Range    HS Troponin T 34 (H) <22 ng/L   Magnesium    Collection Time: 08/28/24  9:05 PM    Specimen: Blood   Result Value Ref Range    Magnesium 2.0 1.6 - 2.6 mg/dL   Single High Sensitivity Troponin T    Collection Time: 08/28/24 10:40 PM    Specimen: Blood   Result Value Ref Range    HS Troponin T 24 (H) <22 ng/L   ECG 12 Lead Chest Pain    Collection Time: 08/29/24  1:56 AM   Result Value Ref Range    QT Interval 354 ms    QTC Interval 438 ms   Ethanol    Collection Time: 08/29/24  3:15 AM    Specimen: Blood   Result Value Ref Range    Ethanol 31 (H) 0 - 10 mg/dL    Ethanol % 0.031 %   POC Glucose Once    Collection Time: 08/29/24  4:43 AM    Specimen: Blood   Result Value Ref Range    Glucose 159 (H) 70 - 130 mg/dL   POC Glucose Once    Collection Time: 08/29/24  5:06 AM    Specimen: Blood   Result Value Ref Range    Glucose 156 (H) 70 - 130 mg/dL       I ordered the above labs and independently reviewed the results.        RADIOLOGY  XR Chest 1 View    Result Date: 8/28/2024  XR CHEST 1 VW-  INDICATION: Shortness of breath  COMPARISON: CT chest angiogram 6/29/2024 and radiographs dating back to 1/27/2015      No focal consolidation. No pleural effusion or pneumothorax.  Normal size cardiomediastinal silhouette.  No focal osseous abnormality.    This report was finalized on 8/28/2024 8:56 PM by Dr. Adal England M.D on Workstation: [email or web address]       I ordered the above noted radiological studies. Reviewed by me and discussed with radiologist.  See dictation for official radiology interpretation.      PROCEDURES    Procedures      MEDICATIONS GIVEN IN ER    Medications   sodium chloride 0.9 % flush 10 mL (has no administration in time range)   sodium chloride 0.9 % flush 10 mL (has no administration in time range)   sodium chloride 0.9 % flush 10 mL (has no administration in time range)   sodium chloride 0.9 % infusion 40 mL (has no administration in time range)   Magnesium Standard Dose Replacement - Follow Nurse / BPA Driven Protocol (has no administration in time range)   dextrose (GLUTOSE) oral gel 15 g (has no administration in time range)   dextrose (D50W) (25 g/50 mL) IV injection 25 g (has no administration in time range)   glucagon (GLUCAGEN) injection 1 mg (has no administration in time range)   insulin lispro (HUMALOG/ADMELOG) injection 2-7 Units (has no administration in time range)   nitroglycerin (NITROSTAT) SL tablet 0.4 mg (has no administration in time range)   sodium chloride 0.9 % infusion (has no administration in time range)   acetaminophen (TYLENOL) tablet 650 mg (has no administration in time range)     Or   acetaminophen (TYLENOL) 160 MG/5ML oral solution 650 mg (has no administration in time range)     Or   acetaminophen (TYLENOL) suppository 650 mg (has no administration in time range)   sennosides-docusate (PERICOLACE) 8.6-50 MG per tablet 2 tablet (has no administration in time range)     And   polyethylene glycol (MIRALAX) packet 17 g (has no administration in time range)     And   bisacodyl (DULCOLAX) EC tablet 5 mg (has no administration in time range)     And   bisacodyl (DULCOLAX) suppository 10 mg (has no administration in time range)   thiamine (B-1) injection 200 mg (has no administration in time range)     Followed by    thiamine (VITAMIN B-1) tablet 100 mg (has no administration in time range)   folic acid (FOLVITE) tablet 1 mg (has no administration in time range)   LORazepam (ATIVAN) tablet 1 mg (has no administration in time range)     Or   LORazepam (ATIVAN) injection 1 mg (has no administration in time range)     Or   LORazepam (ATIVAN) tablet 2 mg (has no administration in time range)     Or   LORazepam (ATIVAN) injection 2 mg (has no administration in time range)     Or   LORazepam (ATIVAN) injection 2 mg (has no administration in time range)     Or   LORazepam (ATIVAN) injection 2 mg (has no administration in time range)   sodium chloride 0.9 % bolus 1,000 mL (0 mL Intravenous Stopped 8/29/24 0152)   LORazepam (ATIVAN) injection 1 mg (1 mg Intravenous Given 8/29/24 6334)         ORDERS PLACED DURING THIS VISIT:  Orders Placed This Encounter   Procedures    Ethanol    Basic Metabolic Panel    CBC Auto Differential    Diet: Diabetic; Consistent Carbohydrate; Fluid Consistency: Thin (IDDSI 0)    Monitor Blood Pressure    Intake & Output    Weigh Patient    Oral Care    Obtain Baseline Clinical [place] Withdrawal Assessment - Ar (CIWA-Ar), Sedation Scale & Vital Signs    Clinical [place] Withdrawal Assessment (CIWA-Ar)    If CIWA-Ar Score Less Than 8 For 3 Consecutive Assessments, Monitor Every 4 Hours & Discontinue Assessment When CIWA-Ar Less Than 8 for 24 Hours    Obtain Pre & Post Sedation Scores With Every Sedative Dose - Hold For POSS Greater Than 2 or RASS of -3 or Less    Notify Provider - Withdrawal    Notify Provider of Abnormal Lab Results    Notify Provider - Vitals    Place Sequential Compression Device    Maintain Sequential Compression Device    Maintain IV Access    Telemetry - Place Orders & Notify Provider of Results When Patient Experiences Acute Chest Pain, Dysrhythmia or Respiratory Distress    May Be Off Telemetry for Tests    Continuous Pulse Oximetry    Up With Assistance    Code Status and Medical  Interventions: CPR (Attempt to Resuscitate); Full Support    [facility] (on-call MD unless specified) Details    Inpatient Access Center Consult    POC Glucose 4x Daily Before Meals & at Bedtime    POC Glucose Once    POC Glucose Once    ECG 12 Lead Chest Pain    Insert Peripheral IV    Insert Peripheral IV    Initiate Observation Status    Seizure Precautions    Safety Precautions         PROGRESS, DATA ANALYSIS, CONSULTS, AND MEDICAL DECISION MAKING    All labs have been independently interpreted by me.  All radiology studies have been reviewed by me and discussed with radiologist dictating the report.   EKG's independently viewed and interpreted by me.  Discussion below represents my analysis of pertinent findings related to patient's condition, differential diagnosis, treatment plan and final disposition.    Differential diagnosis includes but is not limited to alcohol withdrawal, benzo withdrawal, intoxication, malingering.    Clinical Scores:              ED Course as of 08/29/24 0523   Thu Aug 29, 2024   0202 EKG          EKG time: 0156  Rhythm/Rate: Sinus rhythm rate 92  P waves and PA: Normal  QRS, axis: Within normal  ST and T waves: Within normal limits    Interpreted Contemporaneously by me, independently viewed  No significant changed compared to prior EKG from 8/28/2024   [TJ]      ED Course User Index  [TJ] Bryn Urbano MD               PPE: The patient wore a mask and I wore an N95 mask throughout the entire patient encounter.       AS OF 05:23 EDT VITALS:    BP - 153/68  HR - 80  TEMP - 97.4 °F (36.3 °C) (Oral)  O2 SATS - 90%        DIAGNOSIS  Final diagnoses:   Alcohol withdrawal syndrome with complication         DISPOSITION  ED Disposition       ED Disposition   Decision to Admit    Condition   --    Comment   Level of Care: Telemetry [5]   Diagnosis: Alcohol withdrawal [291.81.ICD-9-CM]   Admitting Physician: JAYSHREE RAMSAY [[id number]]                    Note Disclaimer: At [facility], we  believe that sharing information builds trust and better relationships. You are receiving this note because you recently visited [facility]. It is possible you will see health information before a provider has talked with you about it. This kind of information can be easy to misunderstand. To help you fully understand what it means for your health, we urge you to discuss this note with your provider.         Bryn Urbano MD  08/29/24 05

## 2024-08-29 NOTE — LETTER
[facility] CASE MAN  Malorie BERMAN [facility] [id number]  [phone]        September 3, 2024      Patient: Bryant Edmonds  YOB: 1980  Date of Visit: 8/29/2024      SEE FOR INPATIENT:  ID# [id number]    PATIENT WAS OBSERVATION AND CONVERTED TO INPT 08/30/24    [facility] DEPT: [phone], [phone]    [facility]: ALLY [id number] [facility]# [id number]            Sharifa Pacheco RN

## 2024-08-29 NOTE — ED NOTES
"Nursing report ED to floor  Bryant Edmonds  44 y.o.  male    HPI :  HPI (Adult)  Stated Reason for Visit: Pt presents to ED with complaints of not feeling right, like he is about to have a seizure and \"I am in full blown withdrawal\" from alchohol. Pt was seen here earlier tonight for alcohol intoxication, soa, and urinary issues. Pt has been without seizure medicine for 3-4 days. Last drink was approx 6 pm. Drinks 2 liters alcohol per day  History Obtained From: patient    Context: Bryant Edmonds is a 44 y.o. male who presents to the ED c/o acute alcohol withdrawal.  Patient was recently in the ED with alcohol intoxication.  States that while waiting for his ride he became tremulous and began to experience withdrawal symptoms.  Patient reports having intermittent cold sweats and tremor.  Patient with multiple prior admissions with alcohol withdrawal.  Also with history of benzo use.  Patient states that he drinks 2 L of vodka daily.  States his last drink was around 6 PM yesterday.     Chief Complaint  Chief Complaint   Patient presents with    Alcohol Problem       Admitting doctor:   Glen Lund MD    Admitting diagnosis:   The encounter diagnosis was Alcohol withdrawal syndrome with complication.    Code status:   Current Code Status       Date Active Code Status Order ID Comments User Context       Prior            Allergies:   Patient has no known allergies.    Isolation:   No active isolations    Intake and Output  No intake or output data in the 24 hours ending 08/29/24 0422    Weight:       08/29/24  0151   Weight: 94.8 kg (209 lb)       Most recent vitals:   Vitals:    08/29/24 0151 08/29/24 0158 08/29/24 0301 08/29/24 0303   BP:  175/88 171/91    Patient Position:  Sitting     Pulse: 95   88   Resp: 20      Temp: 97.4 °F (36.3 °C)      TempSrc: Tympanic      SpO2: 98%   97%   Weight: 94.8 kg (209 lb)      Height: 177.8 cm (70\")          Active LDAs/IV Access:   Lines, Drains & Airways       Active " LDAs       Name Placement date Placement time Site Days    Peripheral IV 08/29/24 0315 Left;Posterior Hand 08/29/24 0315  Hand  less than 1                    Labs (abnormal labs have a star):   Labs Reviewed   ETHANOL - Abnormal; Notable for the following components:       Result Value    Ethanol 31 (*)     All other components within normal limits       EKG:   ECG 12 Lead Chest Pain   Preliminary Result   HEART RATE=92  bpm   RR Lehriuod=967  ms   TN Qzukcoun=257  ms   P Horizontal Axis=-30  deg   P Front Axis=-27  deg   QRSD Interval=87  ms   QT Cwhnvlcc=667  ms   WAdW=259  ms   QRS Axis=4  deg   T Wave Axis=-3  deg   - BORDERLINE ECG -   Sinus rhythm   Borderline T abnormalities, inferior leads   Date and Time of Study:2024-08-29 01:56:30          Meds given in ED:   Medications   sodium chloride 0.9 % flush 10 mL (has no administration in time range)   sodium chloride 0.9 % bolus 1,000 mL (1,000 mL Intravenous New Bag 8/29/24 0316)   LORazepam (ATIVAN) injection 1 mg (1 mg Intravenous Given 8/29/24 0354)       Imaging results:  XR Chest 1 View    Result Date: 8/28/2024  No focal consolidation. No pleural effusion or pneumothorax.  Normal size cardiomediastinal silhouette.  No focal osseous abnormality.   This report was finalized on 8/28/2024 8:56 PM by Dr. Adal England M.D on Workstation: [id number]       Ambulatory status:   - with assist    Social issues:   Social History     Socioeconomic History    Marital status: Single   Tobacco Use    Smoking status: Never    Smokeless tobacco: Current     Types: Snuff   Vaping Use    Vaping status: Never Used   Substance and Sexual Activity    Alcohol use: Yes     Comment: 1 liter vodka; admites to intermittent binging    Drug use: Defer    Sexual activity: Defer       Peripheral Neurovascular  Peripheral Neurovascular (Adult)  Peripheral Neurovascular WDL: WDL    Neuro Cognitive  Neuro Cognitive (Adult)  Cognitive/Neuro/Behavioral WDL: .WDL except, orientation  (states he is starting to hallucinate)  Orientation: oriented x 4  Additional Documentation: Headache Assessment (Group)  Headache Assessment  Headache Location: (S) frontal  Description/Character: (S) pressure  Associated Signs/Symptoms: (S) nausea    Learning  Learning Assessment (Adult)  Learning Readiness and Ability: no barriers identified  Education Provided  Person Taught: patient    Respiratory  Respiratory WDL  Respiratory WDL: WDL    Abdominal Pain  Safety Interventions  Safety Precautions/Falls Reduction: family at bedside    Pain Assessments  Pain (Adult)  (0-10) Pain Rating: Rest: 5  Pain Location: chest  Pain Side/Orientation: medial    NIH Stroke Scale       Gume Martini RN  08/29/24 04:22 EDT

## 2024-08-29 NOTE — H&P
"    Patient Name:  Bryant Edmonds  YOB: 1980  MRN:  [id number]  Admit Date:  8/29/2024  Patient Care Team:  Provider, No Known as PCP - General  Provider, No Known      Subjective   History Present Illness     Chief Complaint   Patient presents with    Alcohol Problem     History of Present Illness  Mr. Edmonds is a 44-year-old male with history of MI, PTSD, TBI, seizures, alcohol abuse who presents to the emergency room with alcohol intoxication.  Patient states that he is here for alcohol detox, he states in the past he has had withdrawal seizures as well as \" kidney failure\".  He states his last drink was last night around 6 PM.  He drinks about a 2 L of vodka a day.  He was actually seen in the emergency room and was planning to discharge with some outpatient therapy information but while he was waiting on his ride he developed some tremors and cold sweats which she has experienced before with alcohol withdrawal.  Been admitted to other hospitals with alcohol withdrawal in the past, he has also been admitted here to [facility] for alcohol withdrawal.  Patient states he actually lives in [place] but is here working.  In the emergency room his troponin was 34 with a repeat of 24, sodium 143, creatinine 0.75, BUN 14, glucose 183, white blood cell count 5.7, hemoglobin 17.2, hematocrit 50.5, platelets 247, initial alcohol level was 238.  Chest x-ray shows no focal consolidation.  EKG shows sinus tachycardia with a rate of 102.    Review of Systems   Constitutional:  Positive for diaphoresis and fatigue. Negative for appetite change and fever.   HENT:  Negative for nosebleeds and trouble swallowing.    Eyes:  Negative for photophobia, redness and visual disturbance.   Respiratory:  Negative for cough, chest tightness, shortness of breath and wheezing.    Cardiovascular:  Negative for chest pain, palpitations and leg swelling.   Gastrointestinal:  Negative for abdominal distention, abdominal " pain, nausea and vomiting.   Endocrine: Negative.    Genitourinary: Negative.    Musculoskeletal:  Negative for gait problem and joint swelling.   Skin: Negative.    Neurological:  Positive for tremors. Negative for dizziness, seizures, speech difficulty, light-headedness and headaches.   Hematological: Negative.    Psychiatric/Behavioral:  Negative for behavioral problems and confusion.         Personal History     Past Medical History:   Diagnosis Date    Acute renal failure (ARF)     Hearing loss     Myocardial infarction     Night terror     PTSD (post-traumatic stress disorder)     Seizures      Past Surgical History:   Procedure Laterality Date    CORONARY ANGIOPLASTY WITH STENT PLACEMENT      FRACTURE SURGERY       No family history on file.  Social History     Tobacco Use    Smoking status: Never    Smokeless tobacco: Current     Types: Snuff   Vaping Use    Vaping status: Never Used   Substance Use Topics    Alcohol use: Yes     Comment: 1 liter vodka; admites to intermittent binging    Drug use: Defer     Current Facility-Administered Medications on File Prior to Encounter   Medication Dose Route Frequency Provider Last Rate Last Admin    [COMPLETED] folic acid 1 mg in sodium chloride 0.9 % 50 mL IVPB  1 mg Intravenous Once Yamel Rodriguez PA-C   Stopped at 08/28/24 2216    [COMPLETED] ibuprofen (ADVIL,MOTRIN) tablet 600 mg  600 mg Oral Once Yamel Rodriguez PA-C   600 mg at 08/29/24 0005    [COMPLETED] lactated ringers bolus 1,000 mL  1,000 mL Intravenous Once Yamel Rodriguez PA-C   Stopped at 08/29/24 0009    [COMPLETED] thiamine (B-1) injection 100 mg  100 mg Intravenous Once Yamel Rodriguez PA-C   100 mg at 08/28/24 2127    [DISCONTINUED] sodium chloride 0.9 % flush 10 mL  10 mL Intravenous PRN Jay Last MD         Current Outpatient Medications on File Prior to Encounter   Medication Sig Dispense Refill    ALPRAZolam (XANAX) 1 MG tablet       sertraline (ZOLOFT) 50 MG tablet 1 tablet.       aspirin 81 MG chewable tablet Chew 1 tablet Daily.      atorvastatin (LIPITOR) 20 MG tablet Take 1 tablet by mouth Every Night. 90 tablet 0    cloNIDine (CATAPRES) 0.1 MG tablet Take 1 tablet by mouth 2 (Two) Times a Day As Needed for High Blood Pressure (SBP Greater Than 180). 60 tablet 0    folic acid (FOLVITE) 1 MG tablet Take 1 tablet by mouth Daily.      HydrOXYzine Pamoate (VISTARIL PO) Take  by mouth.      losartan (COZAAR) 50 MG tablet Take 1 tablet by mouth Daily. 90 tablet 0    multivitamin with minerals tablet tablet Take 1 tablet by mouth Daily.      nebivolol (BYSTOLIC) 10 MG tablet Take 1 tablet by mouth Daily. 7 tablet 0    phenytoin ER (DILANTIN) 100 MG capsule Take 1 capsule by mouth Every 6 (Six) Hours for 30 days. 120 capsule 0    thiamine (VITAMIN B1) 100 MG tablet Take 1 tablet by mouth Daily.      UNKNOWN TO PATIENT Cholesterol meds       No Known Allergies    Objective    Objective     Vital Signs  Temp:  [97.4 °F (36.3 °C)-97.7 °F (36.5 °C)] 97.4 °F (36.3 °C)  Heart Rate:  [79-95] 80  Resp:  [20] 20  BP: (164-175)/() 164/104  SpO2:  [90 %-98 %] 90 %  on   ;   Device (Oxygen Therapy): room air  Body mass index is 29.99 kg/m².    Physical Exam  Vitals and nursing note reviewed.   Constitutional:       General: He is not in acute distress.     Appearance: He is well-developed.   HENT:      Head: Normocephalic.   Neck:      Vascular: No JVD.   Cardiovascular:      Rate and Rhythm: Normal rate and regular rhythm.      Comments: Normal sinus rhythm on the monitor with heart rate 96 during my exam.  He denies any chest pain, no peripheral edema.  Pulmonary:      Effort: Pulmonary effort is normal.      Breath sounds: Normal breath sounds.      Comments: Lungs lungs clear, sats 98% room air  Abdominal:      General: Bowel sounds are normal. There is no distension.      Palpations: Abdomen is soft.      Tenderness: There is no abdominal tenderness.   Musculoskeletal:         General: Normal  range of motion.      Cervical back: Normal range of motion.   Skin:     General: Skin is warm and dry.      Capillary Refill: Capillary refill takes less than 2 seconds.   Neurological:      General: No focal deficit present.      Mental Status: He is alert and oriented to person, place, and time.      Comments: Patient does have some occasional tremor, but answers question appropriately, follows commands, repetitive in  answers.   Psychiatric:         Behavior: Behavior normal.         Results Review:  I reviewed the patient's new clinical results.  I reviewed the patient's new imaging results and agree with the interpretation.  I reviewed the patient's other test results and agree with the interpretation  I personally viewed and interpreted the patient's EKG/Telemetry data  Discussed with ED provider.    Lab Results (last 24 hours)       Procedure Component Value Units Date/Time    CBC & Differential [[id number]]  (Abnormal) Collected: 08/28/24 2001    Specimen: Blood Updated: 08/28/24 2016    Narrative:      The following orders were created for panel order CBC & Differential.  Procedure                               Abnormality         Status                     ---------                               -----------         ------                     CBC Auto Differential[[id number]]        Abnormal            Final result                 Please view results for these tests on the individual orders.    BNP [[id number]]  (Normal) Collected: 08/28/24 2001    Specimen: Blood Updated: 08/28/24 2038     proBNP <36.0 pg/mL     Narrative:      This assay is used as an aid in the diagnosis of individuals suspected of having heart failure. It can be used as an aid in the diagnosis of acute decompensated heart failure (ADHF) in patients presenting with signs and symptoms of ADHF to the emergency department (ED). In addition, NT-proBNP of <300 pg/mL indicates ADHF is not likely.    Age Range Result Interpretation  NT-proBNP  Concentration (pg/mL:      <50             Positive            >450                   Gray                 300-450                    Negative             <300    50-75           Positive            >900                  Gray                300-900                  Negative            <300      >75             Positive            >1800                  Gray                300-1800                  Negative            <300    CBC Auto Differential [[id number]]  (Abnormal) Collected: 08/28/24 2001    Specimen: Blood Updated: 08/28/24 2016     WBC 5.73 10*3/mm3      RBC 5.36 10*6/mm3      Hemoglobin 17.2 g/dL      Hematocrit 50.5 %      MCV 94.2 fL      MCH 32.1 pg      MCHC 34.1 g/dL      RDW 13.4 %      RDW-SD 46.3 fl      MPV 10.8 fL      Platelets 247 10*3/mm3      Neutrophil % 60.5 %      Lymphocyte % 31.1 %      Monocyte % 4.7 %      Eosinophil % 2.1 %      Basophil % 1.4 %      Immature Grans % 0.2 %      Neutrophils, Absolute 3.47 10*3/mm3      Lymphocytes, Absolute 1.78 10*3/mm3      Monocytes, Absolute 0.27 10*3/mm3      Eosinophils, Absolute 0.12 10*3/mm3      Basophils, Absolute 0.08 10*3/mm3      Immature Grans, Absolute 0.01 10*3/mm3      nRBC 0.0 /100 WBC     Ethanol [[id number]]  (Abnormal) Collected: 08/28/24 2001    Specimen: Blood Updated: 08/28/24 2038     Ethanol 238 mg/dL      Ethanol % 0.238 %     Comprehensive Metabolic Panel [[id number]]  (Abnormal) Collected: 08/28/24 2105    Specimen: Blood Updated: 08/28/24 2136     Glucose 183 mg/dL      BUN 14 mg/dL      Creatinine 0.75 mg/dL      Sodium 143 mmol/L      Potassium 3.7 mmol/L      Chloride 104 mmol/L      CO2 24.0 mmol/L      Calcium 8.5 mg/dL      Total Protein 7.1 g/dL      Albumin 4.5 g/dL      ALT (SGPT) 118 U/L      AST (SGOT) 61 U/L      Alkaline Phosphatase 74 U/L      Total Bilirubin 0.3 mg/dL      Globulin 2.6 gm/dL      A/G Ratio 1.7 g/dL      BUN/Creatinine Ratio 18.7     Anion Gap 15.0 mmol/L      eGFR 112.7 mL/min/1.73      Narrative:      GFR Normal >60  Chronic Kidney Disease <60  Kidney Failure <15      Single High Sensitivity Troponin T [[id number]]  (Abnormal) Collected: 08/28/24 2105    Specimen: Blood Updated: 08/28/24 2136     HS Troponin T 34 ng/L     Narrative:      High Sensitive Troponin T Reference Range:  <14.0 ng/L- Negative Female for AMI  <22.0 ng/L- Negative Male for AMI  >=14 - Abnormal Female indicating possible myocardial injury.  >=22 - Abnormal Male indicating possible myocardial injury.   Clinicians would have to utilize clinical acumen, EKG, Troponin, and serial changes to determine if it is an Acute Myocardial Infarction or myocardial injury due to an underlying chronic condition.         Magnesium [[id number]]  (Normal) Collected: 08/28/24 2105    Specimen: Blood Updated: 08/28/24 2136     Magnesium 2.0 mg/dL     Single High Sensitivity Troponin T [[id number]]  (Abnormal) Collected: 08/28/24 2240    Specimen: Blood Updated: 08/28/24 2322     HS Troponin T 24 ng/L     Narrative:      High Sensitive Troponin T Reference Range:  <14.0 ng/L- Negative Female for AMI  <22.0 ng/L- Negative Male for AMI  >=14 - Abnormal Female indicating possible myocardial injury.  >=22 - Abnormal Male indicating possible myocardial injury.   Clinicians would have to utilize clinical acumen, EKG, Troponin, and serial changes to determine if it is an Acute Myocardial Infarction or myocardial injury due to an underlying chronic condition.         Ethanol [[id number]]  (Abnormal) Collected: 08/29/24 0315    Specimen: Blood Updated: 08/29/24 0345     Ethanol 31 mg/dL      Ethanol % 0.031 %             Imaging Results (Last 24 Hours)       ** No results found for the last 24 hours. **            Results for orders placed during the hospital encounter of 06/29/24    Adult Transthoracic Echo Complete With Contrast if Necessary Per Protocol    Interpretation Summary    Left ventricular ejection fraction appears to be 51 - 55%.    The right  ventricular cavity is borderline dilated.    Estimated right ventricular systolic pressure from tricuspid regurgitation is normal (<35 mmHg).      ECG 12 Lead Chest Pain   Preliminary Result   HEART RATE=92  bpm   RR Wadntgpl=994  ms   IL Corynmpi=338  ms   P Horizontal Axis=-30  deg   P Front Axis=-27  deg   QRSD Interval=87  ms   QT Qmcrcvhk=360  ms   XCbU=672  ms   QRS Axis=4  deg   T Wave Axis=-3  deg   - BORDERLINE ECG -   Sinus rhythm   Borderline T abnormalities, inferior leads   Date and Time of Study:2024-08-29 01:56:30           Assessment/Plan     Active Hospital Problems    Diagnosis  POA    **Alcohol withdrawal [F10.939]  Yes    ROSALINO (generalized anxiety disorder) [F41.1]  Yes    PTSD (post-traumatic stress disorder) [F43.10]  Yes    TBI (traumatic brain injury) [S06.9XAA]  Yes    Primary hypertension [I10]  Yes    Coronary artery disease involving native coronary artery with unstable angina pectoris [I25.110]  Yes    Type 2 diabetes mellitus with hyperglycemia, with long-term current use of insulin [E11.65, Z79.4]  Not Applicable    Seizure disorder [G40.909]  Yes     Mr. Edmonds is a 44-year-old male with history of MI, PTSD, TBI, seizures, alcohol abuse who presents to the emergency room with alcohol intoxication.    Alcohol withdrawal  -Initiate CIWA protocol  -Started on thiamine and multivitamin  -Telemetry unit for monitoring   -neurochecks every 4 hours  -Consult access center  -Repeat CBC, BMP in a.m.  -Seizure precautions    Hypertension/CAD  -Chronic conditions  -Continue home medications when med rec complete    Type 2 diabetes  -Accu-Cheks before meals and at bedtime with correctional dose insulin  -Hold oral diabetic medications    I discussed the patient's findings and my recommendations with patient.    VTE Prophylaxis - SCDs.  Code Status - Full code.       PAULO Schwartz  [facility]  08/29/24  04:35 EDT

## 2024-08-29 NOTE — DISCHARGE INSTRUCTIONS
Please follow-up with your PCP.    Although you are being discharged in the ED today, I encouraged her to return for worsening symptoms.  Things can, and do, change such a treatment at home with medication may not be adequate.  Specifically I recommend returning for chest pain or discomfort, difficulty breathing, persistent vomiting or difficulty holding down liquids or medications, fever > 102.0 F,  or any other worsening or alarming symptoms.     You have been evaluated in the emergency department for your presenting symptoms and deemed appropriate for discharge home.  Understand that your health care does not end here today.  It is important that your primary care physician be made aware of your visit.  I recommend calling your primary care provider in the next 48 hours to arrange follow-up care.  Your primary care provider needs to review your treatment and recovery to ensure that no further treatment is necessary.  Additional testing or procedures may be necessary as an outpatient at the discretion of your primary care provider.    I also recommend following up with your PCP for recheck of your blood pressure and treatment as needed.

## 2024-08-29 NOTE — ED PROVIDER NOTES
" EMERGENCY DEPARTMENT MD ATTESTATION NOTE    Room Number:  35/35  PCP: Provider, No Known  Independent Historians: Patient    HPI:  A complete HPI/ROS/PMH/PSH/SH/FH are unobtainable due to: Poor historian    Chronic or social conditions impacting patient care (Social Determinants of Health): Economic Stability (Employment, Income, Expenses, Debt, Medical Bills, Financial Resource Strain)      Context: Bryant Edmonds is a 44 y.o. male with a medical history of PTSD, seizures, diabetes, alcoholism and MI who presents to the ED c/o acute abdominal distention and discomfort, diarrhea, shortness of breath and difficulties urinating.  Patient says that he \"started drinking again\" and it is also worried about alcohol withdrawal symptoms.  He claims his last drink was approximately 6 PM yesterday.      Review of prior external notes (non-ED) -and- Review of prior external test results outside of this encounter: I independently reviewed the hospital discharge summary from July 18, 2024 when patient was admitted for alcohol withdrawal symptoms along with left flank pain and atypical chest pain and shortness of breath symptoms.      PHYSICAL EXAM    I have reviewed the triage vital signs and nursing notes.    ED Triage Vitals   Temp Heart Rate Resp BP SpO2   08/28/24 1945 08/28/24 1945 08/28/24 1945 08/28/24 1952 08/28/24 1945   97.7 °F (36.5 °C) 79 20 (!) 173/103 96 %      Temp src Heart Rate Source Patient Position BP Location FiO2 (%)   08/28/24 1945 08/28/24 1945 08/28/24 1952 08/28/24 1952 --   Tympanic Monitor Lying Right arm        Physical Exam  GENERAL: alert, no acute distress, intoxicated  SKIN: Warm, dry, no rashes  HENT: Normocephalic, atraumatic  EYES: no scleral icterus, EOMI  CV: regular rhythm, regular rate  RESPIRATORY: normal effort, lungs clear  ABDOMEN: soft, nontender, nondistended  MUSCULOSKELETAL: no deformity  NEURO: alert, moves all extremities, follows commands, no tremors during my evaluation with " him        MEDICATIONS GIVEN IN ER  Medications   sodium chloride 0.9 % flush 10 mL (has no administration in time range)   ibuprofen (ADVIL,MOTRIN) tablet 600 mg (has no administration in time range)   lactated ringers bolus 1,000 mL (1,000 mL Intravenous New Bag 8/28/24 2106)   thiamine (B-1) injection 100 mg (100 mg Intravenous Given 8/28/24 2127)   folic acid 1 mg in sodium chloride 0.9 % 50 mL IVPB (0 mg Intravenous Stopped 8/28/24 2216)         ORDERS PLACED DURING THIS VISIT:  Orders Placed This Encounter   Procedures    XR Chest 1 View    Williams Draw    Comprehensive Metabolic Panel    BNP    Single High Sensitivity Troponin T    CBC Auto Differential    Ethanol    Magnesium    Single High Sensitivity Troponin T    NPO Diet NPO Type: Strict NPO    Undress & Gown    Continuous Pulse Oximetry    Vital Signs    Oxygen Therapy- Nasal Cannula; Titrate 1-6 LPM Per SpO2; 90 - 95%    ECG 12 Lead ED Triage Standing Order; SOA    Insert Peripheral IV    CBC & Differential    Green Top (Gel)    Lavender Top    Gold Top - SST    Light Blue Top         PROCEDURES  Procedures          PROGRESS, DATA ANALYSIS, CONSULTS, AND MEDICAL DECISION MAKING  All labs have been independently interpreted by me.  All radiology studies have been reviewed by me. All EKG's have been independently viewed and interpreted by me.  Discussion below represents my analysis of pertinent findings related to patient's condition, differential diagnosis, treatment plan and final disposition.    Differential diagnosis includes but is not limited to alcohol withdrawal symptoms, infectious diarrhea,.  Dehydration, UTI, electrolyte abnormality, BPH    Clinical Scores:                   ED Course as of 08/28/24 2330   Wed Aug 28, 2024   2028 WBC: 5.73 [CC]   2041 Ethanol(!): 238 [CC]   2043 I discussed the case with Dr. Last and he agrees to evaluate the patient at the bedside.    [CC]   2305 ALT (SGPT)(!): 118 [CC]   2305 AST (SGOT)(!): 61 [CC]   2322  "HS Troponin T(!): 24  Delta -10 [CC]   2322 Patient has been observed for an extended period of time here in the emergency department and is now ambulatory, tolerating p.o., and speaking in clear sentences.  Patient is clinically sober.   [CC]      ED Course User Index  [CC] Yamel Rodriguez, BRANDON       MDM:   I independently interpreted the Chest X-ray and my findings are: No Pneumothorax, No Effusion, No Infiltrate     I reviewed the labs from today's ED visit.  Alcohol intoxication confirmed with EtOH level of 238.  No evidence of acute metabolic derangement which requires emergent correction.  Otherwise physical exam is reassuring.  Patient stable for discharge and outpatient resources for alcohol recovery.  Will discuss with him the usual \"return to ER\" instructions prior to discharge.    COMPLEXITY OF CARE  Admission was considered but after careful review of the patient's presentation, physical examination, diagnostic results, and response to treatment the patient may be safely discharged with outpatient follow-up.    Please note that portions of this document were completed with a voice recognition program.    Note Disclaimer: At [facility], we believe that sharing information builds trust and better relationships. You are receiving this note because you recently visited [facility]. It is possible you will see health information before a provider has talked with you about it. This kind of information can be easy to misunderstand. To help you fully understand what it means for your health, we urge you to discuss this note with your provider.         Jay Last MD  08/31/24 0722    "

## 2024-08-30 LAB
ALBUMIN SERPL-MCNC: 3.9 G/DL (ref 3.5–5.2)
ALBUMIN/GLOB SERPL: 1.6 G/DL
ALP SERPL-CCNC: 60 U/L (ref 39–117)
ALT SERPL W P-5'-P-CCNC: 81 U/L (ref 1–41)
ANION GAP SERPL CALCULATED.3IONS-SCNC: 11.8 MMOL/L (ref 5–15)
AST SERPL-CCNC: 48 U/L (ref 1–40)
BILIRUB SERPL-MCNC: 0.7 MG/DL (ref 0–1.2)
BILIRUB UR QL STRIP: NEGATIVE
BUN SERPL-MCNC: 9 MG/DL (ref 6–20)
BUN/CREAT SERPL: 13.4 (ref 7–25)
CALCIUM SPEC-SCNC: 8.5 MG/DL (ref 8.6–10.5)
CHLORIDE SERPL-SCNC: 103 MMOL/L (ref 98–107)
CLARITY UR: CLEAR
CO2 SERPL-SCNC: 21.2 MMOL/L (ref 22–29)
COLOR UR: YELLOW
CREAT SERPL-MCNC: 0.67 MG/DL (ref 0.76–1.27)
DEPRECATED RDW RBC AUTO: 43.5 FL (ref 37–54)
EGFRCR SERPLBLD CKD-EPI 2021: 116.6 ML/MIN/1.73
ERYTHROCYTE [DISTWIDTH] IN BLOOD BY AUTOMATED COUNT: 12.9 % (ref 12.3–15.4)
GLOBULIN UR ELPH-MCNC: 2.5 GM/DL
GLUCOSE BLDC GLUCOMTR-MCNC: 141 MG/DL (ref 70–130)
GLUCOSE BLDC GLUCOMTR-MCNC: 149 MG/DL (ref 70–130)
GLUCOSE BLDC GLUCOMTR-MCNC: 200 MG/DL (ref 70–130)
GLUCOSE BLDC GLUCOMTR-MCNC: 201 MG/DL (ref 70–130)
GLUCOSE SERPL-MCNC: 182 MG/DL (ref 65–99)
GLUCOSE UR STRIP-MCNC: ABNORMAL MG/DL
HCT VFR BLD AUTO: 44.8 % (ref 37.5–51)
HGB BLD-MCNC: 15.4 G/DL (ref 13–17.7)
HGB UR QL STRIP.AUTO: NEGATIVE
KETONES UR QL STRIP: NEGATIVE
LEUKOCYTE ESTERASE UR QL STRIP.AUTO: NEGATIVE
MCH RBC QN AUTO: 31.8 PG (ref 26.6–33)
MCHC RBC AUTO-ENTMCNC: 34.4 G/DL (ref 31.5–35.7)
MCV RBC AUTO: 92.6 FL (ref 79–97)
NITRITE UR QL STRIP: NEGATIVE
PH UR STRIP.AUTO: 7 [PH] (ref 5–8)
PLATELET # BLD AUTO: 161 10*3/MM3 (ref 140–450)
PMV BLD AUTO: 11.3 FL (ref 6–12)
POTASSIUM SERPL-SCNC: 3.6 MMOL/L (ref 3.5–5.2)
PROT SERPL-MCNC: 6.4 G/DL (ref 6–8.5)
PROT UR QL STRIP: NEGATIVE
RBC # BLD AUTO: 4.84 10*6/MM3 (ref 4.14–5.8)
SODIUM SERPL-SCNC: 136 MMOL/L (ref 136–145)
SP GR UR STRIP: 1.03 (ref 1–1.03)
UROBILINOGEN UR QL STRIP: ABNORMAL
WBC NRBC COR # BLD AUTO: 3.81 10*3/MM3 (ref 3.4–10.8)

## 2024-08-30 PROCEDURE — 80053 COMPREHEN METABOLIC PANEL: CPT | Performed by: STUDENT IN AN ORGANIZED HEALTH CARE EDUCATION/TRAINING PROGRAM

## 2024-08-30 PROCEDURE — 82948 REAGENT STRIP/BLOOD GLUCOSE: CPT

## 2024-08-30 PROCEDURE — 25810000003 SODIUM CHLORIDE 0.9 % SOLUTION: Performed by: NURSE PRACTITIONER

## 2024-08-30 PROCEDURE — 25010000002 PHENOBARBITAL PER 120 MG: Performed by: STUDENT IN AN ORGANIZED HEALTH CARE EDUCATION/TRAINING PROGRAM

## 2024-08-30 PROCEDURE — 85027 COMPLETE CBC AUTOMATED: CPT | Performed by: STUDENT IN AN ORGANIZED HEALTH CARE EDUCATION/TRAINING PROGRAM

## 2024-08-30 PROCEDURE — 25010000002 THIAMINE PER 100 MG: Performed by: NURSE PRACTITIONER

## 2024-08-30 PROCEDURE — 81003 URINALYSIS AUTO W/O SCOPE: CPT | Performed by: STUDENT IN AN ORGANIZED HEALTH CARE EDUCATION/TRAINING PROGRAM

## 2024-08-30 PROCEDURE — 63710000001 INSULIN LISPRO (HUMAN) PER 5 UNITS: Performed by: NURSE PRACTITIONER

## 2024-08-30 PROCEDURE — 25010000002 ENOXAPARIN PER 10 MG: Performed by: STUDENT IN AN ORGANIZED HEALTH CARE EDUCATION/TRAINING PROGRAM

## 2024-08-30 RX ORDER — ENOXAPARIN SODIUM 100 MG/ML
40 INJECTION SUBCUTANEOUS EVERY 24 HOURS
Status: DISCONTINUED | OUTPATIENT
Start: 2024-08-30 | End: 2024-09-04 | Stop reason: HOSPADM

## 2024-08-30 RX ADMIN — THIAMINE HYDROCHLORIDE 200 MG: 100 INJECTION, SOLUTION INTRAMUSCULAR; INTRAVENOUS at 06:30

## 2024-08-30 RX ADMIN — PHENYTOIN SODIUM 100 MG: 100 CAPSULE ORAL at 11:30

## 2024-08-30 RX ADMIN — SERTRALINE 50 MG: 50 TABLET, FILM COATED ORAL at 08:10

## 2024-08-30 RX ADMIN — ASPIRIN 81 MG: 81 TABLET, CHEWABLE ORAL at 08:10

## 2024-08-30 RX ADMIN — THIAMINE HYDROCHLORIDE 200 MG: 100 INJECTION, SOLUTION INTRAMUSCULAR; INTRAVENOUS at 21:22

## 2024-08-30 RX ADMIN — LORAZEPAM 1 MG: 1 TABLET ORAL at 16:53

## 2024-08-30 RX ADMIN — LORAZEPAM 1 MG: 1 TABLET ORAL at 21:22

## 2024-08-30 RX ADMIN — ENOXAPARIN SODIUM 40 MG: 100 INJECTION SUBCUTANEOUS at 13:42

## 2024-08-30 RX ADMIN — PHENOBARBITAL SODIUM 65 MG: 65 INJECTION, SOLUTION INTRAMUSCULAR; INTRAVENOUS at 16:54

## 2024-08-30 RX ADMIN — LORAZEPAM 1 MG: 1 TABLET ORAL at 08:10

## 2024-08-30 RX ADMIN — LORAZEPAM 1 MG: 1 TABLET ORAL at 04:19

## 2024-08-30 RX ADMIN — LORAZEPAM 1 MG: 1 TABLET ORAL at 00:18

## 2024-08-30 RX ADMIN — ACETAMINOPHEN 325MG 650 MG: 325 TABLET ORAL at 22:50

## 2024-08-30 RX ADMIN — PHENOBARBITAL SODIUM 65 MG: 65 INJECTION, SOLUTION INTRAMUSCULAR; INTRAVENOUS at 04:13

## 2024-08-30 RX ADMIN — SODIUM CHLORIDE 100 ML/HR: 9 INJECTION, SOLUTION INTRAVENOUS at 13:39

## 2024-08-30 RX ADMIN — PHENYTOIN SODIUM 100 MG: 100 CAPSULE ORAL at 16:55

## 2024-08-30 RX ADMIN — LORAZEPAM 1 MG: 1 TABLET ORAL at 23:56

## 2024-08-30 RX ADMIN — Medication 10 ML: at 08:10

## 2024-08-30 RX ADMIN — NITROGLYCERIN 0.4 MG: 0.4 TABLET SUBLINGUAL at 22:50

## 2024-08-30 RX ADMIN — INSULIN LISPRO 3 UNITS: 100 INJECTION, SOLUTION INTRAVENOUS; SUBCUTANEOUS at 21:23

## 2024-08-30 RX ADMIN — THIAMINE HYDROCHLORIDE 200 MG: 100 INJECTION, SOLUTION INTRAMUSCULAR; INTRAVENOUS at 13:42

## 2024-08-30 RX ADMIN — FOLIC ACID 1 MG: 1 TABLET ORAL at 08:10

## 2024-08-30 RX ADMIN — PHENYTOIN SODIUM 100 MG: 100 CAPSULE ORAL at 00:13

## 2024-08-30 RX ADMIN — PHENYTOIN SODIUM 100 MG: 100 CAPSULE ORAL at 23:56

## 2024-08-30 RX ADMIN — SODIUM CHLORIDE 100 ML/HR: 9 INJECTION, SOLUTION INTRAVENOUS at 04:12

## 2024-08-30 RX ADMIN — PHENYTOIN SODIUM 100 MG: 100 CAPSULE ORAL at 06:30

## 2024-08-30 RX ADMIN — INSULIN LISPRO 3 UNITS: 100 INJECTION, SOLUTION INTRAVENOUS; SUBCUTANEOUS at 08:10

## 2024-08-30 RX ADMIN — LOSARTAN POTASSIUM 50 MG: 50 TABLET, FILM COATED ORAL at 08:10

## 2024-08-30 RX ADMIN — NEBIVOLOL 10 MG: 10 TABLET ORAL at 08:10

## 2024-08-30 RX ADMIN — LORAZEPAM 1 MG: 1 TABLET ORAL at 11:30

## 2024-08-30 RX ADMIN — Medication 10 ML: at 21:24

## 2024-08-30 NOTE — PROGRESS NOTES
"    Name: Bryant Edmonds ADMIT: 2024   : 1980  PCP: Provider, No Known    MRN: [id number] LOS: 0 days   AGE/SEX: 44 y.o. male  ROOM: [facility]     Subjective     Examined at bedside.  Complains of \"kidney pain\"      Objective   Objective   Vital Signs  Temp:  [98.1 °F (36.7 °C)-98.5 °F (36.9 °C)] 98.1 °F (36.7 °C)  Heart Rate:  [74-77] 76  Resp:  [18-20] 18  BP: (141-153)/(75-87) 153/78  SpO2:  [93 %-100 %] 100 %  on   ;   Device (Oxygen Therapy): room air  Body mass index is 28.06 kg/m².  Physical Exam  Constitutional:       General: He is not in acute distress.  HENT:      Head: Normocephalic and atraumatic.   Cardiovascular:      Rate and Rhythm: Normal rate and regular rhythm.   Pulmonary:      Effort: Pulmonary effort is normal. No respiratory distress.   Abdominal:      General: There is no distension.      Palpations: Abdomen is soft.      Tenderness: There is no abdominal tenderness.   Neurological:      General: No focal deficit present.      Mental Status: He is alert and oriented to person, place, and time.         Results Review     I reviewed the patient's new clinical results.  Results from last 7 days   Lab Units 24  04524  0524   WBC 10*3/mm3 3.81 4.62 5.73   HEMOGLOBIN g/dL 15.4 15.1 17.2   PLATELETS 10*3/mm3 161 182 247     Results from last 7 days   Lab Units 24  0456 24  0520 245   SODIUM mmol/L 136 138 143   POTASSIUM mmol/L 3.6 3.6 3.7   CHLORIDE mmol/L 103 105 104   CO2 mmol/L 21.2* 21.0* 24.0   BUN mg/dL 9 13 14   CREATININE mg/dL 0.67* 0.63* 0.75*   GLUCOSE mg/dL 182* 183* 183*   Estimated Creatinine Clearance: 154.5 mL/min (A) (by C-G formula based on SCr of 0.67 mg/dL (L)).  Results from last 7 days   Lab Units 24  0456 24  2105   ALBUMIN g/dL 3.9 4.5   BILIRUBIN mg/dL 0.7 0.3   ALK PHOS U/L 60 74   AST (SGOT) U/L 48* 61*   ALT (SGPT) U/L 81* 118*     Results from last 7 days   Lab Units 24  0456 " "08/29/24  0520 08/28/24  2105   CALCIUM mg/dL 8.5* 8.2* 8.5*   ALBUMIN g/dL 3.9  --  4.5   MAGNESIUM mg/dL  --   --  2.0     No results found for: \"COVID19\"  Glucose   Date/Time Value Ref Range Status   08/30/2024 1039 141 (H) 70 - 130 mg/dL Final   08/30/2024 0612 201 (H) 70 - 130 mg/dL Final   08/29/2024 2038 128 70 - 130 mg/dL Final   08/29/2024 1627 170 (H) 70 - 130 mg/dL Final   08/29/2024 1102 158 (H) 70 - 130 mg/dL Final   08/29/2024 0506 156 (H) 70 - 130 mg/dL Final   08/29/2024 0443 159 (H) 70 - 130 mg/dL Final     No results found for this or any previous visit.      XR Chest 1 View  Narrative: XR CHEST 1 VW-     INDICATION: Shortness of breath     COMPARISON: CT chest angiogram 6/29/2024 and radiographs dating back to  1/27/2015     Impression: No focal consolidation. No pleural effusion or pneumothorax.   Normal size cardiomediastinal silhouette.  No focal osseous  abnormality.       This report was finalized on 8/28/2024 8:56 PM by Dr. Adal England M.D on Workstation: [id number]       Scheduled Medications  aspirin, 81 mg, Oral, Daily  folic acid, 1 mg, Oral, Daily  insulin lispro, 2-7 Units, Subcutaneous, 4x Daily AC & at Bedtime  losartan, 50 mg, Oral, Q24H  nebivolol, 10 mg, Oral, Daily  PHENobarbital, 65 mg, Intravenous, Once   Followed by  [START ON 8/31/2024] PHENobarbital, 32.4 mg, Oral, Once   Followed by  [START ON 8/31/2024] PHENobarbital, 32.4 mg, Oral, Once   Followed by  [START ON 9/1/2024] PHENobarbital, 32.4 mg, Oral, Once  phenytoin ER, 100 mg, Oral, Q6H  sertraline, 50 mg, Oral, Daily  sodium chloride, 10 mL, Intravenous, Q12H  thiamine (B-1) IV, 200 mg, Intravenous, Q8H   Followed by  [START ON 9/3/2024] thiamine, 100 mg, Oral, Daily    Infusions  sodium chloride, 100 mL/hr, Last Rate: 100 mL/hr (08/30/24 1246)    Diet  Diet: Diabetic; Consistent Carbohydrate; Fluid Consistency: Thin (IDDSI 0)       Assessment/Plan     Active Hospital Problems    Diagnosis  POA    **Alcohol " withdrawal [F10.939]  Yes    ROSALINO (generalized anxiety disorder) [F41.1]  Yes    PTSD (post-traumatic stress disorder) [F43.10]  Yes    TBI (traumatic brain injury) [S06.9XAA]  Yes    Primary hypertension [I10]  Yes    Coronary artery disease involving native coronary artery with unstable angina pectoris [I25.110]  Yes    Type 2 diabetes mellitus with hyperglycemia, with long-term current use of insulin [E11.65, Z79.4]  Not Applicable    Seizure disorder [G40.909]  Yes      Resolved Hospital Problems   No resolved problems to display.       44 y.o. male admitted with Alcohol withdrawal.    Alcohol intoxication  History of alcohol withdrawal seizures  CIWA protocol with phenobaribtal  On IV thiamine and folic acid      Coronary artery disease  Hypertension  On aspirin, aspirin, statin, Bystolic, losartan     Type 2 diabetes  SSI     Generalized anxiety disorder  SSRI       Lovenox 40 mg SC daily for DVT prophylaxis.  Full code.  Discussed with patient and nursing staff.  Anticipate discharge home in 2-3 days.      Temo Monique MD  [facility]  08/30/24  13:18 EDT

## 2024-08-30 NOTE — PROGRESS NOTES
Discharge Planning Assessment  [facility]     Patient Name: Bryant Edmonds  MRN: [id number]  Today's Date: 8/30/2024    Admit Date: 8/29/2024    Plan: Home with son then to [facility]  in [place]   Discharge Needs Assessment    No documentation.                  Discharge Plan       Row Name 08/30/24 1454       Plan    Plan Home with son then to [facility]  in [place]    Plan Comments Spoke with pt at bedside to screen for DCP/needs.  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  Pt state that he already had plans to go to [facility] in [place] and had purchased airfare to [place]  which was suppose to leave on Monday 8/26 but ended up at [facility].  Pt did state that his son will pick him up and DC and he will be rescheduling his airfare to [place] shortly after he is DC.   Pt denies any dC needs.                  Continued Care and Services - Admitted Since 8/29/2024    No active coordination exists for this encounter.       Expected Discharge Date and Time       Expected Discharge Date Expected Discharge Time    Sep 2, 2024            Demographic Summary    No documentation.                  Functional Status    No documentation.                  Psychosocial    No documentation.                  Abuse/Neglect    No documentation.                  Legal    No documentation.                  Substance Abuse    No documentation.                  Patient Forms    No documentation.                     RM Ocampo

## 2024-08-30 NOTE — PLAN OF CARE
Problem: Adult Inpatient Plan of Care  Goal: Plan of Care Review  Outcome: Ongoing, Not Progressing  Flowsheets (Taken 8/30/2024 1804)  Progress: no change  Plan of Care Reviewed With: patient  Outcome Evaluation: VSS, room air, CIWA protocol continued, need urine specimen.  Goal: Patient-Specific Goal (Individualized)  Outcome: Ongoing, Not Progressing  Goal: Absence of Hospital-Acquired Illness or Injury  Outcome: Ongoing, Not Progressing  Intervention: Identify and Manage Fall Risk  Recent Flowsheet Documentation  Taken 8/30/2024 1537 by Sera Martinez RN  Safety Promotion/Fall Prevention:   assistive device/personal items within reach   clutter free environment maintained   muscle strengthening facilitated   nonskid shoes/slippers when out of bed   safety round/check completed   room organization consistent  Taken 8/30/2024 1403 by Sera Martinez RN  Safety Promotion/Fall Prevention:   activity supervised   assistive device/personal items within reach   clutter free environment maintained   fall prevention program maintained   nonskid shoes/slippers when out of bed   room organization consistent   safety round/check completed  Taken 8/30/2024 1131 by Sera Martinez RN  Safety Promotion/Fall Prevention:   assistive device/personal items within reach   clutter free environment maintained   nonskid shoes/slippers when out of bed   room organization consistent   safety round/check completed  Taken 8/30/2024 1018 by Sera Martinez RN  Safety Promotion/Fall Prevention:   assistive device/personal items within reach   clutter free environment maintained   muscle strengthening facilitated   nonskid shoes/slippers when out of bed   safety round/check completed   room organization consistent  Taken 8/30/2024 0820 by Sera Martinez RN  Safety Promotion/Fall Prevention:   assistive device/personal items within reach   clutter free environment maintained   muscle strengthening facilitated   nonskid  shoes/slippers when out of bed   safety round/check completed   room organization consistent  Intervention: Prevent Skin Injury  Recent Flowsheet Documentation  Taken 8/30/2024 1537 by Sera Martinez RN  Body Position:   side-lying   right  Taken 8/30/2024 1403 by Sera Martinez RN  Body Position: supine  Taken 8/30/2024 1131 by Sera Martinez RN  Body Position: supine  Taken 8/30/2024 1018 by Sera Martinez RN  Body Position:   tilted   left  Taken 8/30/2024 0820 by Sera Martinez RN  Body Position: position changed independently  Intervention: Prevent and Manage VTE (Venous Thromboembolism) Risk  Recent Flowsheet Documentation  Taken 8/30/2024 0820 by Sera Martinez RN  Activity Management: ambulated in room  Goal: Optimal Comfort and Wellbeing  Outcome: Ongoing, Not Progressing  Goal: Readiness for Transition of Care  Outcome: Ongoing, Not Progressing     Problem: Fall Injury Risk  Goal: Absence of Fall and Fall-Related Injury  Outcome: Ongoing, Not Progressing  Intervention: Promote Injury-Free Environment  Recent Flowsheet Documentation  Taken 8/30/2024 1537 by Sera Martinez RN  Safety Promotion/Fall Prevention:   assistive device/personal items within reach   clutter free environment maintained   muscle strengthening facilitated   nonskid shoes/slippers when out of bed   safety round/check completed   room organization consistent  Taken 8/30/2024 1403 by Sera Martinez RN  Safety Promotion/Fall Prevention:   activity supervised   assistive device/personal items within reach   clutter free environment maintained   fall prevention program maintained   nonskid shoes/slippers when out of bed   room organization consistent   safety round/check completed  Taken 8/30/2024 1131 by Sera Martinez RN  Safety Promotion/Fall Prevention:   assistive device/personal items within reach   clutter free environment maintained   nonskid shoes/slippers when out of bed   room  organization consistent   safety round/check completed  Taken 8/30/2024 1018 by Sera Martinez RN  Safety Promotion/Fall Prevention:   assistive device/personal items within reach   clutter free environment maintained   muscle strengthening facilitated   nonskid shoes/slippers when out of bed   safety round/check completed   room organization consistent  Taken 8/30/2024 0820 by Sera Martinez RN  Safety Promotion/Fall Prevention:   assistive device/personal items within reach   clutter free environment maintained   muscle strengthening facilitated   nonskid shoes/slippers when out of bed   safety round/check completed   room organization consistent   Goal Outcome Evaluation:  Plan of Care Reviewed With: patient        Progress: no change  Outcome Evaluation: VSS, room air, CIWA protocol continued, need urine specimen.

## 2024-08-30 NOTE — PLAN OF CARE
Problem: Adult Inpatient Plan of Care  Goal: Plan of Care Review  Outcome: Ongoing, Progressing  Goal: Patient-Specific Goal (Individualized)  Outcome: Ongoing, Progressing  Goal: Absence of Hospital-Acquired Illness or Injury  Outcome: Ongoing, Progressing  Intervention: Identify and Manage Fall Risk  Recent Flowsheet Documentation  Taken 8/30/2024 0400 by Lizbet Maldonado RN  Safety Promotion/Fall Prevention:   clutter free environment maintained   fall prevention program maintained   lighting adjusted   room organization consistent   toileting scheduled  Taken 8/30/2024 0200 by Lizbet Maldonado RN  Safety Promotion/Fall Prevention:   clutter free environment maintained   fall prevention program maintained   room organization consistent   safety round/check completed  Taken 8/30/2024 0000 by Lizbet Maldonado RN  Safety Promotion/Fall Prevention:   clutter free environment maintained   fall prevention program maintained   lighting adjusted   room organization consistent   safety round/check completed  Taken 8/29/2024 2200 by Lizbet Maldonado RN  Safety Promotion/Fall Prevention:   clutter free environment maintained   fall prevention program maintained   lighting adjusted   room organization consistent   safety round/check completed  Taken 8/29/2024 2137 by Lizbet Maldonado RN  Safety Promotion/Fall Prevention:   safety round/check completed   clutter free environment maintained   fall prevention program maintained   lighting adjusted  Intervention: Prevent Skin Injury  Recent Flowsheet Documentation  Taken 8/30/2024 0400 by Lizbet Maldonado RN  Body Position: position changed independently  Taken 8/30/2024 0200 by iLzbet Maldonado RN  Body Position: position changed independently  Skin Protection:   adhesive use limited   incontinence pads utilized   transparent dressing maintained   tubing/devices free from skin contact  Taken 8/30/2024 0000 by Lizbet Maldonado RN  Body Position: position changed  independently  Taken 8/29/2024 2200 by Lizbet Maldonado RN  Body Position: position changed independently  Taken 8/29/2024 2137 by Lizbet Maldonado RN  Body Position: position changed independently  Skin Protection:   adhesive use limited   incontinence pads utilized   tubing/devices free from skin contact   transparent dressing maintained  Intervention: Prevent and Manage VTE (Venous Thromboembolism) Risk  Recent Flowsheet Documentation  Taken 8/29/2024 2137 by Lizbet Maldonado RN  Activity Management: activity minimized  VTE Prevention/Management:   bilateral   sequential compression devices off   patient refused intervention  Intervention: Prevent Infection  Recent Flowsheet Documentation  Taken 8/30/2024 0400 by Lizbet Maldonado RN  Infection Prevention:   environmental surveillance performed   hand hygiene promoted   single patient room provided  Taken 8/30/2024 0200 by Lizbet Maldonado RN  Infection Prevention:   environmental surveillance performed   hand hygiene promoted   rest/sleep promoted   single patient room provided  Taken 8/30/2024 0000 by Lizbet Maldonado RN  Infection Prevention:   environmental surveillance performed   hand hygiene promoted   personal protective equipment utilized   rest/sleep promoted   single patient room provided  Taken 8/29/2024 2200 by Lizbet Maldonado RN  Infection Prevention:   environmental surveillance performed   hand hygiene promoted   personal protective equipment utilized   rest/sleep promoted   single patient room provided  Taken 8/29/2024 2137 by Lizbet Maldonado RN  Infection Prevention:   environmental surveillance performed   hand hygiene promoted   single patient room provided   personal protective equipment utilized  Goal: Optimal Comfort and Wellbeing  Outcome: Ongoing, Progressing  Intervention: Provide Person-Centered Care  Recent Flowsheet Documentation  Taken 8/30/2024 0200 by Lizbet Maldonado RN  Trust Relationship/Rapport:   care explained   reassurance  provided  Taken 8/29/2024 2137 by Lizbet Maldonado RN  Trust Relationship/Rapport:   care explained   questions encouraged   questions answered   thoughts/feelings acknowledged  Goal: Readiness for Transition of Care  Outcome: Ongoing, Progressing     Problem: Fall Injury Risk  Goal: Absence of Fall and Fall-Related Injury  Outcome: Ongoing, Progressing  Intervention: Identify and Manage Contributors  Recent Flowsheet Documentation  Taken 8/30/2024 0400 by Lizbet Maldonado RN  Medication Review/Management: medications reviewed  Taken 8/30/2024 0200 by Lizbet Maldonado RN  Medication Review/Management: medications reviewed  Taken 8/30/2024 0000 by Lizbet Maldonado RN  Medication Review/Management: medications reviewed  Taken 8/29/2024 2200 by Lizbet Maldonado RN  Medication Review/Management: medications reviewed  Taken 8/29/2024 2137 by Lizbet Maldonado RN  Medication Review/Management: medications reviewed  Intervention: Promote Injury-Free Environment  Recent Flowsheet Documentation  Taken 8/30/2024 0400 by Lizbet Maldonado RN  Safety Promotion/Fall Prevention:   clutter free environment maintained   fall prevention program maintained   lighting adjusted   room organization consistent   toileting scheduled  Taken 8/30/2024 0200 by Lizbet Maldonado RN  Safety Promotion/Fall Prevention:   clutter free environment maintained   fall prevention program maintained   room organization consistent   safety round/check completed  Taken 8/30/2024 0000 by Lizbet Maldonado RN  Safety Promotion/Fall Prevention:   clutter free environment maintained   fall prevention program maintained   lighting adjusted   room organization consistent   safety round/check completed  Taken 8/29/2024 2200 by Lizbet Maldonado RN  Safety Promotion/Fall Prevention:   clutter free environment maintained   fall prevention program maintained   lighting adjusted   room organization consistent   safety round/check completed  Taken 8/29/2024 2137 by  Gross, Lizbet, RN  Safety Promotion/Fall Prevention:   safety round/check completed   clutter free environment maintained   fall prevention program maintained   lighting adjusted     Problem: Fall Injury Risk  Goal: Absence of Fall and Fall-Related Injury  Intervention: Promote Injury-Free Environment  Recent Flowsheet Documentation  Taken 8/30/2024 0400 by Lizbet Maldonado RN  Safety Promotion/Fall Prevention:   clutter free environment maintained   fall prevention program maintained   lighting adjusted   room organization consistent   toileting scheduled  Taken 8/30/2024 0200 by Lizbet Maldonado RN  Safety Promotion/Fall Prevention:   clutter free environment maintained   fall prevention program maintained   room organization consistent   safety round/check completed  Taken 8/30/2024 0000 by Lizbet Maldonado RN  Safety Promotion/Fall Prevention:   clutter free environment maintained   fall prevention program maintained   lighting adjusted   room organization consistent   safety round/check completed  Taken 8/29/2024 2200 by Lizbet Maldonado RN  Safety Promotion/Fall Prevention:   clutter free environment maintained   fall prevention program maintained   lighting adjusted   room organization consistent   safety round/check completed  Taken 8/29/2024 2137 by Lizbet Maldonado RN  Safety Promotion/Fall Prevention:   safety round/check completed   clutter free environment maintained   fall prevention program maintained   lighting adjusted   Goal Outcome Evaluation:

## 2024-08-31 LAB
ALBUMIN SERPL-MCNC: 4 G/DL (ref 3.5–5.2)
ALBUMIN/GLOB SERPL: 1.5 G/DL
ALP SERPL-CCNC: 59 U/L (ref 39–117)
ALT SERPL W P-5'-P-CCNC: 66 U/L (ref 1–41)
ANION GAP SERPL CALCULATED.3IONS-SCNC: 9.5 MMOL/L (ref 5–15)
AST SERPL-CCNC: 30 U/L (ref 1–40)
BILIRUB SERPL-MCNC: 0.5 MG/DL (ref 0–1.2)
BUN SERPL-MCNC: 11 MG/DL (ref 6–20)
BUN/CREAT SERPL: 14.5 (ref 7–25)
CALCIUM SPEC-SCNC: 9.1 MG/DL (ref 8.6–10.5)
CHLORIDE SERPL-SCNC: 103 MMOL/L (ref 98–107)
CO2 SERPL-SCNC: 25.5 MMOL/L (ref 22–29)
CREAT SERPL-MCNC: 0.76 MG/DL (ref 0.76–1.27)
DEPRECATED RDW RBC AUTO: 46 FL (ref 37–54)
EGFRCR SERPLBLD CKD-EPI 2021: 112.3 ML/MIN/1.73
ERYTHROCYTE [DISTWIDTH] IN BLOOD BY AUTOMATED COUNT: 13.4 % (ref 12.3–15.4)
GEN 5 2HR TROPONIN T REFLEX: 20 NG/L
GLOBULIN UR ELPH-MCNC: 2.7 GM/DL
GLUCOSE BLDC GLUCOMTR-MCNC: 138 MG/DL (ref 70–130)
GLUCOSE BLDC GLUCOMTR-MCNC: 138 MG/DL (ref 70–130)
GLUCOSE BLDC GLUCOMTR-MCNC: 147 MG/DL (ref 70–130)
GLUCOSE BLDC GLUCOMTR-MCNC: 164 MG/DL (ref 70–130)
GLUCOSE SERPL-MCNC: 155 MG/DL (ref 65–99)
HBA1C MFR BLD: 7.5 % (ref 4.8–5.6)
HCT VFR BLD AUTO: 45.9 % (ref 37.5–51)
HGB BLD-MCNC: 15.7 G/DL (ref 13–17.7)
MAGNESIUM SERPL-MCNC: 1.9 MG/DL (ref 1.6–2.6)
MCH RBC QN AUTO: 32.3 PG (ref 26.6–33)
MCHC RBC AUTO-ENTMCNC: 34.2 G/DL (ref 31.5–35.7)
MCV RBC AUTO: 94.4 FL (ref 79–97)
PLATELET # BLD AUTO: 151 10*3/MM3 (ref 140–450)
PMV BLD AUTO: 11.5 FL (ref 6–12)
POTASSIUM SERPL-SCNC: 4 MMOL/L (ref 3.5–5.2)
PROT SERPL-MCNC: 6.7 G/DL (ref 6–8.5)
QT INTERVAL: 409 MS
QTC INTERVAL: 472 MS
RBC # BLD AUTO: 4.86 10*6/MM3 (ref 4.14–5.8)
SODIUM SERPL-SCNC: 138 MMOL/L (ref 136–145)
TROPONIN T DELTA: 0 NG/L
TROPONIN T SERPL HS-MCNC: 20 NG/L
WBC NRBC COR # BLD AUTO: 4.06 10*3/MM3 (ref 3.4–10.8)

## 2024-08-31 PROCEDURE — 25810000003 SODIUM CHLORIDE 0.9 % SOLUTION: Performed by: NURSE PRACTITIONER

## 2024-08-31 PROCEDURE — 25010000002 THIAMINE PER 100 MG: Performed by: NURSE PRACTITIONER

## 2024-08-31 PROCEDURE — 93010 ELECTROCARDIOGRAM REPORT: CPT | Performed by: INTERNAL MEDICINE

## 2024-08-31 PROCEDURE — 85027 COMPLETE CBC AUTOMATED: CPT | Performed by: STUDENT IN AN ORGANIZED HEALTH CARE EDUCATION/TRAINING PROGRAM

## 2024-08-31 PROCEDURE — 82948 REAGENT STRIP/BLOOD GLUCOSE: CPT

## 2024-08-31 PROCEDURE — 25010000002 ENOXAPARIN PER 10 MG: Performed by: STUDENT IN AN ORGANIZED HEALTH CARE EDUCATION/TRAINING PROGRAM

## 2024-08-31 PROCEDURE — 80053 COMPREHEN METABOLIC PANEL: CPT | Performed by: STUDENT IN AN ORGANIZED HEALTH CARE EDUCATION/TRAINING PROGRAM

## 2024-08-31 PROCEDURE — 83735 ASSAY OF MAGNESIUM: CPT | Performed by: STUDENT IN AN ORGANIZED HEALTH CARE EDUCATION/TRAINING PROGRAM

## 2024-08-31 PROCEDURE — 25010000002 LORAZEPAM PER 2 MG: Performed by: NURSE PRACTITIONER

## 2024-08-31 PROCEDURE — 83036 HEMOGLOBIN GLYCOSYLATED A1C: CPT | Performed by: STUDENT IN AN ORGANIZED HEALTH CARE EDUCATION/TRAINING PROGRAM

## 2024-08-31 PROCEDURE — 93005 ELECTROCARDIOGRAM TRACING: CPT | Performed by: STUDENT IN AN ORGANIZED HEALTH CARE EDUCATION/TRAINING PROGRAM

## 2024-08-31 PROCEDURE — 63710000001 INSULIN LISPRO (HUMAN) PER 5 UNITS: Performed by: NURSE PRACTITIONER

## 2024-08-31 PROCEDURE — 84484 ASSAY OF TROPONIN QUANT: CPT | Performed by: STUDENT IN AN ORGANIZED HEALTH CARE EDUCATION/TRAINING PROGRAM

## 2024-08-31 RX ORDER — OXYCODONE HYDROCHLORIDE 5 MG/1
5 TABLET ORAL EVERY 4 HOURS PRN
Status: DISCONTINUED | OUTPATIENT
Start: 2024-08-31 | End: 2024-09-02

## 2024-08-31 RX ORDER — TRAMADOL HYDROCHLORIDE 50 MG/1
50 TABLET ORAL ONCE
Status: COMPLETED | OUTPATIENT
Start: 2024-08-31 | End: 2024-08-31

## 2024-08-31 RX ORDER — PANTOPRAZOLE SODIUM 40 MG/1
40 TABLET, DELAYED RELEASE ORAL
Status: DISCONTINUED | OUTPATIENT
Start: 2024-08-31 | End: 2024-09-04 | Stop reason: HOSPADM

## 2024-08-31 RX ADMIN — OXYCODONE HYDROCHLORIDE 5 MG: 5 TABLET ORAL at 15:47

## 2024-08-31 RX ADMIN — LORAZEPAM 1 MG: 2 INJECTION INTRAMUSCULAR; INTRAVENOUS at 08:48

## 2024-08-31 RX ADMIN — TRAMADOL HYDROCHLORIDE 50 MG: 50 TABLET ORAL at 00:53

## 2024-08-31 RX ADMIN — LORAZEPAM 1 MG: 1 TABLET ORAL at 11:11

## 2024-08-31 RX ADMIN — ASPIRIN 81 MG: 81 TABLET, CHEWABLE ORAL at 08:43

## 2024-08-31 RX ADMIN — LORAZEPAM 2 MG: 2 INJECTION INTRAMUSCULAR; INTRAVENOUS at 18:28

## 2024-08-31 RX ADMIN — LOSARTAN POTASSIUM 50 MG: 50 TABLET, FILM COATED ORAL at 08:43

## 2024-08-31 RX ADMIN — OXYCODONE HYDROCHLORIDE 5 MG: 5 TABLET ORAL at 11:11

## 2024-08-31 RX ADMIN — INSULIN LISPRO 2 UNITS: 100 INJECTION, SOLUTION INTRAVENOUS; SUBCUTANEOUS at 21:29

## 2024-08-31 RX ADMIN — PHENYTOIN SODIUM 100 MG: 100 CAPSULE ORAL at 05:55

## 2024-08-31 RX ADMIN — PHENYTOIN SODIUM 100 MG: 100 CAPSULE ORAL at 23:59

## 2024-08-31 RX ADMIN — Medication 10 ML: at 21:32

## 2024-08-31 RX ADMIN — LORAZEPAM 1 MG: 1 TABLET ORAL at 03:15

## 2024-08-31 RX ADMIN — THIAMINE HYDROCHLORIDE 200 MG: 100 INJECTION, SOLUTION INTRAMUSCULAR; INTRAVENOUS at 21:25

## 2024-08-31 RX ADMIN — PHENYTOIN SODIUM 100 MG: 100 CAPSULE ORAL at 11:11

## 2024-08-31 RX ADMIN — LORAZEPAM 1 MG: 1 TABLET ORAL at 14:31

## 2024-08-31 RX ADMIN — NEBIVOLOL 10 MG: 10 TABLET ORAL at 08:43

## 2024-08-31 RX ADMIN — OXYCODONE HYDROCHLORIDE 5 MG: 5 TABLET ORAL at 23:59

## 2024-08-31 RX ADMIN — SERTRALINE 50 MG: 50 TABLET, FILM COATED ORAL at 11:11

## 2024-08-31 RX ADMIN — PANTOPRAZOLE SODIUM 40 MG: 40 TABLET, DELAYED RELEASE ORAL at 11:11

## 2024-08-31 RX ADMIN — THIAMINE HYDROCHLORIDE 200 MG: 100 INJECTION, SOLUTION INTRAMUSCULAR; INTRAVENOUS at 06:45

## 2024-08-31 RX ADMIN — OXYCODONE HYDROCHLORIDE 5 MG: 5 TABLET ORAL at 19:56

## 2024-08-31 RX ADMIN — LORAZEPAM 1 MG: 1 TABLET ORAL at 00:59

## 2024-08-31 RX ADMIN — ENOXAPARIN SODIUM 40 MG: 100 INJECTION SUBCUTANEOUS at 13:50

## 2024-08-31 RX ADMIN — LORAZEPAM 1 MG: 1 TABLET ORAL at 21:24

## 2024-08-31 RX ADMIN — PHENOBARBITAL 32.4 MG: 32.4 TABLET ORAL at 03:15

## 2024-08-31 RX ADMIN — THIAMINE HYDROCHLORIDE 200 MG: 100 INJECTION, SOLUTION INTRAMUSCULAR; INTRAVENOUS at 13:50

## 2024-08-31 RX ADMIN — PHENYTOIN SODIUM 100 MG: 100 CAPSULE ORAL at 18:24

## 2024-08-31 RX ADMIN — Medication 10 ML: at 08:44

## 2024-08-31 RX ADMIN — LORAZEPAM 1 MG: 1 TABLET ORAL at 23:59

## 2024-08-31 RX ADMIN — SODIUM CHLORIDE 100 ML/HR: 9 INJECTION, SOLUTION INTRAVENOUS at 11:11

## 2024-08-31 RX ADMIN — PHENOBARBITAL 32.4 MG: 32.4 TABLET ORAL at 15:47

## 2024-08-31 RX ADMIN — ACETAMINOPHEN 325MG 650 MG: 325 TABLET ORAL at 05:55

## 2024-08-31 RX ADMIN — LORAZEPAM 1 MG: 1 TABLET ORAL at 05:55

## 2024-08-31 RX ADMIN — FOLIC ACID 1 MG: 1 TABLET ORAL at 08:43

## 2024-08-31 NOTE — PROGRESS NOTES
Name: Bryant Edmonds ADMIT: 2024   : 1980  PCP: Provider, No Known    MRN: [id number] LOS: 1 days   AGE/SEX: 44 y.o. male  ROOM: [facility]     Subjective     Last CIWA 10. Endorsing substernal chest pain that he reproduces on palpation.       Objective   Objective   Vital Signs  Temp:  [97.6 °F (36.4 °C)-98.9 °F (37.2 °C)] 97.6 °F (36.4 °C)  Heart Rate:  [74-85] 74  Resp:  [17-20] 17  BP: (123-149)/(79-96) 149/96  SpO2:  [96 %-99 %] 97 %  on   ;   Device (Oxygen Therapy): room air  Body mass index is 28.06 kg/m².  Physical Exam  Constitutional:       General: He is not in acute distress.  HENT:      Head: Normocephalic and atraumatic.   Cardiovascular:      Rate and Rhythm: Normal rate and regular rhythm.   Pulmonary:      Effort: Pulmonary effort is normal. No respiratory distress.   Abdominal:      General: There is no distension.      Palpations: Abdomen is soft.      Tenderness: There is no abdominal tenderness.   Musculoskeletal:      Comments: Chest wall tender to palpation around the xyphoid process    Neurological:      General: No focal deficit present.      Mental Status: He is alert and oriented to person, place, and time.         Results Review     I reviewed the patient's new clinical results.  Results from last 7 days   Lab Units 24  0558 24  0456 24  0524   WBC 10*3/mm3 4.06 3.81 4.62 5.73   HEMOGLOBIN g/dL 15.7 15.4 15.1 17.2   PLATELETS 10*3/mm3 151 161 182 247     Results from last 7 days   Lab Units 24  0559 24  0456 24  0524   SODIUM mmol/L 138 136 138 143   POTASSIUM mmol/L 4.0 3.6 3.6 3.7   CHLORIDE mmol/L 103 103 105 104   CO2 mmol/L 25.5 21.2* 21.0* 24.0   BUN mg/dL 11 9 13 14   CREATININE mg/dL 0.76 0.67* 0.63* 0.75*   GLUCOSE mg/dL 155* 182* 183* 183*   Estimated Creatinine Clearance: 136.2 mL/min (by C-G formula based on SCr of 0.76 mg/dL).  Results from last 7 days   Lab Units 24  0559 24  1991  "08/28/24  2105   ALBUMIN g/dL 4.0 3.9 4.5   BILIRUBIN mg/dL 0.5 0.7 0.3   ALK PHOS U/L 59 60 74   AST (SGOT) U/L 30 48* 61*   ALT (SGPT) U/L 66* 81* 118*     Results from last 7 days   Lab Units 08/31/24  0930 08/31/24  0559 08/30/24  0456 08/29/24  0520 08/28/24  2105   CALCIUM mg/dL  --  9.1 8.5* 8.2* 8.5*   ALBUMIN g/dL  --  4.0 3.9  --  4.5   MAGNESIUM mg/dL 1.9  --   --   --  2.0     No results found for: \"COVID19\"  Hemoglobin A1C   Date/Time Value Ref Range Status   08/31/2024 0558 7.50 (H) 4.80 - 5.60 % Final     Glucose   Date/Time Value Ref Range Status   08/31/2024 1054 138 (H) 70 - 130 mg/dL Final   08/31/2024 0628 138 (H) 70 - 130 mg/dL Final   08/30/2024 2020 200 (H) 70 - 130 mg/dL Final   08/30/2024 1628 149 (H) 70 - 130 mg/dL Final   08/30/2024 1039 141 (H) 70 - 130 mg/dL Final   08/30/2024 0612 201 (H) 70 - 130 mg/dL Final   08/29/2024 2038 128 70 - 130 mg/dL Final     No results found for this or any previous visit.      XR Chest 1 View  Narrative: XR CHEST 1 VW-     INDICATION: Shortness of breath     COMPARISON: CT chest angiogram 6/29/2024 and radiographs dating back to  1/27/2015     Impression: No focal consolidation. No pleural effusion or pneumothorax.   Normal size cardiomediastinal silhouette.  No focal osseous  abnormality.       This report was finalized on 8/28/2024 8:56 PM by Dr. Adal England M.D on Workstation: [id number]       Scheduled Medications  aspirin, 81 mg, Oral, Daily  enoxaparin, 40 mg, Subcutaneous, Q24H  folic acid, 1 mg, Oral, Daily  insulin lispro, 2-7 Units, Subcutaneous, 4x Daily AC & at Bedtime  losartan, 50 mg, Oral, Q24H  nebivolol, 10 mg, Oral, Daily  pantoprazole, 40 mg, Oral, Daily With Breakfast & Dinner  PHENobarbital, 32.4 mg, Oral, Once   Followed by  [START ON 9/1/2024] PHENobarbital, 32.4 mg, Oral, Once  phenytoin ER, 100 mg, Oral, Q6H  sertraline, 50 mg, Oral, Daily  sodium chloride, 10 mL, Intravenous, Q12H  thiamine (B-1) IV, 200 mg, Intravenous, " Q8H   Followed by  [START ON 9/3/2024] thiamine, 100 mg, Oral, Daily    Infusions  sodium chloride, 100 mL/hr, Last Rate: 100 mL/hr (08/31/24 1111)    Diet  Diet: Diabetic; Consistent Carbohydrate; Fluid Consistency: Thin (IDDSI 0)       Assessment/Plan     Active Hospital Problems    Diagnosis  POA    **Alcohol withdrawal [F10.939]  Yes    ROSALINO (generalized anxiety disorder) [F41.1]  Yes    PTSD (post-traumatic stress disorder) [F43.10]  Yes    TBI (traumatic brain injury) [S06.9XAA]  Yes    Primary hypertension [I10]  Yes    Coronary artery disease involving native coronary artery with unstable angina pectoris [I25.110]  Yes    Type 2 diabetes mellitus with hyperglycemia, with long-term current use of insulin [E11.65, Z79.4]  Not Applicable    Seizure disorder [G40.909]  Yes      Resolved Hospital Problems   No resolved problems to display.       44 y.o. male admitted with Alcohol withdrawal.    Alcohol intoxication  History of alcohol withdrawal seizures  CIWA protocol with phenobaribtal  On IV thiamine and folic acid     Chest wall tenderness  Troponin and EKG wnl  Reports multiple falls  Obtain CT chest without contrast to evaluate for rib fractures      Coronary artery disease  Hypertension  On aspirin, aspirin, statin, Bystolic, losartan     Type 2 diabetes  SSI     Generalized anxiety disorder  SSRI       Lovenox 40 mg SC daily for DVT prophylaxis.  Full code.  Discussed with patient and nursing staff.  Anticipate discharge home in 2-3 days.      Temo Monique MD  [facility]  08/31/24  11:33 EDT

## 2024-08-31 NOTE — PLAN OF CARE
AAOX4. SR. RA. CIWA 8-12. PRN meds given.       Problem: Adult Inpatient Plan of Care  Goal: Absence of Hospital-Acquired Illness or Injury  Intervention: Identify and Manage Fall Risk  Recent Flowsheet Documentation  Taken 8/31/2024 1617 by Arlene Castano RN  Safety Promotion/Fall Prevention:   safety round/check completed   room organization consistent   nonskid shoes/slippers when out of bed   lighting adjusted   clutter free environment maintained   assistive device/personal items within reach  Taken 8/31/2024 1430 by Arlene Castano RN  Safety Promotion/Fall Prevention:   assistive device/personal items within reach   clutter free environment maintained   nonskid shoes/slippers when out of bed   safety round/check completed   room organization consistent  Taken 8/31/2024 1210 by Arlene Castano RN  Safety Promotion/Fall Prevention:   safety round/check completed   room organization consistent   nonskid shoes/slippers when out of bed   lighting adjusted   assistive device/personal items within reach   clutter free environment maintained  Taken 8/31/2024 1030 by Arlene Castano RN  Safety Promotion/Fall Prevention:   safety round/check completed   room organization consistent   nonskid shoes/slippers when out of bed   lighting adjusted   activity supervised   clutter free environment maintained   assistive device/personal items within reach  Taken 8/31/2024 0840 by Arlene Castano RN  Safety Promotion/Fall Prevention:   activity supervised   assistive device/personal items within reach   clutter free environment maintained   lighting adjusted   safety round/check completed   room organization consistent   nonskid shoes/slippers when out of bed  Intervention: Prevent Skin Injury  Recent Flowsheet Documentation  Taken 8/31/2024 1617 by Arlene Castano RN  Body Position:   position changed independently   side-lying   left  Taken 8/31/2024 1430 by Arlene Castano RN  Body Position: position changed  independently  Skin Protection:   adhesive use limited   tubing/devices free from skin contact   transparent dressing maintained   skin-to-skin areas padded   skin-to-device areas padded  Taken 8/31/2024 1210 by Arlene Castano RN  Body Position: position changed independently  Taken 8/31/2024 1030 by Arlene Castano RN  Body Position: position changed independently  Taken 8/31/2024 0840 by Arlene Castano RN  Body Position: position changed independently  Skin Protection:   adhesive use limited   transparent dressing maintained   tubing/devices free from skin contact   protective footwear used  Intervention: Prevent and Manage VTE (Venous Thromboembolism) Risk  Recent Flowsheet Documentation  Taken 8/31/2024 1617 by Arlene Castano RN  Activity Management: up ad merced  Taken 8/31/2024 1430 by Arlene Castano RN  Activity Management: up ad merced  Range of Motion: active ROM (range of motion) encouraged  Taken 8/31/2024 1030 by Arlene Castano RN  Activity Management: up ad merced  Taken 8/31/2024 0840 by Arlene Castano RN  Activity Management: activity encouraged  VTE Prevention/Management:   bilateral   sequential compression devices off  Range of Motion: active ROM (range of motion) encouraged  Intervention: Prevent Infection  Recent Flowsheet Documentation  Taken 8/31/2024 1617 by Arlene Castano RN  Infection Prevention:   single patient room provided   rest/sleep promoted   personal protective equipment utilized   hand hygiene promoted  Taken 8/31/2024 1430 by Arlene Castano RN  Infection Prevention:   single patient room provided   rest/sleep promoted   personal protective equipment utilized   hand hygiene promoted  Taken 8/31/2024 1210 by Arlene Castano RN  Infection Prevention:   single patient room provided   rest/sleep promoted   hand hygiene promoted   personal protective equipment utilized  Taken 8/31/2024 1030 by Arlene Castano RN  Infection Prevention:   single patient room  provided   personal protective equipment utilized   rest/sleep promoted   hand hygiene promoted  Taken 8/31/2024 0840 by Arlene Castano RN  Infection Prevention:   single patient room provided   rest/sleep promoted   personal protective equipment utilized   hand hygiene promoted  Goal: Optimal Comfort and Wellbeing  Intervention: Monitor Pain and Promote Comfort  Recent Flowsheet Documentation  Taken 8/31/2024 1547 by Arlene Castano RN  Pain Management Interventions: see MAR  Taken 8/31/2024 0840 by Arlene Castano RN  Pain Management Interventions:   see MAR   pillow support provided   position adjusted   care clustered  Intervention: Provide Person-Centered Care  Recent Flowsheet Documentation  Taken 8/31/2024 1430 by Arlene Castano RN  Trust Relationship/Rapport:   choices provided   care explained   emotional support provided   empathic listening provided   questions answered   questions encouraged   reassurance provided   thoughts/feelings acknowledged  Taken 8/31/2024 0840 by Arlene Castano RN  Trust Relationship/Rapport:   care explained   choices provided   questions answered   questions encouraged   thoughts/feelings acknowledged

## 2024-08-31 NOTE — PLAN OF CARE
Goal Outcome Evaluation:  Plan of Care Reviewed With: patient           Outcome Evaluation: vss.a/o4,up ad merced,CIWA protocol continued, pain managed with prn pain meds per, compliant with plan of care.

## 2024-08-31 NOTE — NURSING NOTE
"[facility] follow-up d/t ETOH.     Patient RIB. The patient reported \"I feel fine\" but reported sweating, shaking, and diarrhea. Last CIWA 10. The patient reported good sleep. The patient denied any ETOH cravings. The patient voiced his plan is to go to a treatment facility in [place]. The patient voiced he was supposed to go last week but they are still holding a bed for him. 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 The patient voiced his wife has talked with him about possibly doing treatment in [place] before going to [place]. The patient was provided with the [facility] handout. Access following.   "

## 2024-09-01 ENCOUNTER — APPOINTMENT (OUTPATIENT)
Dept: CT IMAGING | Facility: HOSPITAL | Age: 44
End: 2024-09-01
Payer: COMMERCIAL

## 2024-09-01 LAB
ALBUMIN SERPL-MCNC: 4.1 G/DL (ref 3.5–5.2)
ALBUMIN/GLOB SERPL: 1.6 G/DL
ALP SERPL-CCNC: 60 U/L (ref 39–117)
ALT SERPL W P-5'-P-CCNC: 58 U/L (ref 1–41)
ANION GAP SERPL CALCULATED.3IONS-SCNC: 11 MMOL/L (ref 5–15)
AST SERPL-CCNC: 33 U/L (ref 1–40)
BILIRUB SERPL-MCNC: 0.4 MG/DL (ref 0–1.2)
BUN SERPL-MCNC: 12 MG/DL (ref 6–20)
BUN/CREAT SERPL: 19.4 (ref 7–25)
CALCIUM SPEC-SCNC: 8.9 MG/DL (ref 8.6–10.5)
CHLORIDE SERPL-SCNC: 103 MMOL/L (ref 98–107)
CO2 SERPL-SCNC: 22 MMOL/L (ref 22–29)
CREAT SERPL-MCNC: 0.62 MG/DL (ref 0.76–1.27)
DEPRECATED RDW RBC AUTO: 43.9 FL (ref 37–54)
EGFRCR SERPLBLD CKD-EPI 2021: 119.4 ML/MIN/1.73
ERYTHROCYTE [DISTWIDTH] IN BLOOD BY AUTOMATED COUNT: 13 % (ref 12.3–15.4)
GLOBULIN UR ELPH-MCNC: 2.5 GM/DL
GLUCOSE BLDC GLUCOMTR-MCNC: 124 MG/DL (ref 70–130)
GLUCOSE BLDC GLUCOMTR-MCNC: 159 MG/DL (ref 70–130)
GLUCOSE BLDC GLUCOMTR-MCNC: 171 MG/DL (ref 70–130)
GLUCOSE BLDC GLUCOMTR-MCNC: 191 MG/DL (ref 70–130)
GLUCOSE SERPL-MCNC: 162 MG/DL (ref 65–99)
HCT VFR BLD AUTO: 46.5 % (ref 37.5–51)
HGB BLD-MCNC: 15.8 G/DL (ref 13–17.7)
MCH RBC QN AUTO: 31.6 PG (ref 26.6–33)
MCHC RBC AUTO-ENTMCNC: 34 G/DL (ref 31.5–35.7)
MCV RBC AUTO: 93 FL (ref 79–97)
PLATELET # BLD AUTO: 143 10*3/MM3 (ref 140–450)
PMV BLD AUTO: 11.7 FL (ref 6–12)
POTASSIUM SERPL-SCNC: 4.2 MMOL/L (ref 3.5–5.2)
PROT SERPL-MCNC: 6.6 G/DL (ref 6–8.5)
RBC # BLD AUTO: 5 10*6/MM3 (ref 4.14–5.8)
SODIUM SERPL-SCNC: 136 MMOL/L (ref 136–145)
WBC NRBC COR # BLD AUTO: 4.7 10*3/MM3 (ref 3.4–10.8)

## 2024-09-01 PROCEDURE — 25010000002 THIAMINE PER 100 MG: Performed by: NURSE PRACTITIONER

## 2024-09-01 PROCEDURE — 82948 REAGENT STRIP/BLOOD GLUCOSE: CPT

## 2024-09-01 PROCEDURE — 85027 COMPLETE CBC AUTOMATED: CPT | Performed by: STUDENT IN AN ORGANIZED HEALTH CARE EDUCATION/TRAINING PROGRAM

## 2024-09-01 PROCEDURE — 63710000001 INSULIN LISPRO (HUMAN) PER 5 UNITS: Performed by: NURSE PRACTITIONER

## 2024-09-01 PROCEDURE — 25810000003 SODIUM CHLORIDE 0.9 % SOLUTION: Performed by: NURSE PRACTITIONER

## 2024-09-01 PROCEDURE — 25010000002 THIAMINE HCL 200 MG/2ML SOLUTION: Performed by: NURSE PRACTITIONER

## 2024-09-01 PROCEDURE — 25010000002 ENOXAPARIN PER 10 MG: Performed by: STUDENT IN AN ORGANIZED HEALTH CARE EDUCATION/TRAINING PROGRAM

## 2024-09-01 PROCEDURE — 80053 COMPREHEN METABOLIC PANEL: CPT | Performed by: STUDENT IN AN ORGANIZED HEALTH CARE EDUCATION/TRAINING PROGRAM

## 2024-09-01 PROCEDURE — 71250 CT THORAX DX C-: CPT

## 2024-09-01 RX ADMIN — LORAZEPAM 1 MG: 1 TABLET ORAL at 04:39

## 2024-09-01 RX ADMIN — LORAZEPAM 1 MG: 1 TABLET ORAL at 08:21

## 2024-09-01 RX ADMIN — FOLIC ACID 1 MG: 1 TABLET ORAL at 08:08

## 2024-09-01 RX ADMIN — INSULIN LISPRO 2 UNITS: 100 INJECTION, SOLUTION INTRAVENOUS; SUBCUTANEOUS at 20:57

## 2024-09-01 RX ADMIN — LORAZEPAM 1 MG: 1 TABLET ORAL at 20:56

## 2024-09-01 RX ADMIN — SERTRALINE 50 MG: 50 TABLET, FILM COATED ORAL at 08:08

## 2024-09-01 RX ADMIN — INSULIN LISPRO 2 UNITS: 100 INJECTION, SOLUTION INTRAVENOUS; SUBCUTANEOUS at 12:09

## 2024-09-01 RX ADMIN — ENOXAPARIN SODIUM 40 MG: 100 INJECTION SUBCUTANEOUS at 14:40

## 2024-09-01 RX ADMIN — LORAZEPAM 1 MG: 1 TABLET ORAL at 14:53

## 2024-09-01 RX ADMIN — Medication 10 ML: at 08:09

## 2024-09-01 RX ADMIN — LORAZEPAM 1 MG: 1 TABLET ORAL at 12:19

## 2024-09-01 RX ADMIN — PHENYTOIN SODIUM 100 MG: 100 CAPSULE ORAL at 05:35

## 2024-09-01 RX ADMIN — LOSARTAN POTASSIUM 50 MG: 50 TABLET, FILM COATED ORAL at 08:08

## 2024-09-01 RX ADMIN — OXYCODONE HYDROCHLORIDE 5 MG: 5 TABLET ORAL at 16:51

## 2024-09-01 RX ADMIN — PANTOPRAZOLE SODIUM 40 MG: 40 TABLET, DELAYED RELEASE ORAL at 08:09

## 2024-09-01 RX ADMIN — PHENYTOIN SODIUM 100 MG: 100 CAPSULE ORAL at 12:09

## 2024-09-01 RX ADMIN — PHENYTOIN SODIUM 100 MG: 100 CAPSULE ORAL at 17:57

## 2024-09-01 RX ADMIN — THIAMINE HYDROCHLORIDE 200 MG: 100 INJECTION, SOLUTION INTRAMUSCULAR; INTRAVENOUS at 05:35

## 2024-09-01 RX ADMIN — PHENOBARBITAL 32.4 MG: 32.4 TABLET ORAL at 05:35

## 2024-09-01 RX ADMIN — OXYCODONE HYDROCHLORIDE 5 MG: 5 TABLET ORAL at 09:06

## 2024-09-01 RX ADMIN — OXYCODONE HYDROCHLORIDE 5 MG: 5 TABLET ORAL at 04:39

## 2024-09-01 RX ADMIN — SODIUM CHLORIDE 100 ML/HR: 9 INJECTION, SOLUTION INTRAVENOUS at 05:34

## 2024-09-01 RX ADMIN — THIAMINE HYDROCHLORIDE 200 MG: 100 INJECTION, SOLUTION INTRAMUSCULAR; INTRAVENOUS at 14:40

## 2024-09-01 RX ADMIN — THIAMINE HYDROCHLORIDE 200 MG: 100 INJECTION, SOLUTION INTRAMUSCULAR; INTRAVENOUS at 20:56

## 2024-09-01 RX ADMIN — SODIUM CHLORIDE 100 ML/HR: 9 INJECTION, SOLUTION INTRAVENOUS at 18:02

## 2024-09-01 RX ADMIN — PANTOPRAZOLE SODIUM 40 MG: 40 TABLET, DELAYED RELEASE ORAL at 17:57

## 2024-09-01 RX ADMIN — NEBIVOLOL 10 MG: 10 TABLET ORAL at 08:08

## 2024-09-01 RX ADMIN — Medication 10 ML: at 20:57

## 2024-09-01 RX ADMIN — OXYCODONE HYDROCHLORIDE 5 MG: 5 TABLET ORAL at 12:58

## 2024-09-01 RX ADMIN — OXYCODONE HYDROCHLORIDE 5 MG: 5 TABLET ORAL at 20:56

## 2024-09-01 RX ADMIN — ASPIRIN 81 MG: 81 TABLET, CHEWABLE ORAL at 08:08

## 2024-09-01 RX ADMIN — LORAZEPAM 1 MG: 1 TABLET ORAL at 17:57

## 2024-09-01 RX ADMIN — INSULIN LISPRO 2 UNITS: 100 INJECTION, SOLUTION INTRAVENOUS; SUBCUTANEOUS at 08:09

## 2024-09-01 NOTE — PROGRESS NOTES
Access did follow up on pt. Pt sleeping. Nurse states pt's CIWA was a 10. Pt has necessary resources for [facility] tx. Access will continue to follow.

## 2024-09-01 NOTE — PLAN OF CARE
Goal Outcome Evaluation:  Plan of Care Reviewed With: patient        Progress: improving  Outcome Evaluation: VSS (see chart), AOx4, room air, ad merced, CIWA protocol maintained, pain managed with PRN pain meds, call light within reach, continue plan of care                                5

## 2024-09-01 NOTE — PROGRESS NOTES
Name: Bryant Edmonds ADMIT: 2024   : 1980  PCP: Provider, No Known    MRN: [id number] LOS: 2 days   AGE/SEX: 44 y.o. male  ROOM: [facility]/     Subjective     Last CIWA 12. Still states he feels poorly.       Objective   Objective   Vital Signs  Temp:  [97.3 °F (36.3 °C)-98.5 °F (36.9 °C)] 97.7 °F (36.5 °C)  Heart Rate:  [68-79] 68  Resp:  [16-18] 16  BP: (111-143)/(68-97) 111/68  SpO2:  [94 %-98 %] 96 %  on   ;   Device (Oxygen Therapy): room air  Body mass index is 28.06 kg/m².  Physical Exam  Constitutional:       General: He is not in acute distress.  HENT:      Head: Normocephalic and atraumatic.   Cardiovascular:      Rate and Rhythm: Normal rate and regular rhythm.   Pulmonary:      Effort: Pulmonary effort is normal. No respiratory distress.   Abdominal:      General: There is no distension.      Palpations: Abdomen is soft.      Tenderness: There is no abdominal tenderness.   Musculoskeletal:      Comments: Chest wall tender to palpation around the xyphoid process    Neurological:      General: No focal deficit present.      Mental Status: He is alert and oriented to person, place, and time.     Exam reviewed and updated 24    Results Review     I reviewed the patient's new clinical results.  Results from last 7 days   Lab Units 24  0556 24  0558 24  0456 24  0520   WBC 10*3/mm3 4.70 4.06 3.81 4.62   HEMOGLOBIN g/dL 15.8 15.7 15.4 15.1   PLATELETS 10*3/mm3 143 151 161 182     Results from last 7 days   Lab Units 24  0556 24  0559 24  0456 24  0520   SODIUM mmol/L 136 138 136 138   POTASSIUM mmol/L 4.2 4.0 3.6 3.6   CHLORIDE mmol/L 103 103 103 105   CO2 mmol/L 22.0 25.5 21.2* 21.0*   BUN mg/dL 12 11 9 13   CREATININE mg/dL 0.62* 0.76 0.67* 0.63*   GLUCOSE mg/dL 162* 155* 182* 183*   Estimated Creatinine Clearance: 167 mL/min (A) (by C-G formula based on SCr of 0.62 mg/dL (L)).  Results from last 7 days   Lab Units 24  7872  "08/31/24  0559 08/30/24  0456 08/28/24  2105   ALBUMIN g/dL 4.1 4.0 3.9 4.5   BILIRUBIN mg/dL 0.4 0.5 0.7 0.3   ALK PHOS U/L 60 59 60 74   AST (SGOT) U/L 33 30 48* 61*   ALT (SGPT) U/L 58* 66* 81* 118*     Results from last 7 days   Lab Units 09/01/24  0556 08/31/24  0930 08/31/24  0559 08/30/24  0456 08/29/24  0520 08/28/24  2105   CALCIUM mg/dL 8.9  --  9.1 8.5* 8.2* 8.5*   ALBUMIN g/dL 4.1  --  4.0 3.9  --  4.5   MAGNESIUM mg/dL  --  1.9  --   --   --  2.0     No results found for: \"COVID19\"  Hemoglobin A1C   Date/Time Value Ref Range Status   08/31/2024 0558 7.50 (H) 4.80 - 5.60 % Final     Glucose   Date/Time Value Ref Range Status   09/01/2024 0538 159 (H) 70 - 130 mg/dL Final   08/31/2024 2050 164 (H) 70 - 130 mg/dL Final   08/31/2024 1555 147 (H) 70 - 130 mg/dL Final   08/31/2024 1054 138 (H) 70 - 130 mg/dL Final   08/31/2024 0628 138 (H) 70 - 130 mg/dL Final   08/30/2024 2020 200 (H) 70 - 130 mg/dL Final   08/30/2024 1628 149 (H) 70 - 130 mg/dL Final     No results found for this or any previous visit.      XR Chest 1 View  Narrative: XR CHEST 1 VW-     INDICATION: Shortness of breath     COMPARISON: CT chest angiogram 6/29/2024 and radiographs dating back to  1/27/2015     Impression: No focal consolidation. No pleural effusion or pneumothorax.   Normal size cardiomediastinal silhouette.  No focal osseous  abnormality.       This report was finalized on 8/28/2024 8:56 PM by Dr. Adal England M.D on Workstation: [id number]       Scheduled Medications  aspirin, 81 mg, Oral, Daily  enoxaparin, 40 mg, Subcutaneous, Q24H  folic acid, 1 mg, Oral, Daily  insulin lispro, 2-7 Units, Subcutaneous, 4x Daily AC & at Bedtime  losartan, 50 mg, Oral, Q24H  nebivolol, 10 mg, Oral, Daily  pantoprazole, 40 mg, Oral, Daily With Breakfast & Dinner  phenytoin ER, 100 mg, Oral, Q6H  sertraline, 50 mg, Oral, Daily  sodium chloride, 10 mL, Intravenous, Q12H  thiamine (B-1) IV, 200 mg, Intravenous, Q8H   Followed by  [START ON " 9/3/2024] thiamine, 100 mg, Oral, Daily    Infusions  sodium chloride, 100 mL/hr, Last Rate: 100 mL/hr (09/01/24 0534)    Diet  Diet: Diabetic; Consistent Carbohydrate; Fluid Consistency: Thin (IDDSI 0)       Assessment/Plan     Active Hospital Problems    Diagnosis  POA    **Alcohol withdrawal [F10.939]  Yes    ROSALINO (generalized anxiety disorder) [F41.1]  Yes    PTSD (post-traumatic stress disorder) [F43.10]  Yes    TBI (traumatic brain injury) [S06.9XAA]  Yes    Primary hypertension [I10]  Yes    Coronary artery disease involving native coronary artery with unstable angina pectoris [I25.110]  Yes    Type 2 diabetes mellitus with hyperglycemia, with long-term current use of insulin [E11.65, Z79.4]  Not Applicable    Seizure disorder [G40.909]  Yes      Resolved Hospital Problems   No resolved problems to display.       44 y.o. male admitted with Alcohol withdrawal.    Alcohol intoxication  History of alcohol withdrawal seizures  CIWA protocol with phenobaribtal  On IV thiamine and folic acid     Chest wall tenderness  Troponin and EKG wnl  Reports multiple falls  Obtain CT chest without contrast to evaluate for rib fractures- remains pending      Coronary artery disease  Hypertension  On aspirin, aspirin, statin, Bystolic, losartan     Type 2 diabetes  SSI     Generalized anxiety disorder  SSRI       Lovenox 40 mg SC daily for DVT prophylaxis.  Full code.  Discussed with patient and nursing staff.  Anticipate discharge home in 2-3 days.      Temo Monique MD  [facility]  09/01/24  11:04 EDT

## 2024-09-02 LAB
ALBUMIN SERPL-MCNC: 4 G/DL (ref 3.5–5.2)
ALBUMIN/GLOB SERPL: 1.5 G/DL
ALP SERPL-CCNC: 61 U/L (ref 39–117)
ALT SERPL W P-5'-P-CCNC: 54 U/L (ref 1–41)
ANION GAP SERPL CALCULATED.3IONS-SCNC: 10 MMOL/L (ref 5–15)
AST SERPL-CCNC: 30 U/L (ref 1–40)
BILIRUB SERPL-MCNC: 0.4 MG/DL (ref 0–1.2)
BUN SERPL-MCNC: 11 MG/DL (ref 6–20)
BUN/CREAT SERPL: 14.7 (ref 7–25)
CALCIUM SPEC-SCNC: 9 MG/DL (ref 8.6–10.5)
CHLORIDE SERPL-SCNC: 105 MMOL/L (ref 98–107)
CO2 SERPL-SCNC: 22 MMOL/L (ref 22–29)
CREAT SERPL-MCNC: 0.75 MG/DL (ref 0.76–1.27)
DEPRECATED RDW RBC AUTO: 45.3 FL (ref 37–54)
EGFRCR SERPLBLD CKD-EPI 2021: 112.7 ML/MIN/1.73
ERYTHROCYTE [DISTWIDTH] IN BLOOD BY AUTOMATED COUNT: 13.1 % (ref 12.3–15.4)
GLOBULIN UR ELPH-MCNC: 2.7 GM/DL
GLUCOSE BLDC GLUCOMTR-MCNC: 125 MG/DL (ref 70–130)
GLUCOSE BLDC GLUCOMTR-MCNC: 167 MG/DL (ref 70–130)
GLUCOSE BLDC GLUCOMTR-MCNC: 186 MG/DL (ref 70–130)
GLUCOSE BLDC GLUCOMTR-MCNC: 210 MG/DL (ref 70–130)
GLUCOSE SERPL-MCNC: 131 MG/DL (ref 65–99)
HCT VFR BLD AUTO: 47.3 % (ref 37.5–51)
HGB BLD-MCNC: 16.2 G/DL (ref 13–17.7)
MCH RBC QN AUTO: 32.3 PG (ref 26.6–33)
MCHC RBC AUTO-ENTMCNC: 34.2 G/DL (ref 31.5–35.7)
MCV RBC AUTO: 94.2 FL (ref 79–97)
PLATELET # BLD AUTO: 148 10*3/MM3 (ref 140–450)
PMV BLD AUTO: 11.9 FL (ref 6–12)
POTASSIUM SERPL-SCNC: 4 MMOL/L (ref 3.5–5.2)
PROT SERPL-MCNC: 6.7 G/DL (ref 6–8.5)
RBC # BLD AUTO: 5.02 10*6/MM3 (ref 4.14–5.8)
SODIUM SERPL-SCNC: 137 MMOL/L (ref 136–145)
WBC NRBC COR # BLD AUTO: 4.58 10*3/MM3 (ref 3.4–10.8)

## 2024-09-02 PROCEDURE — 63710000001 INSULIN LISPRO (HUMAN) PER 5 UNITS: Performed by: NURSE PRACTITIONER

## 2024-09-02 PROCEDURE — 25010000002 THIAMINE HCL 200 MG/2ML SOLUTION: Performed by: NURSE PRACTITIONER

## 2024-09-02 PROCEDURE — 25010000002 ENOXAPARIN PER 10 MG: Performed by: STUDENT IN AN ORGANIZED HEALTH CARE EDUCATION/TRAINING PROGRAM

## 2024-09-02 PROCEDURE — 25810000003 SODIUM CHLORIDE 0.9 % SOLUTION: Performed by: NURSE PRACTITIONER

## 2024-09-02 PROCEDURE — 80053 COMPREHEN METABOLIC PANEL: CPT | Performed by: STUDENT IN AN ORGANIZED HEALTH CARE EDUCATION/TRAINING PROGRAM

## 2024-09-02 PROCEDURE — 82948 REAGENT STRIP/BLOOD GLUCOSE: CPT

## 2024-09-02 PROCEDURE — 85027 COMPLETE CBC AUTOMATED: CPT | Performed by: STUDENT IN AN ORGANIZED HEALTH CARE EDUCATION/TRAINING PROGRAM

## 2024-09-02 PROCEDURE — 25010000002 LORAZEPAM PER 2 MG: Performed by: NURSE PRACTITIONER

## 2024-09-02 RX ADMIN — LORAZEPAM 1 MG: 1 TABLET ORAL at 23:10

## 2024-09-02 RX ADMIN — PHENYTOIN SODIUM 100 MG: 100 CAPSULE ORAL at 08:38

## 2024-09-02 RX ADMIN — SODIUM CHLORIDE 100 ML/HR: 9 INJECTION, SOLUTION INTRAVENOUS at 23:10

## 2024-09-02 RX ADMIN — NEBIVOLOL 10 MG: 10 TABLET ORAL at 08:33

## 2024-09-02 RX ADMIN — INSULIN LISPRO 2 UNITS: 100 INJECTION, SOLUTION INTRAVENOUS; SUBCUTANEOUS at 11:46

## 2024-09-02 RX ADMIN — FOLIC ACID 1 MG: 1 TABLET ORAL at 08:33

## 2024-09-02 RX ADMIN — THIAMINE HYDROCHLORIDE 200 MG: 100 INJECTION, SOLUTION INTRAMUSCULAR; INTRAVENOUS at 20:41

## 2024-09-02 RX ADMIN — PHENYTOIN SODIUM 100 MG: 100 CAPSULE ORAL at 11:46

## 2024-09-02 RX ADMIN — INSULIN LISPRO 3 UNITS: 100 INJECTION, SOLUTION INTRAVENOUS; SUBCUTANEOUS at 18:24

## 2024-09-02 RX ADMIN — Medication 10 ML: at 08:38

## 2024-09-02 RX ADMIN — SODIUM CHLORIDE 100 ML/HR: 9 INJECTION, SOLUTION INTRAVENOUS at 04:38

## 2024-09-02 RX ADMIN — PHENYTOIN SODIUM 100 MG: 100 CAPSULE ORAL at 23:10

## 2024-09-02 RX ADMIN — PHENYTOIN SODIUM 100 MG: 100 CAPSULE ORAL at 17:16

## 2024-09-02 RX ADMIN — PANTOPRAZOLE SODIUM 40 MG: 40 TABLET, DELAYED RELEASE ORAL at 17:16

## 2024-09-02 RX ADMIN — SODIUM CHLORIDE 100 ML/HR: 9 INJECTION, SOLUTION INTRAVENOUS at 13:05

## 2024-09-02 RX ADMIN — THIAMINE HYDROCHLORIDE 200 MG: 100 INJECTION, SOLUTION INTRAMUSCULAR; INTRAVENOUS at 04:38

## 2024-09-02 RX ADMIN — OXYCODONE HYDROCHLORIDE 5 MG: 5 TABLET ORAL at 04:38

## 2024-09-02 RX ADMIN — LORAZEPAM 1 MG: 1 TABLET ORAL at 04:38

## 2024-09-02 RX ADMIN — THIAMINE HYDROCHLORIDE 200 MG: 100 INJECTION, SOLUTION INTRAMUSCULAR; INTRAVENOUS at 15:57

## 2024-09-02 RX ADMIN — LORAZEPAM 1 MG: 1 TABLET ORAL at 00:28

## 2024-09-02 RX ADMIN — LORAZEPAM 2 MG: 2 INJECTION INTRAMUSCULAR; INTRAVENOUS at 20:48

## 2024-09-02 RX ADMIN — PHENYTOIN SODIUM 100 MG: 100 CAPSULE ORAL at 00:28

## 2024-09-02 RX ADMIN — LOSARTAN POTASSIUM 50 MG: 50 TABLET, FILM COATED ORAL at 08:33

## 2024-09-02 RX ADMIN — Medication 10 ML: at 20:42

## 2024-09-02 RX ADMIN — INSULIN LISPRO 2 UNITS: 100 INJECTION, SOLUTION INTRAVENOUS; SUBCUTANEOUS at 20:41

## 2024-09-02 RX ADMIN — LORAZEPAM 1 MG: 1 TABLET ORAL at 08:33

## 2024-09-02 RX ADMIN — ENOXAPARIN SODIUM 40 MG: 100 INJECTION SUBCUTANEOUS at 15:56

## 2024-09-02 RX ADMIN — OXYCODONE HYDROCHLORIDE 5 MG: 5 TABLET ORAL at 00:28

## 2024-09-02 RX ADMIN — SERTRALINE 50 MG: 50 TABLET, FILM COATED ORAL at 08:33

## 2024-09-02 RX ADMIN — ASPIRIN 81 MG: 81 TABLET, CHEWABLE ORAL at 08:33

## 2024-09-02 RX ADMIN — PANTOPRAZOLE SODIUM 40 MG: 40 TABLET, DELAYED RELEASE ORAL at 08:34

## 2024-09-02 RX ADMIN — OXYCODONE HYDROCHLORIDE 5 MG: 5 TABLET ORAL at 08:34

## 2024-09-02 NOTE — NURSING NOTE
"Access follow up regarding ETOH. Last CIWA score of 4. Hospitalist had rounded prior to this RN and stated to patient that Ativan was being discontinued. When this RN entered room the pt was turned away and would not turn to speak with this RN. He stated he was \"fine\" and he had been provided with [facility] resources previously. He declined continued follows up. Access will sign off.   "

## 2024-09-02 NOTE — CASE MANAGEMENT/SOCIAL WORK
Continued Stay Note  [facility]     Patient Name: Bryant Edmonds  MRN: [id number]  Today's Date: 9/2/2024    Admit Date: 8/29/2024    Plan: Patient stating going to treatment to facility in [place] tomorrow 9/3   Discharge Plan       Row Name 09/02/24 1535       Plan    Plan Patient stating going to treatment to facility in [place] tomorrow 9/3    Plan Comments Message received from RN stating that patient has set up inpatient treatment in [place]. Patient is requesting that RN give medical records to facility. Patient states that facility will be providing transport tomorrow to facility, discharge paperwork to be given to patient at time of discharge.                   Discharge Codes    No documentation.                 Expected Discharge Date and Time       Expected Discharge Date Expected Discharge Time    Sep 3, 2024               Jael Uriostegui RN

## 2024-09-02 NOTE — PLAN OF CARE
Problem: Pain Acute  Goal: Acceptable Pain Control and Functional Ability  Outcome: Ongoing, Progressing  Intervention: Prevent or Manage Pain  Recent Flowsheet Documentation  Taken 9/2/2024 1818 by Liz Carranza RN  Medication Review/Management: medications reviewed  Taken 9/2/2024 1600 by Liz Carranza RN  Medication Review/Management: medications reviewed  Taken 9/2/2024 1357 by Liz Carranza RN  Medication Review/Management: medications reviewed  Taken 9/2/2024 1205 by Liz Carranza RN  Medication Review/Management: medications reviewed  Taken 9/2/2024 1000 by Liz Carranza RN  Medication Review/Management: medications reviewed  Taken 9/2/2024 0830 by Liz Carranza RN  Medication Review/Management: medications reviewed  Intervention: Optimize Psychosocial Wellbeing  Recent Flowsheet Documentation  Taken 9/2/2024 0830 by Liz Carranza RN  Diversional Activities: television     Problem: Alcohol Withdrawal  Goal: Alcohol Withdrawal Symptom Control  Outcome: Ongoing, Progressing     Problem: Acute Neurologic Deterioration (Alcohol Withdrawal)  Goal: Optimal Neurologic Function  Outcome: Ongoing, Progressing     Problem: Substance Misuse (Alcohol Withdrawal)  Goal: Readiness for Change Identified  Outcome: Ongoing, Progressing  Intervention: Promote Psychosocial Wellbeing  Recent Flowsheet Documentation  Taken 9/2/2024 0830 by Liz Carranza RN  Family/Support System Care: self-care encouraged     Problem: Adult Inpatient Plan of Care  Goal: Plan of Care Review  Outcome: Ongoing, Progressing  Flowsheets (Taken 9/2/2024 1825)  Outcome Evaluation: VSS Patient is up ad merced  Goal: Patient-Specific Goal (Individualized)  Outcome: Ongoing, Progressing  Goal: Absence of Hospital-Acquired Illness or Injury  Outcome: Ongoing, Progressing  Intervention: Identify and Manage Fall Risk  Recent Flowsheet Documentation  Taken 9/2/2024 1818 by Liz Carranza RN  Safety Promotion/Fall Prevention:   clutter free environment  maintained   activity supervised  Taken 9/2/2024 1600 by Liz Carranza RN  Safety Promotion/Fall Prevention:   clutter free environment maintained   activity supervised  Taken 9/2/2024 1357 by Liz Carranza RN  Safety Promotion/Fall Prevention:   clutter free environment maintained   activity supervised  Taken 9/2/2024 1205 by Liz Carranza RN  Safety Promotion/Fall Prevention:   clutter free environment maintained   activity supervised  Taken 9/2/2024 1000 by Liz Carranza RN  Safety Promotion/Fall Prevention:   clutter free environment maintained   activity supervised  Taken 9/2/2024 0830 by Liz Carranza RN  Safety Promotion/Fall Prevention:   clutter free environment maintained   activity supervised  Intervention: Prevent Skin Injury  Recent Flowsheet Documentation  Taken 9/2/2024 0830 by Liz Carranza RN  Body Position: position changed independently  Intervention: Prevent and Manage VTE (Venous Thromboembolism) Risk  Recent Flowsheet Documentation  Taken 9/2/2024 1357 by Liz Carranza RN  Activity Management: ambulated in room  Taken 9/2/2024 0830 by Liz Carranza RN  Activity Management: ambulated in room  VTE Prevention/Management: other (see comments)  Range of Motion: active ROM (range of motion) encouraged  Intervention: Prevent Infection  Recent Flowsheet Documentation  Taken 9/2/2024 1818 by Liz Carranza RN  Infection Prevention: single patient room provided  Taken 9/2/2024 1600 by Liz Carranza RN  Infection Prevention: single patient room provided  Taken 9/2/2024 1357 by Liz Carranza RN  Infection Prevention: single patient room provided  Taken 9/2/2024 1205 by Liz Carranza RN  Infection Prevention: single patient room provided  Taken 9/2/2024 1000 by Liz Carranza RN  Infection Prevention: single patient room provided  Taken 9/2/2024 0830 by Liz Carranza RN  Infection Prevention: single patient room provided  Goal: Optimal Comfort and Wellbeing  Outcome: Ongoing,  Progressing  Intervention: Provide Person-Centered Care  Recent Flowsheet Documentation  Taken 9/2/2024 0830 by iLz Carranza RN  Trust Relationship/Rapport:   care explained   choices provided  Goal: Readiness for Transition of Care  Outcome: Ongoing, Progressing     Problem: Fall Injury Risk  Goal: Absence of Fall and Fall-Related Injury  Outcome: Ongoing, Progressing  Intervention: Identify and Manage Contributors  Recent Flowsheet Documentation  Taken 9/2/2024 1818 by Liz Carranza RN  Medication Review/Management: medications reviewed  Taken 9/2/2024 1600 by Liz Carranza RN  Medication Review/Management: medications reviewed  Taken 9/2/2024 1357 by Liz Carranza RN  Medication Review/Management: medications reviewed  Taken 9/2/2024 1205 by Liz Carranza RN  Medication Review/Management: medications reviewed  Taken 9/2/2024 1000 by Liz Carranza RN  Medication Review/Management: medications reviewed  Taken 9/2/2024 0830 by Liz Carranza RN  Medication Review/Management: medications reviewed  Intervention: Promote Injury-Free Environment  Recent Flowsheet Documentation  Taken 9/2/2024 1818 by Liz Carranza RN  Safety Promotion/Fall Prevention:   clutter free environment maintained   activity supervised  Taken 9/2/2024 1600 by Liz Carranza RN  Safety Promotion/Fall Prevention:   clutter free environment maintained   activity supervised  Taken 9/2/2024 1357 by Liz Carranza RN  Safety Promotion/Fall Prevention:   clutter free environment maintained   activity supervised  Taken 9/2/2024 1205 by Liz Carranza RN  Safety Promotion/Fall Prevention:   clutter free environment maintained   activity supervised  Taken 9/2/2024 1000 by Liz Carranza RN  Safety Promotion/Fall Prevention:   clutter free environment maintained   activity supervised  Taken 9/2/2024 0830 by Liz Carranza RN  Safety Promotion/Fall Prevention:   clutter free environment maintained   activity supervised     Problem: Behavioral Health  Comorbidity  Goal: Maintenance of Behavioral Health Symptom Control  Outcome: Ongoing, Progressing  Intervention: Maintain Behavioral Health Symptom Control  Recent Flowsheet Documentation  Taken 9/2/2024 1818 by Liz Carranza RN  Medication Review/Management: medications reviewed  Taken 9/2/2024 1600 by Liz Carranza RN  Medication Review/Management: medications reviewed  Taken 9/2/2024 1357 by Liz Carranza RN  Medication Review/Management: medications reviewed  Taken 9/2/2024 1205 by Liz Carranza RN  Medication Review/Management: medications reviewed  Taken 9/2/2024 1000 by Liz Carranza RN  Medication Review/Management: medications reviewed  Taken 9/2/2024 0830 by Liz Carranza RN  Medication Review/Management: medications reviewed     Problem: Hypertension Comorbidity  Goal: Blood Pressure in Desired Range  Outcome: Ongoing, Progressing  Intervention: Maintain Blood Pressure Management  Recent Flowsheet Documentation  Taken 9/2/2024 1818 by Liz Carranza RN  Medication Review/Management: medications reviewed  Taken 9/2/2024 1600 by Liz Carranza RN  Medication Review/Management: medications reviewed  Taken 9/2/2024 1357 by Liz Carranza RN  Medication Review/Management: medications reviewed  Taken 9/2/2024 1205 by Liz Carranza RN  Medication Review/Management: medications reviewed  Taken 9/2/2024 1000 by Liz Carranza RN  Medication Review/Management: medications reviewed  Taken 9/2/2024 0830 by Liz Carranza RN  Medication Review/Management: medications reviewed     Problem: Seizure Disorder Comorbidity  Goal: Maintenance of Seizure Control  Outcome: Ongoing, Progressing   Goal Outcome Evaluation:              Outcome Evaluation: VSS Patient is up ad merced

## 2024-09-02 NOTE — PROGRESS NOTES
Name: Bryant Edmonds ADMIT: 2024   : 1980  PCP: Provider, No Known    MRN: [id number] LOS: 3 days   AGE/SEX: 44 y.o. male  ROOM: E564/     Subjective     States he feels better.   CT chest negative for rib fracture.     Objective   Objective   Vital Signs  Temp:  [98 °F (36.7 °C)-98.2 °F (36.8 °C)] 98.1 °F (36.7 °C)  Heart Rate:  [61-83] 61  Resp:  [16-20] 18  BP: (116-150)/(61-89) 127/69  SpO2:  [98 %-99 %] 98 %  on   ;   Device (Oxygen Therapy): room air  Body mass index is 28.06 kg/m².  Physical Exam  Constitutional:       General: He is not in acute distress.  HENT:      Head: Normocephalic and atraumatic.   Cardiovascular:      Rate and Rhythm: Normal rate and regular rhythm.   Pulmonary:      Effort: Pulmonary effort is normal. No respiratory distress.   Abdominal:      General: There is no distension.      Palpations: Abdomen is soft.      Tenderness: There is no abdominal tenderness.   Musculoskeletal:      Comments: Chest wall tender to palpation around the xyphoid process    Neurological:      General: No focal deficit present.      Mental Status: He is alert and oriented to person, place, and time.       Results Review     I reviewed the patient's new clinical results.  Results from last 7 days   Lab Units 24  0453 09/01/24  0556 24  0558 24  0456   WBC 10*3/mm3 4.58 4.70 4.06 3.81   HEMOGLOBIN g/dL 16.2 15.8 15.7 15.4   PLATELETS 10*3/mm3 148 143 151 161     Results from last 7 days   Lab Units 24  0453 24  0556 24  0559 24  0456   SODIUM mmol/L 137 136 138 136   POTASSIUM mmol/L 4.0 4.2 4.0 3.6   CHLORIDE mmol/L 105 103 103 103   CO2 mmol/L 22.0 22.0 25.5 21.2*   BUN mg/dL 11 12 11 9   CREATININE mg/dL 0.75* 0.62* 0.76 0.67*   GLUCOSE mg/dL 131* 162* 155* 182*   Estimated Creatinine Clearance: 138 mL/min (A) (by C-G formula based on SCr of 0.75 mg/dL (L)).  Results from last 7 days   Lab Units 24  0453 24  0556 24  0559  "08/30/24  0456   ALBUMIN g/dL 4.0 4.1 4.0 3.9   BILIRUBIN mg/dL 0.4 0.4 0.5 0.7   ALK PHOS U/L 61 60 59 60   AST (SGOT) U/L 30 33 30 48*   ALT (SGPT) U/L 54* 58* 66* 81*     Results from last 7 days   Lab Units 09/02/24  0453 09/01/24  0556 08/31/24  0930 08/31/24  0559 08/30/24  0456 08/29/24  0520 08/28/24  2105   CALCIUM mg/dL 9.0 8.9  --  9.1 8.5*   < > 8.5*   ALBUMIN g/dL 4.0 4.1  --  4.0 3.9  --  4.5   MAGNESIUM mg/dL  --   --  1.9  --   --   --  2.0    < > = values in this interval not displayed.     No results found for: \"COVID19\"  Hemoglobin A1C   Date/Time Value Ref Range Status   08/31/2024 0558 7.50 (H) 4.80 - 5.60 % Final     Glucose   Date/Time Value Ref Range Status   09/02/2024 1107 186 (H) 70 - 130 mg/dL Final   09/02/2024 0541 125 70 - 130 mg/dL Final   09/01/2024 2043 191 (H) 70 - 130 mg/dL Final   09/01/2024 1610 124 70 - 130 mg/dL Final   09/01/2024 1105 171 (H) 70 - 130 mg/dL Final   09/01/2024 0538 159 (H) 70 - 130 mg/dL Final   08/31/2024 2050 164 (H) 70 - 130 mg/dL Final     No results found for this or any previous visit.      CT Chest Without Contrast Diagnostic  Narrative: CT CHEST WO CONTRAST DIAGNOSTIC-     DATE OF EXAM: 9/1/2024 11:11 AM     INDICATION: Chest pain. Evaluate rib fractures. Alcohol withdrawal.     COMPARISON: Chest radiographs 8/28/2024 and 6/29/2024. CT chest  6/29/2024.     TECHNIQUE: Multiple contiguous axial images were acquired through the  chest without the intravenous administration of contrast. Reformatted  coronal and sagittal sequences were also reviewed. Radiation dose  reduction techniques were utilized, including automated exposure control  and exposure modulation based on body size.     FINDINGS:  Low lung volumes with mild bilateral dependent atelectasis and mild  multifocal bibasilar subsegmental atelectasis and/or scarring. Lungs  otherwise clear. The central airways are widely patent. No pneumothorax  or pleural effusion.     The heart and great vessels " of the chest are normal in size. Suspected  mild calcified coronary artery disease. Trace pericardial fluid. No  pathologically enlarged intrathoracic lymph nodes are identified.     Limited noncontrast CT imaging of the upper abdomen is unremarkable.     Bilateral gynecomastia. No acute osseous abnormality or concerning  osseous lesion.     Impression: No acute intrathoracic abnormality.     This report was finalized on 9/1/2024 11:53 AM by Walt Campuzano MD on  Workstation: [id number]       Scheduled Medications  aspirin, 81 mg, Oral, Daily  enoxaparin, 40 mg, Subcutaneous, Q24H  folic acid, 1 mg, Oral, Daily  insulin lispro, 2-7 Units, Subcutaneous, 4x Daily AC & at Bedtime  losartan, 50 mg, Oral, Q24H  nebivolol, 10 mg, Oral, Daily  pantoprazole, 40 mg, Oral, Daily With Breakfast & Dinner  phenytoin ER, 100 mg, Oral, Q6H  sertraline, 50 mg, Oral, Daily  sodium chloride, 10 mL, Intravenous, Q12H  thiamine (B-1) IV, 200 mg, Intravenous, Q8H   Followed by  [START ON 9/3/2024] thiamine, 100 mg, Oral, Daily    Infusions  sodium chloride, 100 mL/hr, Last Rate: 100 mL/hr (09/02/24 0438)    Diet  Diet: Diabetic; Consistent Carbohydrate; Fluid Consistency: Thin (IDDSI 0)       Assessment/Plan     Active Hospital Problems    Diagnosis  POA    **Alcohol withdrawal [F10.939]  Yes    ROSALINO (generalized anxiety disorder) [F41.1]  Yes    PTSD (post-traumatic stress disorder) [F43.10]  Yes    TBI (traumatic brain injury) [S06.9XAA]  Yes    Primary hypertension [I10]  Yes    Coronary artery disease involving native coronary artery with unstable angina pectoris [I25.110]  Yes    Type 2 diabetes mellitus with hyperglycemia, with long-term current use of insulin [E11.65, Z79.4]  Not Applicable    Seizure disorder [G40.909]  Yes      Resolved Hospital Problems   No resolved problems to display.       44 y.o. male admitted with Alcohol withdrawal.    Alcohol intoxication  History of alcohol withdrawal seizures  [facility] protocol with  phenobaribtal  On IV thiamine and folic acid     Chest wall tenderness  Troponin and EKG wnl  Reports multiple falls  Obtain CT chest without contrast to evaluate for rib fractures- negative     Coronary artery disease  Hypertension  On aspirin, aspirin, statin, Bystolic, losartan     Type 2 diabetes  SSI     Generalized anxiety disorder  SSRI       Lovenox 40 mg SC daily for DVT prophylaxis.  Full code.  Discussed with patient and nursing staff.  Anticipate discharge home tomorrow      Temo Monique MD  [facility]  09/02/24  12:52 EDT

## 2024-09-02 NOTE — PLAN OF CARE
Goal Outcome Evaluation:  Plan of Care Reviewed With: patient           Outcome Evaluation: vss,a/o*4, room air, up ad merced,CIWA protocol in place, pain managed with prn pain medications per MD order,plan of care continued this shift.

## 2024-09-03 LAB
ANION GAP SERPL CALCULATED.3IONS-SCNC: 7 MMOL/L (ref 5–15)
BUN SERPL-MCNC: 14 MG/DL (ref 6–20)
BUN/CREAT SERPL: 17.7 (ref 7–25)
CALCIUM SPEC-SCNC: 9.1 MG/DL (ref 8.6–10.5)
CHLORIDE SERPL-SCNC: 102 MMOL/L (ref 98–107)
CO2 SERPL-SCNC: 25 MMOL/L (ref 22–29)
CREAT SERPL-MCNC: 0.79 MG/DL (ref 0.76–1.27)
DEPRECATED RDW RBC AUTO: 45.4 FL (ref 37–54)
EGFRCR SERPLBLD CKD-EPI 2021: 111 ML/MIN/1.73
ERYTHROCYTE [DISTWIDTH] IN BLOOD BY AUTOMATED COUNT: 13.2 % (ref 12.3–15.4)
GLUCOSE BLDC GLUCOMTR-MCNC: 125 MG/DL (ref 70–130)
GLUCOSE BLDC GLUCOMTR-MCNC: 131 MG/DL (ref 70–130)
GLUCOSE BLDC GLUCOMTR-MCNC: 131 MG/DL (ref 70–130)
GLUCOSE BLDC GLUCOMTR-MCNC: 168 MG/DL (ref 70–130)
GLUCOSE SERPL-MCNC: 114 MG/DL (ref 65–99)
HCT VFR BLD AUTO: 47.2 % (ref 37.5–51)
HGB BLD-MCNC: 16 G/DL (ref 13–17.7)
MCH RBC QN AUTO: 31.9 PG (ref 26.6–33)
MCHC RBC AUTO-ENTMCNC: 33.9 G/DL (ref 31.5–35.7)
MCV RBC AUTO: 94 FL (ref 79–97)
PLATELET # BLD AUTO: 158 10*3/MM3 (ref 140–450)
PMV BLD AUTO: 12.2 FL (ref 6–12)
POTASSIUM SERPL-SCNC: 4.3 MMOL/L (ref 3.5–5.2)
RBC # BLD AUTO: 5.02 10*6/MM3 (ref 4.14–5.8)
SODIUM SERPL-SCNC: 134 MMOL/L (ref 136–145)
WBC NRBC COR # BLD AUTO: 6.7 10*3/MM3 (ref 3.4–10.8)

## 2024-09-03 PROCEDURE — 82948 REAGENT STRIP/BLOOD GLUCOSE: CPT

## 2024-09-03 PROCEDURE — 25010000002 ONDANSETRON PER 1 MG: Performed by: INTERNAL MEDICINE

## 2024-09-03 PROCEDURE — 85027 COMPLETE CBC AUTOMATED: CPT | Performed by: STUDENT IN AN ORGANIZED HEALTH CARE EDUCATION/TRAINING PROGRAM

## 2024-09-03 PROCEDURE — 63710000001 INSULIN LISPRO (HUMAN) PER 5 UNITS: Performed by: NURSE PRACTITIONER

## 2024-09-03 PROCEDURE — 25010000002 ENOXAPARIN PER 10 MG: Performed by: STUDENT IN AN ORGANIZED HEALTH CARE EDUCATION/TRAINING PROGRAM

## 2024-09-03 PROCEDURE — 80048 BASIC METABOLIC PNL TOTAL CA: CPT | Performed by: STUDENT IN AN ORGANIZED HEALTH CARE EDUCATION/TRAINING PROGRAM

## 2024-09-03 RX ORDER — ONDANSETRON 2 MG/ML
4 INJECTION INTRAMUSCULAR; INTRAVENOUS EVERY 4 HOURS PRN
Status: DISCONTINUED | OUTPATIENT
Start: 2024-09-03 | End: 2024-09-04 | Stop reason: HOSPADM

## 2024-09-03 RX ORDER — ATORVASTATIN CALCIUM 20 MG/1
20 TABLET, FILM COATED ORAL NIGHTLY
Status: DISCONTINUED | OUTPATIENT
Start: 2024-09-03 | End: 2024-09-04 | Stop reason: HOSPADM

## 2024-09-03 RX ORDER — FAMOTIDINE 20 MG/1
20 TABLET, FILM COATED ORAL ONCE
Status: DISCONTINUED | OUTPATIENT
Start: 2024-09-03 | End: 2024-09-04 | Stop reason: HOSPADM

## 2024-09-03 RX ORDER — FAMOTIDINE 20 MG/1
20 TABLET, FILM COATED ORAL 2 TIMES DAILY PRN
Status: DISCONTINUED | OUTPATIENT
Start: 2024-09-03 | End: 2024-09-04 | Stop reason: HOSPADM

## 2024-09-03 RX ORDER — HYDROXYZINE PAMOATE 25 MG/1
25 CAPSULE ORAL NIGHTLY
Status: DISCONTINUED | OUTPATIENT
Start: 2024-09-03 | End: 2024-09-04 | Stop reason: HOSPADM

## 2024-09-03 RX ADMIN — INSULIN LISPRO 2 UNITS: 100 INJECTION, SOLUTION INTRAVENOUS; SUBCUTANEOUS at 16:56

## 2024-09-03 RX ADMIN — PHENYTOIN SODIUM 100 MG: 100 CAPSULE ORAL at 11:48

## 2024-09-03 RX ADMIN — PANTOPRAZOLE SODIUM 40 MG: 40 TABLET, DELAYED RELEASE ORAL at 17:07

## 2024-09-03 RX ADMIN — PANTOPRAZOLE SODIUM 40 MG: 40 TABLET, DELAYED RELEASE ORAL at 08:59

## 2024-09-03 RX ADMIN — ASPIRIN 81 MG: 81 TABLET, CHEWABLE ORAL at 08:59

## 2024-09-03 RX ADMIN — LORAZEPAM 2 MG: 1 TABLET ORAL at 01:06

## 2024-09-03 RX ADMIN — ATORVASTATIN CALCIUM 20 MG: 20 TABLET, FILM COATED ORAL at 23:21

## 2024-09-03 RX ADMIN — HYDROXYZINE PAMOATE 25 MG: 25 CAPSULE ORAL at 23:21

## 2024-09-03 RX ADMIN — NEBIVOLOL 10 MG: 10 TABLET ORAL at 08:59

## 2024-09-03 RX ADMIN — Medication 10 ML: at 23:22

## 2024-09-03 RX ADMIN — Medication 100 MG: at 08:59

## 2024-09-03 RX ADMIN — Medication 10 ML: at 09:01

## 2024-09-03 RX ADMIN — PHENYTOIN SODIUM 100 MG: 100 CAPSULE ORAL at 17:08

## 2024-09-03 RX ADMIN — ENOXAPARIN SODIUM 40 MG: 100 INJECTION SUBCUTANEOUS at 14:55

## 2024-09-03 RX ADMIN — SERTRALINE 50 MG: 50 TABLET, FILM COATED ORAL at 08:59

## 2024-09-03 RX ADMIN — PHENYTOIN SODIUM 100 MG: 100 CAPSULE ORAL at 06:41

## 2024-09-03 RX ADMIN — ONDANSETRON 4 MG: 2 INJECTION INTRAMUSCULAR; INTRAVENOUS at 12:48

## 2024-09-03 RX ADMIN — LOSARTAN POTASSIUM 50 MG: 50 TABLET, FILM COATED ORAL at 08:59

## 2024-09-03 RX ADMIN — ONDANSETRON 4 MG: 2 INJECTION INTRAMUSCULAR; INTRAVENOUS at 16:56

## 2024-09-03 RX ADMIN — PHENYTOIN SODIUM 100 MG: 100 CAPSULE ORAL at 23:21

## 2024-09-03 RX ADMIN — FOLIC ACID 1 MG: 1 TABLET ORAL at 08:59

## 2024-09-03 NOTE — PLAN OF CARE
Problem: Adult Inpatient Plan of Care  Goal: Absence of Hospital-Acquired Illness or Injury  Intervention: Identify and Manage Fall Risk  Recent Flowsheet Documentation  Taken 9/3/2024 0400 by Veronica Avila RN  Safety Promotion/Fall Prevention: safety round/check completed  Taken 9/3/2024 0205 by Veronica Avila RN  Safety Promotion/Fall Prevention: safety round/check completed  Taken 9/3/2024 0000 by Veronica Avila RN  Safety Promotion/Fall Prevention: safety round/check completed  Taken 9/2/2024 2200 by Veronica Avila RN  Safety Promotion/Fall Prevention: safety round/check completed  Taken 9/2/2024 2041 by Veronica Avila RN  Safety Promotion/Fall Prevention: safety round/check completed   Goal Outcome Evaluation:

## 2024-09-03 NOTE — PROGRESS NOTES
Access gave pt info for [facility] tx in [place]. Pt states he is set to go to a facility in [facility]. DINESHWA is a 3.Access will continue to follow.

## 2024-09-03 NOTE — PROGRESS NOTES
[facility] Medicine Services  PROGRESS NOTE    Patient Name: Bryant Edmonds  : 1980  MRN: [id number]    Date of Admission: 2024  Primary Care Physician: Provider, No Known    Subjective   Subjective     CC:  Follow-up alcohol withdrawal    Subjective: Patient is having some dizziness and reports some nausea this morning.  He does not feel ready to discharge.  He believes he will be ready to go to rehab tomorrow.  He is planning to go to [place].  I discussed with his family whether it was realistic that he could get to [place].  They said they will talk to him further and make discussions about whether they can find some sort of transportation.  I also suggested we give him some local rehab options to see if these would be reasonable.  I reviewed patient's laboratory studies and vital signs and findings thus far.  I discussed his treatment thus far.  Patient seemed to have good understanding of his treatment plan.    Review of Systems  No current fevers or chills  No current shortness of breath or cough  No current chest pain or palpitations       Objective   Objective     Vital Signs:   Temp:  [97.2 °F (36.2 °C)-98.1 °F (36.7 °C)] 98 °F (36.7 °C)  Heart Rate:  [66-75] 66  Resp:  [16] 16  BP: (129-169)/(64-89) 129/68        Physical Exam:  Constitutional:Awake, alert  HENT: NCAT, mucous membranes moist, neck supple  Respiratory: No cough or wheezes, normal respirations, nonlabored breathing   Cardiovascular: Pulse rate is normal, palpable radial pulses  Gastrointestinal:  soft, nontender, nondistended  Musculoskeletal: Normal musculature for age, no lower extremity edema  Psychiatric: Appropriate affect, cooperative, conversational  Neurologic: No slurred speech or facial droop, follows commands  Skin: No rashes or jaundice, warm      Results Reviewed:  Results from last 7 days   Lab Units 24  0735 24  0453 24  0556   WBC 10*3/mm3 6.70 4.58 4.70   HEMOGLOBIN g/dL 16.0 16.2  15.8   HEMATOCRIT % 47.2 47.3 46.5   PLATELETS 10*3/mm3 158 148 143     Results from last 7 days   Lab Units 09/03/24  0735 09/02/24  0453 09/01/24  0556 08/31/24  1138 08/31/24  0930 08/31/24  0559 08/29/24  0520 08/28/24  2240 08/28/24  2105 08/28/24 2001   SODIUM mmol/L 134* 137 136  --   --  138   < >  --    < >  --    POTASSIUM mmol/L 4.3 4.0 4.2  --   --  4.0   < >  --    < >  --    CHLORIDE mmol/L 102 105 103  --   --  103   < >  --    < >  --    CO2 mmol/L 25.0 22.0 22.0  --   --  25.5   < >  --    < >  --    BUN mg/dL 14 11 12  --   --  11   < >  --    < >  --    CREATININE mg/dL 0.79 0.75* 0.62*  --   --  0.76   < >  --    < >  --    GLUCOSE mg/dL 114* 131* 162*  --   --  155*   < >  --    < >  --    CALCIUM mg/dL 9.1 9.0 8.9  --   --  9.1   < >  --    < >  --    ALK PHOS U/L  --  61 60  --   --  59   < >  --    < >  --    ALT (SGPT) U/L  --  54* 58*  --   --  66*   < >  --    < >  --    AST (SGOT) U/L  --  30 33  --   --  30   < >  --    < >  --    HSTROP T ng/L  --   --   --  20 20  --   --  24*   < >  --    PROBNP pg/mL  --   --   --   --   --   --   --   --   --  <36.0    < > = values in this interval not displayed.     Estimated Creatinine Clearance: 131.1 mL/min (by C-G formula based on SCr of 0.79 mg/dL).    Results for orders placed during the hospital encounter of 06/29/24    Adult Transthoracic Echo Complete With Contrast if Necessary Per Protocol    Interpretation Summary    Left ventricular ejection fraction appears to be 51 - 55%.    The right ventricular cavity is borderline dilated.    Estimated right ventricular systolic pressure from tricuspid regurgitation is normal (<35 mmHg).      I have reviewed the medications:  Scheduled Meds:aspirin, 81 mg, Oral, Daily  enoxaparin, 40 mg, Subcutaneous, Q24H  famotidine, 20 mg, Oral, Once  folic acid, 1 mg, Oral, Daily  insulin lispro, 2-7 Units, Subcutaneous, 4x Daily AC & at Bedtime  losartan, 50 mg, Oral, Q24H  nebivolol, 10 mg, Oral,  Daily  pantoprazole, 40 mg, Oral, Daily With Breakfast & Dinner  phenytoin ER, 100 mg, Oral, Q6H  sertraline, 50 mg, Oral, Daily  sodium chloride, 10 mL, Intravenous, Q12H  thiamine, 100 mg, Oral, Daily      Continuous Infusions:   PRN Meds:.  acetaminophen **OR** acetaminophen **OR** acetaminophen    senna-docusate sodium **AND** polyethylene glycol **AND** bisacodyl **AND** bisacodyl    dextrose    dextrose    famotidine    glucagon (human recombinant)    Magnesium Standard Dose Replacement - Follow Nurse / BPA Driven Protocol    nitroglycerin    ondansetron    [COMPLETED] Insert Peripheral IV **AND** sodium chloride    sodium chloride    sodium chloride    Assessment & Plan   Assessment & Plan     Active Hospital Problems    Diagnosis  POA    **Alcohol withdrawal [F10.939]  Yes    ROSALINO (generalized anxiety disorder) [F41.1]  Yes    PTSD (post-traumatic stress disorder) [F43.10]  Yes    TBI (traumatic brain injury) [S06.9XAA]  Yes    Primary hypertension [I10]  Yes    Coronary artery disease involving native coronary artery with unstable angina pectoris [I25.110]  Yes    Type 2 diabetes mellitus with hyperglycemia, with long-term current use of insulin [E11.65, Z79.4]  Not Applicable    Seizure disorder [G40.909]  Yes      Resolved Hospital Problems   No resolved problems to display.        Brief Hospital Course to date:  Bryant Edmonds is a 44 y.o. male presents the hospital with alcohol withdrawal    Discussion/plan for today: All medical problems are new under my management today.  Alcohol withdrawal seems to be resolved.  Lorazepam discontinued.  Long half-life of phenobarbital should allow for maintained withdrawal.  Zofran added as needed for nausea.  No witnessed vomiting.  Patient tolerating oral intake.  IV fluid discontinued and patient monitored off IV fluid.  Blood pressure currently well-controlled.  Recent laboratory studies are very reassuring.  I have requested that case management and access team  to provide patient with a local resources for sobriety including local rehabs as patient plans to go to a rehab in [place] however I am uncertain if this is realistic as family has not yet looked into ways for transportation.  I have asked family to discuss with patient realistic plans for discharge and realistic plans for sobriety.  Case discussed with nursing as well.  Glucose reviewed and continue correction insulin.  Faisal reviewed and it is noted that patient received prescription Norco, Xanax, and pregabalin for 1 week back on 8/21.  At this point I feel the risks of these medications outweigh the benefits.  I would recommend patient discontinue these medications unless he has a long-term psychiatrist recommending them long-term for his acute issues as if he starts the short-term and does not have a continuing provider he will be at high risk for withdrawal in the future.  Patient is understanding of my recommendations.  Anticipate discharge tomorrow as patient is reaching stability pending no further setbacks and discharge plan.    Alcohol intoxication  History of alcohol withdrawal seizures  CIWA protocol with phenobaribtal  On IV thiamine and folic acid      Chest wall tenderness  Troponin and EKG wnl  Reports multiple falls  Obtain CT chest without contrast to evaluate for rib fractures- negative     Coronary artery disease  Hypertension  On aspirin, aspirin, statin, Bystolic, losartan     Type 2 diabetes  SSI     Generalized anxiety disorder  SSRI        Lovenox 40 mg SC daily for DVT prophylaxis.  Full code.  Discussed with patient and nursing staff.  Anticipate discharge home tomorrow    CODE STATUS:   Code Status and Medical Interventions: CPR (Attempt to Resuscitate); Full Support   Ordered at: 08/29/24 0433     Code Status (Patient has no pulse and is not breathing):    CPR (Attempt to Resuscitate)     Medical Interventions (Patient has pulse or is breathing):    Full Support       Rambo Galeas  MD [facility]  09/03/24

## 2024-09-03 NOTE — NURSING NOTE
"A&Ox4  Pleasant & cooperative but asking for Ativan frequently, stating \"I know I can have it every 2 hours\"  Complaints of ongoing N/V, tremors, sweats, headache. See CIWA charting. Restless throughout night and pacing in the room. Voicing aggravation w/ MD for \"stopping my ativan cold turkey, I have taken it QID for 7 years\"  Ad merced & indep  Cont IVF  Plan to d/c today  "

## 2024-09-04 ENCOUNTER — READMISSION MANAGEMENT (OUTPATIENT)
Dept: CALL CENTER | Facility: HOSPITAL | Age: 44
End: 2024-09-04
Payer: COMMERCIAL

## 2024-09-04 VITALS
OXYGEN SATURATION: 99 % | DIASTOLIC BLOOD PRESSURE: 90 MMHG | BODY MASS INDEX: 28 KG/M2 | HEART RATE: 69 BPM | SYSTOLIC BLOOD PRESSURE: 149 MMHG | TEMPERATURE: 98.1 F | RESPIRATION RATE: 16 BRPM | HEIGHT: 70 IN | WEIGHT: 195.55 LBS

## 2024-09-04 PROBLEM — F10.939 ALCOHOL WITHDRAWAL: Status: RESOLVED | Noted: 2024-08-29 | Resolved: 2024-09-04

## 2024-09-04 PROBLEM — F10.239 ALCOHOL DEPENDENCE WITH WITHDRAWAL: Status: ACTIVE | Noted: 2024-09-04

## 2024-09-04 LAB
GLUCOSE BLDC GLUCOMTR-MCNC: 136 MG/DL (ref 70–130)
GLUCOSE BLDC GLUCOMTR-MCNC: 190 MG/DL (ref 70–130)

## 2024-09-04 PROCEDURE — 63710000001 INSULIN LISPRO (HUMAN) PER 5 UNITS: Performed by: NURSE PRACTITIONER

## 2024-09-04 PROCEDURE — 82948 REAGENT STRIP/BLOOD GLUCOSE: CPT

## 2024-09-04 RX ORDER — FAMOTIDINE 10 MG
10 TABLET ORAL 2 TIMES DAILY PRN
Start: 2024-09-04

## 2024-09-04 RX ORDER — METFORMIN HCL 500 MG
500 TABLET, EXTENDED RELEASE 24 HR ORAL
Qty: 30 TABLET | Refills: 0 | Status: SHIPPED | OUTPATIENT
Start: 2024-09-04

## 2024-09-04 RX ORDER — ACETAMINOPHEN 325 MG/1
650 TABLET ORAL EVERY 6 HOURS PRN
Start: 2024-09-04

## 2024-09-04 RX ADMIN — NEBIVOLOL 10 MG: 10 TABLET ORAL at 08:24

## 2024-09-04 RX ADMIN — PHENYTOIN SODIUM 100 MG: 100 CAPSULE ORAL at 11:35

## 2024-09-04 RX ADMIN — Medication 100 MG: at 08:24

## 2024-09-04 RX ADMIN — SERTRALINE 50 MG: 50 TABLET, FILM COATED ORAL at 08:24

## 2024-09-04 RX ADMIN — ASPIRIN 81 MG: 81 TABLET, CHEWABLE ORAL at 08:24

## 2024-09-04 RX ADMIN — LOSARTAN POTASSIUM 50 MG: 50 TABLET, FILM COATED ORAL at 08:24

## 2024-09-04 RX ADMIN — INSULIN LISPRO 2 UNITS: 100 INJECTION, SOLUTION INTRAVENOUS; SUBCUTANEOUS at 12:26

## 2024-09-04 RX ADMIN — FOLIC ACID 1 MG: 1 TABLET ORAL at 08:24

## 2024-09-04 RX ADMIN — PANTOPRAZOLE SODIUM 40 MG: 40 TABLET, DELAYED RELEASE ORAL at 10:29

## 2024-09-04 RX ADMIN — PHENYTOIN SODIUM 100 MG: 100 CAPSULE ORAL at 06:20

## 2024-09-04 RX ADMIN — Medication 10 ML: at 08:25

## 2024-09-04 NOTE — NURSING NOTE
Received call from an out of town friend Henry Jaramillo who he refers to as his wife but is not related expressing concerns about his safety. Two nurses assessed using direct questions asking if he had any thoughts or intent to harm himself. Patient denied all accounts and reported he wanted to leave the facility and find a alcohol rehab placement.

## 2024-09-04 NOTE — DISCHARGE PLACEMENT REQUEST
"Alfreda Cabrera (44 y.o. Male)       Date of Birth   1980    Social Security Number       Address   [address]  [place] [address]    Home Phone   [phone]    MRN   [id number]       Orthodox   None    Marital Status   Single                            Admission Date   8/29/24    Admission Type   Emergency    Admitting Provider   Glen Lund MD    Attending Provider   Rambo Niño MD    Department, Room/Bed   [address], E564/1       Discharge Date       Discharge Disposition   Home or Self Care    Discharge Destination                                 Attending Provider: Rambo Niño MD    Allergies: No Known Allergies    Isolation: None   Infection: None   Code Status: CPR    Ht: 177.8 cm (70\")   Wt: 88.7 kg (195 lb 8.8 oz)    Admission Cmt: None   Principal Problem: Alcohol withdrawal [F10.939]                   Active Insurance as of 8/29/2024       Primary Coverage       Payor Plan Insurance Group Employer/Plan Group    [employer] [place] [employer] [id number]       Payor Plan Address Payor Plan Phone Number Payor Plan Fax Number Effective Dates    [address] [phone]  2/1/2024 - None Entered    [facility] [id number]         Subscriber Name Subscriber Birth Date Member ID       ALFREDA CABRERA 1980 [id number]                     Emergency Contacts        (Rel.) Home Phone Work Phone Mobile Phone    RITU MARTINEZ (Spouse) -- -- [phone]              Insurance Information                  [employer] [place] [place] Phone: [phone]    Subscriber: Alfreda Cabrera Subscriber#: [id number]    Group#: [id number] Precert#: --             History & Physical        Dinora Vargas APRN at 08/29/24 8833       Attestation signed by Temo Monique MD at 08/29/24 1319    Addendum:   Temo UNDERWOOD MD, personally performed the services described in " this documentation as scribed by APRN     , and it is both accurate and complete.  I have independently examined the patient and agree with the assessment and plan with additions and clarifications.  I performed > 50% of the management (including interview, exam, assessment, and plan) required in the care of this patient.    This is a 44-year-old male with a history of myocardial infarction, alcohol abuse complicated by alcohol withdrawal seizures who presented  to the emergency department intoxicated stating that he was there for alcohol detox.  He reports drinking about 2 L of vodka a day.  He started experiencing some tremors and cold sweats which he associated with alcoholic withdrawal and he so he was admitted for further evaluation.  Upon presentation his alcohol level was 238.    General: Alert and oriented x3, no acute distress.  HEENT: Normocephalic, atraumatic  Eyes: PERRL, EOMI, anicteric sclera  Lungs: Clear to auscultation bilaterally, no crackles or wheezes  CV: Regular rate and rhythm, no murmurs rubs or gallops  Abdomen: Soft, nontender, nondistended.  Normoactive bowel sounds  Extremities: No significant peripheral edema  Skin: Clean/dry/intact, no rashes  Neuro: Cranial nerves II through XII intact, no gross focal neurological deficits appreciated  Psych: Appropriate mood and affect    Assessment and plan  Alcohol intoxication  History of alcohol withdrawal seizures  CIWA protocol.  Will start phenobarbital as well  On IV thiamine and folic acid hypertension  Current    Coronary artery disease  Hypertension  On aspirin, aspirin, statin, Bystolic, losartan    Type 2 diabetes  SSI    Generalized anxiety disorder  SSRI    Temo Monique MD   8/29/2024  13:15 EDT                        Patient Name:  Bryant Edmonds  YOB: 1980  MRN:  [id number]  Admit Date:  8/29/2024  Patient Care Team:  Provider, No Known as PCP - General  Provider, No Known      Subjective  History Present Illness  "    Chief Complaint   Patient presents with    Alcohol Problem     History of Present Illness  Mr. Edmonds is a 44-year-old male with history of MI, PTSD, TBI, seizures, alcohol abuse who presents to the emergency room with alcohol intoxication.  Patient states that he is here for alcohol detox, he states in the past he has had withdrawal seizures as well as \" kidney failure\".  He states his last drink was last night around 6 PM.  He drinks about a 2 L of vodka a day.  He was actually seen in the emergency room and was planning to discharge with some outpatient therapy information but while he was waiting on his ride he developed some tremors and cold sweats which she has experienced before with alcohol withdrawal.  Been admitted to other hospitals with alcohol withdrawal in the past, he has also been admitted here to [facility] for alcohol withdrawal.  Patient states he actually lives in [place] but is here working.  In the emergency room his troponin was 34 with a repeat of 24, sodium 143, creatinine 0.75, BUN 14, glucose 183, white blood cell count 5.7, hemoglobin 17.2, hematocrit 50.5, platelets 247, initial alcohol level was 238.  Chest x-ray shows no focal consolidation.  EKG shows sinus tachycardia with a rate of 102.    Review of Systems   Constitutional:  Positive for diaphoresis and fatigue. Negative for appetite change and fever.   HENT:  Negative for nosebleeds and trouble swallowing.    Eyes:  Negative for photophobia, redness and visual disturbance.   Respiratory:  Negative for cough, chest tightness, shortness of breath and wheezing.    Cardiovascular:  Negative for chest pain, palpitations and leg swelling.   Gastrointestinal:  Negative for abdominal distention, abdominal pain, nausea and vomiting.   Endocrine: Negative.    Genitourinary: Negative.    Musculoskeletal:  Negative for gait problem and joint swelling.   Skin: Negative.    Neurological:  Positive for tremors. Negative for dizziness, " seizures, speech difficulty, light-headedness and headaches.   Hematological: Negative.    Psychiatric/Behavioral:  Negative for behavioral problems and confusion.         Personal History     Past Medical History:   Diagnosis Date    Acute renal failure (ARF)     Hearing loss     Myocardial infarction     Night terror     PTSD (post-traumatic stress disorder)     Seizures      Past Surgical History:   Procedure Laterality Date    CORONARY ANGIOPLASTY WITH STENT PLACEMENT      FRACTURE SURGERY       No family history on file.  Social History     Tobacco Use    Smoking status: Never    Smokeless tobacco: Current     Types: Snuff   Vaping Use    Vaping status: Never Used   Substance Use Topics    Alcohol use: Yes     Comment: 1 liter vodka; admites to intermittent binging    Drug use: Defer     Current Facility-Administered Medications on File Prior to Encounter   Medication Dose Route Frequency Provider Last Rate Last Admin    [COMPLETED] folic acid 1 mg in sodium chloride 0.9 % 50 mL IVPB  1 mg Intravenous Once Yamel Rodriguez PA-C   Stopped at 08/28/24 2216    [COMPLETED] ibuprofen (ADVIL,MOTRIN) tablet 600 mg  600 mg Oral Once Yamel Rodriguez PA-C   600 mg at 08/29/24 0005    [COMPLETED] lactated ringers bolus 1,000 mL  1,000 mL Intravenous Once Yamel Rodriguez PA-C   Stopped at 08/29/24 0009    [COMPLETED] thiamine (B-1) injection 100 mg  100 mg Intravenous Once Yamel Rodriguez PA-C   100 mg at 08/28/24 2127    [DISCONTINUED] sodium chloride 0.9 % flush 10 mL  10 mL Intravenous PRN Jay Last MD         Current Outpatient Medications on File Prior to Encounter   Medication Sig Dispense Refill    ALPRAZolam (XANAX) 1 MG tablet       sertraline (ZOLOFT) 50 MG tablet 1 tablet.      aspirin 81 MG chewable tablet Chew 1 tablet Daily.      atorvastatin (LIPITOR) 20 MG tablet Take 1 tablet by mouth Every Night. 90 tablet 0    cloNIDine (CATAPRES) 0.1 MG tablet Take 1 tablet by mouth 2 (Two) Times a Day As  Needed for High Blood Pressure (SBP Greater Than 180). 60 tablet 0    folic acid (FOLVITE) 1 MG tablet Take 1 tablet by mouth Daily.      HydrOXYzine Pamoate (VISTARIL PO) Take  by mouth.      losartan (COZAAR) 50 MG tablet Take 1 tablet by mouth Daily. 90 tablet 0    multivitamin with minerals tablet tablet Take 1 tablet by mouth Daily.      nebivolol (BYSTOLIC) 10 MG tablet Take 1 tablet by mouth Daily. 7 tablet 0    phenytoin ER (DILANTIN) 100 MG capsule Take 1 capsule by mouth Every 6 (Six) Hours for 30 days. 120 capsule 0    thiamine (VITAMIN B1) 100 MG tablet Take 1 tablet by mouth Daily.      UNKNOWN TO PATIENT Cholesterol meds       No Known Allergies    Objective   Objective     Vital Signs  Temp:  [97.4 °F (36.3 °C)-97.7 °F (36.5 °C)] 97.4 °F (36.3 °C)  Heart Rate:  [79-95] 80  Resp:  [20] 20  BP: (164-175)/() 164/104  SpO2:  [90 %-98 %] 90 %  on   ;   Device (Oxygen Therapy): room air  Body mass index is 29.99 kg/m².    Physical Exam  Vitals and nursing note reviewed.   Constitutional:       General: He is not in acute distress.     Appearance: He is well-developed.   HENT:      Head: Normocephalic.   Neck:      Vascular: No JVD.   Cardiovascular:      Rate and Rhythm: Normal rate and regular rhythm.      Comments: Normal sinus rhythm on the monitor with heart rate 96 during my exam.  He denies any chest pain, no peripheral edema.  Pulmonary:      Effort: Pulmonary effort is normal.      Breath sounds: Normal breath sounds.      Comments: Lungs lungs clear, sats 98% room air  Abdominal:      General: Bowel sounds are normal. There is no distension.      Palpations: Abdomen is soft.      Tenderness: There is no abdominal tenderness.   Musculoskeletal:         General: Normal range of motion.      Cervical back: Normal range of motion.   Skin:     General: Skin is warm and dry.      Capillary Refill: Capillary refill takes less than 2 seconds.   Neurological:      General: No focal deficit present.       Mental Status: He is alert and oriented to person, place, and time.      Comments: Patient does have some occasional tremor, but answers question appropriately, follows commands, repetitive in  answers.   Psychiatric:         Behavior: Behavior normal.         Results Review:  I reviewed the patient's new clinical results.  I reviewed the patient's new imaging results and agree with the interpretation.  I reviewed the patient's other test results and agree with the interpretation  I personally viewed and interpreted the patient's EKG/Telemetry data  Discussed with ED provider.    Lab Results (last 24 hours)       Procedure Component Value Units Date/Time    CBC & Differential [[id number]]  (Abnormal) Collected: 08/28/24 2001    Specimen: Blood Updated: 08/28/24 2016    Narrative:      The following orders were created for panel order CBC & Differential.  Procedure                               Abnormality         Status                     ---------                               -----------         ------                     CBC Auto Differential[[id number]]        Abnormal            Final result                 Please view results for these tests on the individual orders.    BNP [[id number]]  (Normal) Collected: 08/28/24 2001    Specimen: Blood Updated: 08/28/24 2038     proBNP <36.0 pg/mL     Narrative:      This assay is used as an aid in the diagnosis of individuals suspected of having heart failure. It can be used as an aid in the diagnosis of acute decompensated heart failure (ADHF) in patients presenting with signs and symptoms of ADHF to the emergency department (ED). In addition, NT-proBNP of <300 pg/mL indicates ADHF is not likely.    Age Range Result Interpretation  NT-proBNP Concentration (pg/mL:      <50             Positive            >450                   Gray                 300-450                    Negative             <300    50-75           Positive            >900                  [place]                 300-900                  Negative            <300      >75             Positive            >1800                  Gray                300-1800                  Negative            <300    CBC Auto Differential [[id number]]  (Abnormal) Collected: 08/28/24 2001    Specimen: Blood Updated: 08/28/24 2016     WBC 5.73 10*3/mm3      RBC 5.36 10*6/mm3      Hemoglobin 17.2 g/dL      Hematocrit 50.5 %      MCV 94.2 fL      MCH 32.1 pg      MCHC 34.1 g/dL      RDW 13.4 %      RDW-SD 46.3 fl      MPV 10.8 fL      Platelets 247 10*3/mm3      Neutrophil % 60.5 %      Lymphocyte % 31.1 %      Monocyte % 4.7 %      Eosinophil % 2.1 %      Basophil % 1.4 %      Immature Grans % 0.2 %      Neutrophils, Absolute 3.47 10*3/mm3      Lymphocytes, Absolute 1.78 10*3/mm3      Monocytes, Absolute 0.27 10*3/mm3      Eosinophils, Absolute 0.12 10*3/mm3      Basophils, Absolute 0.08 10*3/mm3      Immature Grans, Absolute 0.01 10*3/mm3      nRBC 0.0 /100 WBC     Ethanol [[id number]]  (Abnormal) Collected: 08/28/24 2001    Specimen: Blood Updated: 08/28/24 2038     Ethanol 238 mg/dL      Ethanol % 0.238 %     Comprehensive Metabolic Panel [[id number]]  (Abnormal) Collected: 08/28/24 2105    Specimen: Blood Updated: 08/28/24 2136     Glucose 183 mg/dL      BUN 14 mg/dL      Creatinine 0.75 mg/dL      Sodium 143 mmol/L      Potassium 3.7 mmol/L      Chloride 104 mmol/L      CO2 24.0 mmol/L      Calcium 8.5 mg/dL      Total Protein 7.1 g/dL      Albumin 4.5 g/dL      ALT (SGPT) 118 U/L      AST (SGOT) 61 U/L      Alkaline Phosphatase 74 U/L      Total Bilirubin 0.3 mg/dL      Globulin 2.6 gm/dL      A/G Ratio 1.7 g/dL      BUN/Creatinine Ratio 18.7     Anion Gap 15.0 mmol/L      eGFR 112.7 mL/min/1.73     Narrative:      GFR Normal >60  Chronic Kidney Disease <60  Kidney Failure <15      Single High Sensitivity Troponin T [[id number]]  (Abnormal) Collected: 08/28/24 2105    Specimen: Blood Updated: 08/28/24 2136     HS Troponin T 34 ng/L      Narrative:      High Sensitive Troponin T Reference Range:  <14.0 ng/L- Negative Female for AMI  <22.0 ng/L- Negative Male for AMI  >=14 - Abnormal Female indicating possible myocardial injury.  >=22 - Abnormal Male indicating possible myocardial injury.   Clinicians would have to utilize clinical acumen, EKG, Troponin, and serial changes to determine if it is an Acute Myocardial Infarction or myocardial injury due to an underlying chronic condition.         Magnesium [[id number]]  (Normal) Collected: 08/28/24 2105    Specimen: Blood Updated: 08/28/24 2136     Magnesium 2.0 mg/dL     Single High Sensitivity Troponin T [[id number]]  (Abnormal) Collected: 08/28/24 2240    Specimen: Blood Updated: 08/28/24 2322     HS Troponin T 24 ng/L     Narrative:      High Sensitive Troponin T Reference Range:  <14.0 ng/L- Negative Female for AMI  <22.0 ng/L- Negative Male for AMI  >=14 - Abnormal Female indicating possible myocardial injury.  >=22 - Abnormal Male indicating possible myocardial injury.   Clinicians would have to utilize clinical acumen, EKG, Troponin, and serial changes to determine if it is an Acute Myocardial Infarction or myocardial injury due to an underlying chronic condition.         Ethanol [[id number]]  (Abnormal) Collected: 08/29/24 0315    Specimen: Blood Updated: 08/29/24 0345     Ethanol 31 mg/dL      Ethanol % 0.031 %             Imaging Results (Last 24 Hours)       ** No results found for the last 24 hours. **            Results for orders placed during the hospital encounter of 06/29/24    Adult Transthoracic Echo Complete With Contrast if Necessary Per Protocol    Interpretation Summary    Left ventricular ejection fraction appears to be 51 - 55%.    The right ventricular cavity is borderline dilated.    Estimated right ventricular systolic pressure from tricuspid regurgitation is normal (<35 mmHg).      ECG 12 Lead Chest Pain   Preliminary Result   HEART RATE=92  bpm   RR Zmcqcaal=428  ms   IN  Wpvmqhns=563  ms   P Horizontal Axis=-30  deg   P Front Axis=-27  deg   QRSD Interval=87  ms   QT Saefjurb=389  ms   JMoI=176  ms   QRS Axis=4  deg   T Wave Axis=-3  deg   - BORDERLINE ECG -   Sinus rhythm   Borderline T abnormalities, inferior leads   Date and Time of Study:2024-08-29 01:56:30           Assessment/Plan     Active Hospital Problems    Diagnosis  POA    **Alcohol withdrawal [F10.939]  Yes    ROSALINO (generalized anxiety disorder) [F41.1]  Yes    PTSD (post-traumatic stress disorder) [F43.10]  Yes    TBI (traumatic brain injury) [S06.9XAA]  Yes    Primary hypertension [I10]  Yes    Coronary artery disease involving native coronary artery with unstable angina pectoris [I25.110]  Yes    Type 2 diabetes mellitus with hyperglycemia, with long-term current use of insulin [E11.65, Z79.4]  Not Applicable    Seizure disorder [G40.909]  Yes     Mr. Edmonds is a 44-year-old male with history of MI, PTSD, TBI, seizures, alcohol abuse who presents to the emergency room with alcohol intoxication.    Alcohol withdrawal  -Initiate CIWA protocol  -Started on thiamine and multivitamin  -Telemetry unit for monitoring   -neurochecks every 4 hours  -Consult access center  -Repeat CBC, BMP in a.m.  -Seizure precautions    Hypertension/CAD  -Chronic conditions  -Continue home medications when med rec complete    Type 2 diabetes  -Accu-Cheks before meals and at bedtime with correctional dose insulin  -Hold oral diabetic medications    I discussed the patient's findings and my recommendations with patient.    VTE Prophylaxis - SCDs.  Code Status - Full code.       PAULO Schwartz  [facility]  08/29/24  04:35 EDT     Electronically signed by Temo Monique MD at 08/29/24 2713       Current Facility-Administered Medications   Medication Dose Route Frequency Provider Last Rate Last Admin    acetaminophen (TYLENOL) tablet 650 mg  650 mg Oral Q4H PRN Dinora Vargas APRN   650 mg at 08/31/24 2230     Or    acetaminophen (TYLENOL) 160 MG/5ML oral solution 650 mg  650 mg Oral Q4H PRN Dinora Vargas APRN        Or    acetaminophen (TYLENOL) suppository 650 mg  650 mg Rectal Q4H PRN Dinora Vargas APRN        aspirin chewable tablet 81 mg  81 mg Oral Daily Temo Monique MD   81 mg at 09/04/24 0824    atorvastatin (LIPITOR) tablet 20 mg  20 mg Oral Nightly Rambo Niño MD   20 mg at 09/03/24 2321    sennosides-docusate (PERICOLACE) 8.6-50 MG per tablet 2 tablet  2 tablet Oral BID PRN Dinora Vargas APRN        And    polyethylene glycol (MIRALAX) packet 17 g  17 g Oral Daily PRN Dinora Vargas APRN        And    bisacodyl (DULCOLAX) EC tablet 5 mg  5 mg Oral Daily PRN Dinora Vargas APRN        And    bisacodyl (DULCOLAX) suppository 10 mg  10 mg Rectal Daily PRN Dinora Vargas APRN        dextrose (D50W) (25 g/50 mL) IV injection 25 g  25 g Intravenous Q15 Min PRN Dinora Vargas APRN        dextrose (GLUTOSE) oral gel 15 g  15 g Oral Q15 Min PRN Dinora Vargas APRN        Enoxaparin Sodium (LOVENOX) syringe 40 mg  40 mg Subcutaneous Q24H Temo Monique MD   40 mg at 09/03/24 1455    famotidine (PEPCID) tablet 20 mg  20 mg Oral Once Rambo Niño MD        famotidine (PEPCID) tablet 20 mg  20 mg Oral BID PRN Rambo Niño MD        folic acid (FOLVITE) tablet 1 mg  1 mg Oral Daily Dinora Vargas APRN   1 mg at 09/04/24 0824    glucagon (GLUCAGEN) injection 1 mg  1 mg Intramuscular Q15 Min PRN Dinora Vargas APRN        hydrOXYzine pamoate (VISTARIL) capsule 25 mg  25 mg Oral Nightly Rambo Niño MD   25 mg at 09/03/24 2321    insulin lispro (HUMALOG/ADMELOG) injection 2-7 Units  2-7 Units Subcutaneous 4x Daily AC & at Bedtime Dinora Vargas APRN   2 Units at 09/04/24 1226    losartan (COZAAR) tablet 50 mg  50 mg Oral Q24H Temo Monique MD   50 mg at 09/04/24 0824    Magnesium Standard Dose Replacement -  Follow Nurse / BPA Driven Protocol   Does not apply PRN Dinora Vargas APRN        nebivolol (BYSTOLIC) tablet 10 mg  10 mg Oral Daily Temo Monique MD   10 mg at 24 0824    nitroglycerin (NITROSTAT) SL tablet 0.4 mg  0.4 mg Sublingual Q5 Min PRN Dinora Vargas APRN   0.4 mg at 24 2250    ondansetron (ZOFRAN) injection 4 mg  4 mg Intravenous Q4H PRN Rambo Niño MD   4 mg at 24 1656    pantoprazole (PROTONIX) EC tablet 40 mg  40 mg Oral Daily With Breakfast & Dinner Temo Monique MD   40 mg at 24 1029    phenytoin ER (DILANTIN) capsule 100 mg  100 mg Oral Q6H Temo Monique MD   100 mg at 24 1135    sertraline (ZOLOFT) tablet 50 mg  50 mg Oral Daily Temo Monique MD   50 mg at 24 0824    sodium chloride 0.9 % flush 10 mL  10 mL Intravenous PRN Bryn Urbano MD        sodium chloride 0.9 % flush 10 mL  10 mL Intravenous Q12H Dinora Vargas APRN   10 mL at 24 0825    sodium chloride 0.9 % flush 10 mL  10 mL Intravenous PRN Dinora Vargas APRN        sodium chloride 0.9 % infusion 40 mL  40 mL Intravenous PRN Dinora Vargas APRN        thiamine (VITAMIN B-1) tablet 100 mg  100 mg Oral Daily Dinora Vargas APRN   100 mg at 24 0824        Physician Progress Notes (last 48 hours)        Rambo Niño MD at 24 1414              [facility] Medicine Services  PROGRESS NOTE    Patient Name: Bryant Edmonds  : 1980  MRN: [id number]    Date of Admission: 2024  Primary Care Physician: Provider, No Known    Subjective   Subjective     CC:  Follow-up alcohol withdrawal    Subjective: Patient is having some dizziness and reports some nausea this morning.  He does not feel ready to discharge.  He believes he will be ready to go to rehab tomorrow.  He is planning to go to [place].  I discussed with his family whether it was realistic that he could get to [place].  They said they will talk to him  further and make discussions about whether they can find some sort of transportation.  I also suggested we give him some local rehab options to see if these would be reasonable.  I reviewed patient's laboratory studies and vital signs and findings thus far.  I discussed his treatment thus far.  Patient seemed to have good understanding of his treatment plan.    Review of Systems  No current fevers or chills  No current shortness of breath or cough  No current chest pain or palpitations       Objective   Objective     Vital Signs:   Temp:  [97.2 °F (36.2 °C)-98.1 °F (36.7 °C)] 98 °F (36.7 °C)  Heart Rate:  [66-75] 66  Resp:  [16] 16  BP: (129-169)/(64-89) 129/68        Physical Exam:  Constitutional:Awake, alert  HENT: NCAT, mucous membranes moist, neck supple  Respiratory: No cough or wheezes, normal respirations, nonlabored breathing   Cardiovascular: Pulse rate is normal, palpable radial pulses  Gastrointestinal:  soft, nontender, nondistended  Musculoskeletal: Normal musculature for age, no lower extremity edema  Psychiatric: Appropriate affect, cooperative, conversational  Neurologic: No slurred speech or facial droop, follows commands  Skin: No rashes or jaundice, warm      Results Reviewed:  Results from last 7 days   Lab Units 09/03/24  0735 09/02/24  0453 09/01/24  0556   WBC 10*3/mm3 6.70 4.58 4.70   HEMOGLOBIN g/dL 16.0 16.2 15.8   HEMATOCRIT % 47.2 47.3 46.5   PLATELETS 10*3/mm3 158 148 143     Results from last 7 days   Lab Units 09/03/24  0735 09/02/24  0453 09/01/24  0556 08/31/24  1138 08/31/24  0930 08/31/24  0559 08/29/24  0520 08/28/24  2240 08/28/24  2105 08/28/24 2001   SODIUM mmol/L 134* 137 136  --   --  138   < >  --    < >  --    POTASSIUM mmol/L 4.3 4.0 4.2  --   --  4.0   < >  --    < >  --    CHLORIDE mmol/L 102 105 103  --   --  103   < >  --    < >  --    CO2 mmol/L 25.0 22.0 22.0  --   --  25.5   < >  --    < >  --    BUN mg/dL 14 11 12  --   --  11   < >  --    < >  --    CREATININE  mg/dL 0.79 0.75* 0.62*  --   --  0.76   < >  --    < >  --    GLUCOSE mg/dL 114* 131* 162*  --   --  155*   < >  --    < >  --    CALCIUM mg/dL 9.1 9.0 8.9  --   --  9.1   < >  --    < >  --    ALK PHOS U/L  --  61 60  --   --  59   < >  --    < >  --    ALT (SGPT) U/L  --  54* 58*  --   --  66*   < >  --    < >  --    AST (SGOT) U/L  --  30 33  --   --  30   < >  --    < >  --    HSTROP T ng/L  --   --   --  20 20  --   --  24*   < >  --    PROBNP pg/mL  --   --   --   --   --   --   --   --   --  <36.0    < > = values in this interval not displayed.     Estimated Creatinine Clearance: 131.1 mL/min (by C-G formula based on SCr of 0.79 mg/dL).    Results for orders placed during the hospital encounter of 06/29/24    Adult Transthoracic Echo Complete With Contrast if Necessary Per Protocol    Interpretation Summary    Left ventricular ejection fraction appears to be 51 - 55%.    The right ventricular cavity is borderline dilated.    Estimated right ventricular systolic pressure from tricuspid regurgitation is normal (<35 mmHg).      I have reviewed the medications:  Scheduled Meds:aspirin, 81 mg, Oral, Daily  enoxaparin, 40 mg, Subcutaneous, Q24H  famotidine, 20 mg, Oral, Once  folic acid, 1 mg, Oral, Daily  insulin lispro, 2-7 Units, Subcutaneous, 4x Daily AC & at Bedtime  losartan, 50 mg, Oral, Q24H  nebivolol, 10 mg, Oral, Daily  pantoprazole, 40 mg, Oral, Daily With Breakfast & Dinner  phenytoin ER, 100 mg, Oral, Q6H  sertraline, 50 mg, Oral, Daily  sodium chloride, 10 mL, Intravenous, Q12H  thiamine, 100 mg, Oral, Daily      Continuous Infusions:   PRN Meds:.  acetaminophen **OR** acetaminophen **OR** acetaminophen    senna-docusate sodium **AND** polyethylene glycol **AND** bisacodyl **AND** bisacodyl    dextrose    dextrose    famotidine    glucagon (human recombinant)    Magnesium Standard Dose Replacement - Follow Nurse / BPA Driven Protocol    nitroglycerin    ondansetron    [COMPLETED] Insert Peripheral IV  **AND** sodium chloride    sodium chloride    sodium chloride    Assessment & Plan   Assessment & Plan     Active Hospital Problems    Diagnosis  POA    **Alcohol withdrawal [F10.939]  Yes    ROSALINO (generalized anxiety disorder) [F41.1]  Yes    PTSD (post-traumatic stress disorder) [F43.10]  Yes    TBI (traumatic brain injury) [S06.9XAA]  Yes    Primary hypertension [I10]  Yes    Coronary artery disease involving native coronary artery with unstable angina pectoris [I25.110]  Yes    Type 2 diabetes mellitus with hyperglycemia, with long-term current use of insulin [E11.65, Z79.4]  Not Applicable    Seizure disorder [G40.909]  Yes      Resolved Hospital Problems   No resolved problems to display.        Brief Hospital Course to date:  Bryant Edmonds is a 44 y.o. male presents the hospital with alcohol withdrawal    Discussion/plan for today: All medical problems are new under my management today.  Alcohol withdrawal seems to be resolved.  Lorazepam discontinued.  Long half-life of phenobarbital should allow for maintained withdrawal.  Zofran added as needed for nausea.  No witnessed vomiting.  Patient tolerating oral intake.  IV fluid discontinued and patient monitored off IV fluid.  Blood pressure currently well-controlled.  Recent laboratory studies are very reassuring.  I have requested that case management and access team to provide patient with a local resources for sobriety including local rehabs as patient plans to go to a rehab in [place] however I am uncertain if this is realistic as family has not yet looked into ways for transportation.  I have asked family to discuss with patient realistic plans for discharge and realistic plans for sobriety.  Case discussed with nursing as well.  Glucose reviewed and continue correction insulin.  Faisal reviewed and it is noted that patient received prescription Norco, Xanax, and pregabalin for 1 week back on 8/21.  At this point I feel the risks of these medications  outweigh the benefits.  I would recommend patient discontinue these medications unless he has a long-term psychiatrist recommending them long-term for his acute issues as if he starts the short-term and does not have a continuing provider he will be at high risk for withdrawal in the future.  Patient is understanding of my recommendations.  Anticipate discharge tomorrow as patient is reaching stability pending no further setbacks and discharge plan.    Alcohol intoxication  History of alcohol withdrawal seizures  CIWA protocol with phenobaribtal  On IV thiamine and folic acid      Chest wall tenderness  Troponin and EKG wnl  Reports multiple falls  Obtain CT chest without contrast to evaluate for rib fractures- negative     Coronary artery disease  Hypertension  On aspirin, aspirin, statin, Bystolic, losartan     Type 2 diabetes  SSI     Generalized anxiety disorder  SSRI        Lovenox 40 mg SC daily for DVT prophylaxis.  Full code.  Discussed with patient and nursing staff.  Anticipate discharge home tomorrow    CODE STATUS:   Code Status and Medical Interventions: CPR (Attempt to Resuscitate); Full Support   Ordered at: 08/29/24 0433     Code Status (Patient has no pulse and is not breathing):    CPR (Attempt to Resuscitate)     Medical Interventions (Patient has pulse or is breathing):    Full Support       Rambo Niño MD  09/03/24      Electronically signed by Rambo Niño MD at 09/03/24 1421          Consult Notes (last 48 hours)        Carly Stearns at 09/04/24 1150        Consult Orders    1. Inpatient Spiritual Care Consult [[id number]] ordered by Rambo Niño MD at 09/03/24 1607                 Visit with patient at bedside. Supportive conversation. Notarized paperwork allowing friend to  his car.     Electronically signed by Carly Stearns at 09/04/24 1150

## 2024-09-04 NOTE — CASE MANAGEMENT/SOCIAL WORK
Continued Stay Note  [facility]     Patient Name: Bryant Edmonds  MRN: [id number]  Today's Date: 9/4/2024    Admit Date: 8/29/2024    Plan: Rehab   Discharge Plan       Row Name 09/04/24 1533       Plan    Plan Rehab    Patient/Family in Agreement with Plan yes    Plan Comments Met with pt in room. He stated that the treatment facility he had planned on going to in [place] told him this afternoon that they cannot accept. He stated that he is working with a woman named Huong at [facility] to get into her treatment facility in [place]. He gave this CCP permission to look into other treatment options as well. Spoke with Johanna ([phone]) at [facility] who stated that they cannot admit him for alcohol treatment alone but because he is a  and has PTSD, they may be able to admit him for inpatient treatment. Her admission department is going to call the pt and conduct a zoom interview tonight. Updated primary RN and MD. CCP following. ELDER Sanchez RN                   Discharge Codes    No documentation.                 Expected Discharge Date and Time       Expected Discharge Date Expected Discharge Time    Sep 4, 2024               Hector Ball RN

## 2024-09-04 NOTE — CASE MANAGEMENT/SOCIAL WORK
Continued Stay Note  [facility]     Patient Name: Bryant Edmonds  MRN: [id number]  Today's Date: 9/4/2024    Admit Date: 8/29/2024    Plan: Home vs rehab   Discharge Plan       Row Name 09/04/24 1634       Plan    Plan Home vs rehab    Patient/Family in Agreement with Plan yes    Plan Comments Met with pt in room. He requested that progress notes be faxed to [facility]. Fax sent. He stated that if they do not have a bed available for him tonight, then he will call a friend and go stay with his friend for the night. We discussed a back up plan and he stated that if his friend is not able to come get him tonight, then he will stay in a motel. Cab voucher left in CCP office for pt if needed. Updated primary RN and CCP manager. Pt denied any further needs at this time. CCP following. ELDER Sanchez RN      Row Name 09/04/24 4804       Plan    Plan Rehab    Patient/Family in Agreement with Plan yes    Plan Comments Met with pt in room. He stated that the treatment facility he had planned on going to in [place] told him this afternoon that they cannot accept. He stated that he is working with a woman named Huong at [facility] to get into her treatment facility in [place]. He gave this CCP permission to look into other treatment options as well. Spoke with Johanna ([phone]) at [facility] who stated that they cannot admit him for alcohol treatment alone but because he is a vetran and has PTSD, they may be able to admit him for inpatient treatment. Her admission department is going to call the pt and conduct a zoom interview tonight. Updated primary RN and MD. CCP following. ELDER Sanchez RN                   Discharge Codes    No documentation.                 Expected Discharge Date and Time       Expected Discharge Date Expected Discharge Time    Sep 4, 2024               Hector Ball RN

## 2024-09-04 NOTE — DISCHARGE PLACEMENT REQUEST
"Alfreda Cabrera (44 y.o. Male)       Date of Birth   1980    Social Security Number       Address   [address]  [facility] [address]    Home Phone   [phone]    MRN   [id number]       Pentecostalism   None    Marital Status   Single                            Admission Date   8/29/24    Admission Type   Emergency    Admitting Provider   Glen Lund MD    Attending Provider   Rambo Niño MD    Department, Room/Bed   [address], [address]1       Discharge Date       Discharge Disposition   Home or Self Care    Discharge Destination                                 Attending Provider: Rambo Niño MD    Allergies: No Known Allergies    Isolation: None   Infection: None   Code Status: CPR    Ht: 177.8 cm (70\")   Wt: 88.7 kg (195 lb 8.8 oz)    Admission Cmt: None   Principal Problem: Alcohol withdrawal [F10.939]                   Active Insurance as of 8/29/2024       Primary Coverage       Payor Plan Insurance Group Employer/Plan Group    [employer] [place]ANGE [employer] [id number]       Payor Plan Address Payor Plan Phone Number Payor Plan Fax Number Effective Dates    [address] [phone]  2/1/2024 - None Entered    [facility] [id number]         Subscriber Name Subscriber Birth Date Member ID       ALFREDA CABRERA 1980 [id number]                     Emergency Contacts        (Rel.) Home Phone Work Phone Mobile Phone    RITU MARTINEZ (Spouse) -- -- [phone]              Emergency Contact Information       Name Relation Home Work Mobile    RITU MARTINEZ Spouse   [phone]          "

## 2024-09-04 NOTE — DISCHARGE PLACEMENT REQUEST
"Alfreda Cabrera (44 y.o. Male)       Date of Birth   1980    Social Security Number       Address   [address]  [facility] [address]    Home Phone   [phone]    MRN   [id number]       Pentecostalism   None    Marital Status   Single                            Admission Date   8/29/24    Admission Type   Emergency    Admitting Provider   Glen Lund MD    Attending Provider   Rambo Niño MD    Department, Room/Bed   [address], [address]/1       Discharge Date       Discharge Disposition   Home or Self Care    Discharge Destination                                 Attending Provider: Rambo Niño MD    Allergies: No Known Allergies    Isolation: None   Infection: None   Code Status: CPR    Ht: 177.8 cm (70\")   Wt: 88.7 kg (195 lb 8.8 oz)    Admission Cmt: None   Principal Problem: Alcohol withdrawal [F10.939]                   Active Insurance as of 8/29/2024       Primary Coverage       Payor Plan Insurance Group Employer/Plan Group    [employer] [place] [employer] [place]       Payor Plan Address Payor Plan Phone Number Payor Plan Fax Number Effective Dates    [address] [phone]  2/1/2024 - None Entered    [facility] [id number]         Subscriber Name Subscriber Birth Date Member ID       ALFREDA CABRERA 1980 [id number]                     Emergency Contacts        (Rel.) Home Phone Work Phone Mobile Phone    MICHELLERITU (Spouse) -- -- [phone]              Emergency Contact Information       Name Relation Home Work Mobile    CHERI MARTINEZN Spouse   [phone]             History & Physical        Dinora Vargas APRN at 08/29/24 9309       Attestation signed by Temo Monique MD at 08/29/24 1319    Addendum:   I, Temo Monique MD, personally performed the services described in this documentation as scribed by PAULO     , and it is both accurate and complete.  I have " independently examined the patient and agree with the assessment and plan with additions and clarifications.  I performed > 50% of the management (including interview, exam, assessment, and plan) required in the care of this patient.    This is a 44-year-old male with a history of myocardial infarction, alcohol abuse complicated by alcohol withdrawal seizures who presented  to the emergency department intoxicated stating that he was there for alcohol detox.  He reports drinking about 2 L of vodka a day.  He started experiencing some tremors and cold sweats which he associated with alcoholic withdrawal and he so he was admitted for further evaluation.  Upon presentation his alcohol level was 238.    General: Alert and oriented x3, no acute distress.  HEENT: Normocephalic, atraumatic  Eyes: PERRL, EOMI, anicteric sclera  Lungs: Clear to auscultation bilaterally, no crackles or wheezes  CV: Regular rate and rhythm, no murmurs rubs or gallops  Abdomen: Soft, nontender, nondistended.  Normoactive bowel sounds  Extremities: No significant peripheral edema  Skin: Clean/dry/intact, no rashes  Neuro: Cranial nerves II through XII intact, no gross focal neurological deficits appreciated  Psych: Appropriate mood and affect    Assessment and plan  Alcohol intoxication  History of alcohol withdrawal seizures  CIWA protocol.  Will start phenobarbital as well  On IV thiamine and folic acid hypertension  Current    Coronary artery disease  Hypertension  On aspirin, aspirin, statin, Bystolic, losartan    Type 2 diabetes  SSI    Generalized anxiety disorder  SSRI    Temo Monique MD   8/29/2024  13:15 EDT                        Patient Name:  Bryant Edmonds  YOB: 1980  MRN:  [id number]  Admit Date:  8/29/2024  Patient Care Team:  Provider, No Known as PCP - General  Provider, No Known      Subjective  History Present Illness     Chief Complaint   Patient presents with    Alcohol Problem     History of Present  "Illness  Mr. Edmonds is a 44-year-old male with history of MI, PTSD, TBI, seizures, alcohol abuse who presents to the emergency room with alcohol intoxication.  Patient states that he is here for alcohol detox, he states in the past he has had withdrawal seizures as well as \" kidney failure\".  He states his last drink was last night around 6 PM.  He drinks about a 2 L of vodka a day.  He was actually seen in the emergency room and was planning to discharge with some outpatient therapy information but while he was waiting on his ride he developed some tremors and cold sweats which she has experienced before with alcohol withdrawal.  Been admitted to other hospitals with alcohol withdrawal in the past, he has also been admitted here to [facility] for alcohol withdrawal.  Patient states he actually lives in [place] but is here working.  In the emergency room his troponin was 34 with a repeat of 24, sodium 143, creatinine 0.75, BUN 14, glucose 183, white blood cell count 5.7, hemoglobin 17.2, hematocrit 50.5, platelets 247, initial alcohol level was 238.  Chest x-ray shows no focal consolidation.  EKG shows sinus tachycardia with a rate of 102.    Review of Systems   Constitutional:  Positive for diaphoresis and fatigue. Negative for appetite change and fever.   HENT:  Negative for nosebleeds and trouble swallowing.    Eyes:  Negative for photophobia, redness and visual disturbance.   Respiratory:  Negative for cough, chest tightness, shortness of breath and wheezing.    Cardiovascular:  Negative for chest pain, palpitations and leg swelling.   Gastrointestinal:  Negative for abdominal distention, abdominal pain, nausea and vomiting.   Endocrine: Negative.    Genitourinary: Negative.    Musculoskeletal:  Negative for gait problem and joint swelling.   Skin: Negative.    Neurological:  Positive for tremors. Negative for dizziness, seizures, speech difficulty, light-headedness and headaches.   Hematological: " Negative.    Psychiatric/Behavioral:  Negative for behavioral problems and confusion.         Personal History     Past Medical History:   Diagnosis Date    Acute renal failure (ARF)     Hearing loss     Myocardial infarction     Night terror     PTSD (post-traumatic stress disorder)     Seizures      Past Surgical History:   Procedure Laterality Date    CORONARY ANGIOPLASTY WITH STENT PLACEMENT      FRACTURE SURGERY       No family history on file.  Social History     Tobacco Use    Smoking status: Never    Smokeless tobacco: Current     Types: Snuff   Vaping Use    Vaping status: Never Used   Substance Use Topics    Alcohol use: Yes     Comment: 1 liter vodka; admites to intermittent binging    Drug use: Defer     Current Facility-Administered Medications on File Prior to Encounter   Medication Dose Route Frequency Provider Last Rate Last Admin    [COMPLETED] folic acid 1 mg in sodium chloride 0.9 % 50 mL IVPB  1 mg Intravenous Once Yamel Rodriguez PA-C   Stopped at 08/28/24 2216    [COMPLETED] ibuprofen (ADVIL,MOTRIN) tablet 600 mg  600 mg Oral Once Yamel Rodriguez PA-C   600 mg at 08/29/24 0005    [COMPLETED] lactated ringers bolus 1,000 mL  1,000 mL Intravenous Once Yamel Rodriguez PA-C   Stopped at 08/29/24 0009    [COMPLETED] thiamine (B-1) injection 100 mg  100 mg Intravenous Once Yamel Rodriguez PA-C   100 mg at 08/28/24 2127    [DISCONTINUED] sodium chloride 0.9 % flush 10 mL  10 mL Intravenous PRN Jay Last MD         Current Outpatient Medications on File Prior to Encounter   Medication Sig Dispense Refill    ALPRAZolam (XANAX) 1 MG tablet       sertraline (ZOLOFT) 50 MG tablet 1 tablet.      aspirin 81 MG chewable tablet Chew 1 tablet Daily.      atorvastatin (LIPITOR) 20 MG tablet Take 1 tablet by mouth Every Night. 90 tablet 0    cloNIDine (CATAPRES) 0.1 MG tablet Take 1 tablet by mouth 2 (Two) Times a Day As Needed for High Blood Pressure (SBP Greater Than 180). 60 tablet 0    folic  acid (FOLVITE) 1 MG tablet Take 1 tablet by mouth Daily.      HydrOXYzine Pamoate (VISTARIL PO) Take  by mouth.      losartan (COZAAR) 50 MG tablet Take 1 tablet by mouth Daily. 90 tablet 0    multivitamin with minerals tablet tablet Take 1 tablet by mouth Daily.      nebivolol (BYSTOLIC) 10 MG tablet Take 1 tablet by mouth Daily. 7 tablet 0    phenytoin ER (DILANTIN) 100 MG capsule Take 1 capsule by mouth Every 6 (Six) Hours for 30 days. 120 capsule 0    thiamine (VITAMIN B1) 100 MG tablet Take 1 tablet by mouth Daily.      UNKNOWN TO PATIENT Cholesterol meds       No Known Allergies    Objective   Objective     Vital Signs  Temp:  [97.4 °F (36.3 °C)-97.7 °F (36.5 °C)] 97.4 °F (36.3 °C)  Heart Rate:  [79-95] 80  Resp:  [20] 20  BP: (164-175)/() 164/104  SpO2:  [90 %-98 %] 90 %  on   ;   Device (Oxygen Therapy): room air  Body mass index is 29.99 kg/m².    Physical Exam  Vitals and nursing note reviewed.   Constitutional:       General: He is not in acute distress.     Appearance: He is well-developed.   HENT:      Head: Normocephalic.   Neck:      Vascular: No JVD.   Cardiovascular:      Rate and Rhythm: Normal rate and regular rhythm.      Comments: Normal sinus rhythm on the monitor with heart rate 96 during my exam.  He denies any chest pain, no peripheral edema.  Pulmonary:      Effort: Pulmonary effort is normal.      Breath sounds: Normal breath sounds.      Comments: Lungs lungs clear, sats 98% room air  Abdominal:      General: Bowel sounds are normal. There is no distension.      Palpations: Abdomen is soft.      Tenderness: There is no abdominal tenderness.   Musculoskeletal:         General: Normal range of motion.      Cervical back: Normal range of motion.   Skin:     General: Skin is warm and dry.      Capillary Refill: Capillary refill takes less than 2 seconds.   Neurological:      General: No focal deficit present.      Mental Status: He is alert and oriented to person, place, and time.       Comments: Patient does have some occasional tremor, but answers question appropriately, follows commands, repetitive in  answers.   Psychiatric:         Behavior: Behavior normal.         Results Review:  I reviewed the patient's new clinical results.  I reviewed the patient's new imaging results and agree with the interpretation.  I reviewed the patient's other test results and agree with the interpretation  I personally viewed and interpreted the patient's EKG/Telemetry data  Discussed with ED provider.    Lab Results (last 24 hours)       Procedure Component Value Units Date/Time    CBC & Differential [[id number]]  (Abnormal) Collected: 08/28/24 2001    Specimen: Blood Updated: 08/28/24 2016    Narrative:      The following orders were created for panel order CBC & Differential.  Procedure                               Abnormality         Status                     ---------                               -----------         ------                     CBC Auto Differential[[id number]]        Abnormal            Final result                 Please view results for these tests on the individual orders.    BNP [[id number]]  (Normal) Collected: 08/28/24 2001    Specimen: Blood Updated: 08/28/24 2038     proBNP <36.0 pg/mL     Narrative:      This assay is used as an aid in the diagnosis of individuals suspected of having heart failure. It can be used as an aid in the diagnosis of acute decompensated heart failure (ADHF) in patients presenting with signs and symptoms of ADHF to the emergency department (ED). In addition, NT-proBNP of <300 pg/mL indicates ADHF is not likely.    Age Range Result Interpretation  NT-proBNP Concentration (pg/mL:      <50             Positive            >450                   Gray                 300-450                    Negative             <300    50-75           Positive            >900                  Gray                300-900                  Negative            <300      >75              Positive            >1800                  Gray                300-1800                  Negative            <300    CBC Auto Differential [[id number]]  (Abnormal) Collected: 08/28/24 2001    Specimen: Blood Updated: 08/28/24 2016     WBC 5.73 10*3/mm3      RBC 5.36 10*6/mm3      Hemoglobin 17.2 g/dL      Hematocrit 50.5 %      MCV 94.2 fL      MCH 32.1 pg      MCHC 34.1 g/dL      RDW 13.4 %      RDW-SD 46.3 fl      MPV 10.8 fL      Platelets 247 10*3/mm3      Neutrophil % 60.5 %      Lymphocyte % 31.1 %      Monocyte % 4.7 %      Eosinophil % 2.1 %      Basophil % 1.4 %      Immature Grans % 0.2 %      Neutrophils, Absolute 3.47 10*3/mm3      Lymphocytes, Absolute 1.78 10*3/mm3      Monocytes, Absolute 0.27 10*3/mm3      Eosinophils, Absolute 0.12 10*3/mm3      Basophils, Absolute 0.08 10*3/mm3      Immature Grans, Absolute 0.01 10*3/mm3      nRBC 0.0 /100 WBC     Ethanol [[id number]]  (Abnormal) Collected: 08/28/24 2001    Specimen: Blood Updated: 08/28/24 2038     Ethanol 238 mg/dL      Ethanol % 0.238 %     Comprehensive Metabolic Panel [[id number]]  (Abnormal) Collected: 08/28/24 2105    Specimen: Blood Updated: 08/28/24 2136     Glucose 183 mg/dL      BUN 14 mg/dL      Creatinine 0.75 mg/dL      Sodium 143 mmol/L      Potassium 3.7 mmol/L      Chloride 104 mmol/L      CO2 24.0 mmol/L      Calcium 8.5 mg/dL      Total Protein 7.1 g/dL      Albumin 4.5 g/dL      ALT (SGPT) 118 U/L      AST (SGOT) 61 U/L      Alkaline Phosphatase 74 U/L      Total Bilirubin 0.3 mg/dL      Globulin 2.6 gm/dL      A/G Ratio 1.7 g/dL      BUN/Creatinine Ratio 18.7     Anion Gap 15.0 mmol/L      eGFR 112.7 mL/min/1.73     Narrative:      GFR Normal >60  Chronic Kidney Disease <60  Kidney Failure <15      Single High Sensitivity Troponin T [[id number]]  (Abnormal) Collected: 08/28/24 2105    Specimen: Blood Updated: 08/28/24 2136     HS Troponin T 34 ng/L     Narrative:      High Sensitive Troponin T Reference Range:  <14.0 ng/L-  Negative Female for AMI  <22.0 ng/L- Negative Male for AMI  >=14 - Abnormal Female indicating possible myocardial injury.  >=22 - Abnormal Male indicating possible myocardial injury.   Clinicians would have to utilize clinical acumen, EKG, Troponin, and serial changes to determine if it is an Acute Myocardial Infarction or myocardial injury due to an underlying chronic condition.         Magnesium [[id number]]  (Normal) Collected: 08/28/24 2105    Specimen: Blood Updated: 08/28/24 2136     Magnesium 2.0 mg/dL     Single High Sensitivity Troponin T [[id number]]  (Abnormal) Collected: 08/28/24 2240    Specimen: Blood Updated: 08/28/24 2322     HS Troponin T 24 ng/L     Narrative:      High Sensitive Troponin T Reference Range:  <14.0 ng/L- Negative Female for AMI  <22.0 ng/L- Negative Male for AMI  >=14 - Abnormal Female indicating possible myocardial injury.  >=22 - Abnormal Male indicating possible myocardial injury.   Clinicians would have to utilize clinical acumen, EKG, Troponin, and serial changes to determine if it is an Acute Myocardial Infarction or myocardial injury due to an underlying chronic condition.         Ethanol [[id number]]  (Abnormal) Collected: 08/29/24 0315    Specimen: Blood Updated: 08/29/24 0345     Ethanol 31 mg/dL      Ethanol % 0.031 %             Imaging Results (Last 24 Hours)       ** No results found for the last 24 hours. **            Results for orders placed during the hospital encounter of 06/29/24    Adult Transthoracic Echo Complete With Contrast if Necessary Per Protocol    Interpretation Summary    Left ventricular ejection fraction appears to be 51 - 55%.    The right ventricular cavity is borderline dilated.    Estimated right ventricular systolic pressure from tricuspid regurgitation is normal (<35 mmHg).      ECG 12 Lead Chest Pain   Preliminary Result   HEART RATE=92  bpm   RR Hbpyqxkg=361  ms   ME Kumernpo=301  ms   P Horizontal Axis=-30  deg   P Front Axis=-27  deg   QRSD  Interval=87  ms   QT Strryuzy=023  ms   JCtO=041  ms   QRS Axis=4  deg   T Wave Axis=-3  deg   - BORDERLINE ECG -   Sinus rhythm   Borderline T abnormalities, inferior leads   Date and Time of Study:2024 01:56:30           Assessment/Plan     Active Hospital Problems    Diagnosis  POA    **Alcohol withdrawal [F10.939]  Yes    ROSALINO (generalized anxiety disorder) [F41.1]  Yes    PTSD (post-traumatic stress disorder) [F43.10]  Yes    TBI (traumatic brain injury) [S06.9XAA]  Yes    Primary hypertension [I10]  Yes    Coronary artery disease involving native coronary artery with unstable angina pectoris [I25.110]  Yes    Type 2 diabetes mellitus with hyperglycemia, with long-term current use of insulin [E11.65, Z79.4]  Not Applicable    Seizure disorder [G40.909]  Yes     Mr. Edmonds is a 44-year-old male with history of MI, PTSD, TBI, seizures, alcohol abuse who presents to the emergency room with alcohol intoxication.    Alcohol withdrawal  -Initiate CIWA protocol  -Started on thiamine and multivitamin  -Telemetry unit for monitoring   -neurochecks every 4 hours  -Consult access center  -Repeat CBC, BMP in a.m.  -Seizure precautions    Hypertension/CAD  -Chronic conditions  -Continue home medications when med rec complete    Type 2 diabetes  -Accu-Cheks before meals and at bedtime with correctional dose insulin  -Hold oral diabetic medications    I discussed the patient's findings and my recommendations with patient.    VTE Prophylaxis - SCDs.  Code Status - Full code.       PAULO Schwartz  [facility]  24  04:35 EDT     Electronically signed by Temo Monique MD at 24 1319          Physician Progress Notes (last 24 hours)        Rambo Niño MD at 24 1414              [facility] Medicine Services  PROGRESS NOTE    Patient Name: Bryant Edmonds  : 1980  MRN: [id number]    Date of Admission: 2024  Primary Care Physician: Provider, No  Known    Subjective   Subjective     CC:  Follow-up alcohol withdrawal    Subjective: Patient is having some dizziness and reports some nausea this morning.  He does not feel ready to discharge.  He believes he will be ready to go to rehab tomorrow.  He is planning to go to [place].  I discussed with his family whether it was realistic that he could get to [place].  They said they will talk to him further and make discussions about whether they can find some sort of transportation.  I also suggested we give him some local rehab options to see if these would be reasonable.  I reviewed patient's laboratory studies and vital signs and findings thus far.  I discussed his treatment thus far.  Patient seemed to have good understanding of his treatment plan.    Review of Systems  No current fevers or chills  No current shortness of breath or cough  No current chest pain or palpitations       Objective   Objective     Vital Signs:   Temp:  [97.2 °F (36.2 °C)-98.1 °F (36.7 °C)] 98 °F (36.7 °C)  Heart Rate:  [66-75] 66  Resp:  [16] 16  BP: (129-169)/(64-89) 129/68        Physical Exam:  Constitutional:Awake, alert  HENT: NCAT, mucous membranes moist, neck supple  Respiratory: No cough or wheezes, normal respirations, nonlabored breathing   Cardiovascular: Pulse rate is normal, palpable radial pulses  Gastrointestinal:  soft, nontender, nondistended  Musculoskeletal: Normal musculature for age, no lower extremity edema  Psychiatric: Appropriate affect, cooperative, conversational  Neurologic: No slurred speech or facial droop, follows commands  Skin: No rashes or jaundice, warm      Results Reviewed:  Results from last 7 days   Lab Units 09/03/24  0735 09/02/24  0453 09/01/24  0556   WBC 10*3/mm3 6.70 4.58 4.70   HEMOGLOBIN g/dL 16.0 16.2 15.8   HEMATOCRIT % 47.2 47.3 46.5   PLATELETS 10*3/mm3 158 148 143     Results from last 7 days   Lab Units 09/03/24  0735 09/02/24  0453 09/01/24  0556 08/31/24  1138 08/31/24  0930  08/31/24  0559 08/29/24  0520 08/28/24  2240 08/28/24  2105 08/28/24 2001   SODIUM mmol/L 134* 137 136  --   --  138   < >  --    < >  --    POTASSIUM mmol/L 4.3 4.0 4.2  --   --  4.0   < >  --    < >  --    CHLORIDE mmol/L 102 105 103  --   --  103   < >  --    < >  --    CO2 mmol/L 25.0 22.0 22.0  --   --  25.5   < >  --    < >  --    BUN mg/dL 14 11 12  --   --  11   < >  --    < >  --    CREATININE mg/dL 0.79 0.75* 0.62*  --   --  0.76   < >  --    < >  --    GLUCOSE mg/dL 114* 131* 162*  --   --  155*   < >  --    < >  --    CALCIUM mg/dL 9.1 9.0 8.9  --   --  9.1   < >  --    < >  --    ALK PHOS U/L  --  61 60  --   --  59   < >  --    < >  --    ALT (SGPT) U/L  --  54* 58*  --   --  66*   < >  --    < >  --    AST (SGOT) U/L  --  30 33  --   --  30   < >  --    < >  --    HSTROP T ng/L  --   --   --  20 20  --   --  24*   < >  --    PROBNP pg/mL  --   --   --   --   --   --   --   --   --  <36.0    < > = values in this interval not displayed.     Estimated Creatinine Clearance: 131.1 mL/min (by C-G formula based on SCr of 0.79 mg/dL).    Results for orders placed during the hospital encounter of 06/29/24    Adult Transthoracic Echo Complete With Contrast if Necessary Per Protocol    Interpretation Summary    Left ventricular ejection fraction appears to be 51 - 55%.    The right ventricular cavity is borderline dilated.    Estimated right ventricular systolic pressure from tricuspid regurgitation is normal (<35 mmHg).      I have reviewed the medications:  Scheduled Meds:aspirin, 81 mg, Oral, Daily  enoxaparin, 40 mg, Subcutaneous, Q24H  famotidine, 20 mg, Oral, Once  folic acid, 1 mg, Oral, Daily  insulin lispro, 2-7 Units, Subcutaneous, 4x Daily AC & at Bedtime  losartan, 50 mg, Oral, Q24H  nebivolol, 10 mg, Oral, Daily  pantoprazole, 40 mg, Oral, Daily With Breakfast & Dinner  phenytoin ER, 100 mg, Oral, Q6H  sertraline, 50 mg, Oral, Daily  sodium chloride, 10 mL, Intravenous, Q12H  thiamine, 100 mg,  Oral, Daily      Continuous Infusions:   PRN Meds:.  acetaminophen **OR** acetaminophen **OR** acetaminophen    senna-docusate sodium **AND** polyethylene glycol **AND** bisacodyl **AND** bisacodyl    dextrose    dextrose    famotidine    glucagon (human recombinant)    Magnesium Standard Dose Replacement - Follow Nurse / BPA Driven Protocol    nitroglycerin    ondansetron    [COMPLETED] Insert Peripheral IV **AND** sodium chloride    sodium chloride    sodium chloride    Assessment & Plan   Assessment & Plan     Active Hospital Problems    Diagnosis  POA    **Alcohol withdrawal [F10.939]  Yes    ROSALINO (generalized anxiety disorder) [F41.1]  Yes    PTSD (post-traumatic stress disorder) [F43.10]  Yes    TBI (traumatic brain injury) [S06.9XAA]  Yes    Primary hypertension [I10]  Yes    Coronary artery disease involving native coronary artery with unstable angina pectoris [I25.110]  Yes    Type 2 diabetes mellitus with hyperglycemia, with long-term current use of insulin [E11.65, Z79.4]  Not Applicable    Seizure disorder [G40.909]  Yes      Resolved Hospital Problems   No resolved problems to display.        Brief Hospital Course to date:  Bryant Edmonds is a 44 y.o. male presents the hospital with alcohol withdrawal    Discussion/plan for today: All medical problems are new under my management today.  Alcohol withdrawal seems to be resolved.  Lorazepam discontinued.  Long half-life of phenobarbital should allow for maintained withdrawal.  Zofran added as needed for nausea.  No witnessed vomiting.  Patient tolerating oral intake.  IV fluid discontinued and patient monitored off IV fluid.  Blood pressure currently well-controlled.  Recent laboratory studies are very reassuring.  I have requested that case management and access team to provide patient with a local resources for sobriety including local rehabs as patient plans to go to a rehab in [place] however I am uncertain if this is realistic as family has not yet  looked into ways for transportation.  I have asked family to discuss with patient realistic plans for discharge and realistic plans for sobriety.  Case discussed with nursing as well.  Glucose reviewed and continue correction insulin.  Faisal reviewed and it is noted that patient received prescription Norco, Xanax, and pregabalin for 1 week back on 8/21.  At this point I feel the risks of these medications outweigh the benefits.  I would recommend patient discontinue these medications unless he has a long-term psychiatrist recommending them long-term for his acute issues as if he starts the short-term and does not have a continuing provider he will be at high risk for withdrawal in the future.  Patient is understanding of my recommendations.  Anticipate discharge tomorrow as patient is reaching stability pending no further setbacks and discharge plan.    Alcohol intoxication  History of alcohol withdrawal seizures  CIWA protocol with phenobaribtal  On IV thiamine and folic acid      Chest wall tenderness  Troponin and EKG wnl  Reports multiple falls  Obtain CT chest without contrast to evaluate for rib fractures- negative     Coronary artery disease  Hypertension  On aspirin, aspirin, statin, Bystolic, losartan     Type 2 diabetes  SSI     Generalized anxiety disorder  SSRI        Lovenox 40 mg SC daily for DVT prophylaxis.  Full code.  Discussed with patient and nursing staff.  Anticipate discharge home tomorrow    CODE STATUS:   Code Status and Medical Interventions: CPR (Attempt to Resuscitate); Full Support   Ordered at: 08/29/24 1899     Code Status (Patient has no pulse and is not breathing):    CPR (Attempt to Resuscitate)     Medical Interventions (Patient has pulse or is breathing):    Full Support       Rambo Niño MD  09/03/24      Electronically signed by Rambo Niño MD at 09/03/24 1421          Discharge Summary        Rambo Niño MD at 09/04/24 1027               [facility] Medicine Services  DISCHARGE SUMMARY    Patient Name: Bryant Edmonds  : 1980  MRN: [id number]    Date of Admission: 2024  1:56 AM  Date of Discharge: 2024        Hospital Course       Active Hospital Problems    Diagnosis  POA    Alcohol dependence with withdrawal [F10.239]  Yes    ROSALINO (generalized anxiety disorder) [F41.1]  Yes    PTSD (post-traumatic stress disorder) [F43.10]  Yes    TBI (traumatic brain injury) [S06.9XAA]  Yes    Primary hypertension [I10]  Yes    Coronary artery disease involving native coronary artery with unstable angina pectoris [I25.110]  Yes    Type 2 diabetes mellitus with hyperglycemia, with long-term current use of insulin [E11.65, Z79.4]  Not Applicable    Seizure disorder [G40.909]  Yes      Resolved Hospital Problems    Diagnosis Date Resolved POA    **Alcohol withdrawal [F10.939] 2024 Yes          Hospital Course:  Bryant Edmonds is a 44 y.o. male presents the hospital with alcohol withdrawal     Discussion/plan for today: Alcohol withdrawal seems to be resolved.  Lorazepam discontinued.  Recent laboratory studies are very reassuring.  I have requested that case management and access team to provide patient with a local resources for sobriety as patient was originally planning to go to [place] and now he states he is planning to go to [place] and reports his family is going to take him.  I strongly encourage a substance abuse rehab facility.  Faisal reviewed and it is noted that patient received prescription Norco, Xanax, and pregabalin for 1 week total supply, on the date of .  I would recommend he avoid these controlled substances in the future unless he is under the management of a long-term psychiatrist who can continue these long-term.  I have discussed my recommendations with the patient.  Patient is currently stable and cleared to discharge today.    Alcohol intoxication  History of  alcohol withdrawal seizures  CIWA protocol was provided and withdrawal is now resolved.  Patient did receive our phenobarbital protocol earlier in the hospital stay.  Prolonged half-life should allow for gradual taper of this medication.  Patient instructed not to drive at this current time until he is cleared by primary care provider.  Patient received vitamins while hospitalized as part of our withdrawal protocol.  Home dose Dilantin continued.     Chest wall tenderness  Troponin and EKG wnl  Reports multiple falls  CT chest completed and did not show rib fractures.  Suspect musculoskeletal injury.  Improved.  No further complaint of wall tenderness     Coronary artery disease  Hypertension  On aspirin, aspirin, statin, Bystolic, losartan, control no current symptoms     Type 2 diabetes  A1c 7.5.  Was placed on correction insulin while hospitalized and will be discharged on low-dose metformin extended release.     Generalized anxiety disorder  SSRI, encouragement provided.  Patient was followed by psychiatry access team.        Day of Discharge     Subjective: Patient says he feels much better today.  He is eager to leave.  He has changes mind and is not going to [place].  He says his family is going to pick him up and take him to an alcohol rehab and [place].  He denies any further withdrawal symptoms and is pleased with his progress.  He says he is motivated to quit alcohol.  No new complaints    Vital Signs:   Temp:  [97.9 °F (36.6 °C)-98.3 °F (36.8 °C)] 97.9 °F (36.6 °C)  Heart Rate:  [67-77] 67  Resp:  [16-20] 16  BP: (129-154)/() 131/76     Physical Exam:  Constitutional:Awake, alert  HENT: NCAT, mucous membranes moist, neck supple  Respiratory: No cough or wheezes, normal respirations, nonlabored breathing   Cardiovascular: Pulse rate is normal, palpable radial pulses  Gastrointestinal:  soft, nontender, nondistended  Musculoskeletal: Normal musculature for age, no lower extremity  "edema  Psychiatric: Appropriate affect, cooperative, conversational  Neurologic: No slurred speech or facial droop, follows commands  Skin: No rashes or jaundice, warm    Pertinent  and/or Most Recent Results     Results from last 7 days   Lab Units 09/03/24 0735 09/02/24 0453 09/01/24  0556 08/31/24  0559 08/31/24  0558 08/30/24 0456 08/29/24 0520 08/28/24 2105 08/28/24 2001   WBC 10*3/mm3 6.70 4.58 4.70  --  4.06 3.81 4.62  --  5.73   HEMOGLOBIN g/dL 16.0 16.2 15.8  --  15.7 15.4 15.1  --  17.2   HEMATOCRIT % 47.2 47.3 46.5  --  45.9 44.8 44.2  --  50.5   PLATELETS 10*3/mm3 158 148 143  --  151 161 182  --  247   SODIUM mmol/L 134* 137 136 138  --  136 138 143  --    POTASSIUM mmol/L 4.3 4.0 4.2 4.0  --  3.6 3.6 3.7  --    CHLORIDE mmol/L 102 105 103 103  --  103 105 104  --    CO2 mmol/L 25.0 22.0 22.0 25.5  --  21.2* 21.0* 24.0  --    BUN mg/dL 14 11 12 11  --  9 13 14  --    CREATININE mg/dL 0.79 0.75* 0.62* 0.76  --  0.67* 0.63* 0.75*  --    GLUCOSE mg/dL 114* 131* 162* 155*  --  182* 183* 183*  --    CALCIUM mg/dL 9.1 9.0 8.9 9.1  --  8.5* 8.2* 8.5*  --      Results from last 7 days   Lab Units 09/02/24 0453 09/01/24  0556 08/31/24 0559 08/30/24 0456 08/28/24 2105   BILIRUBIN mg/dL 0.4 0.4 0.5 0.7 0.3   ALK PHOS U/L 61 60 59 60 74   ALT (SGPT) U/L 54* 58* 66* 81* 118*   AST (SGOT) U/L 30 33 30 48* 61*           Invalid input(s): \"TG\", \"LDLCALC\", \"LDLREALC\"  Results from last 7 days   Lab Units 08/31/24  1138 08/31/24  0930 08/31/24  0558 08/28/24  2240 08/28/24  2105 08/28/24 2001   HEMOGLOBIN A1C %  --   --  7.50*  --   --   --    PROBNP pg/mL  --   --   --   --   --  <36.0   HSTROP T ng/L 20 20  --  24* 34*  --        Brief Urine Lab Results  (Last result in the past 365 days)        Color   Clarity   Blood   Leuk Est   Nitrite   Protein   CREAT   Urine HCG        08/30/24 2257 Yellow   Clear   Negative   Negative   Negative   Negative                   Microbiology Results Abnormal       None "            Imaging Results (All)       Procedure Component Value Units Date/Time    CT Chest Without Contrast Diagnostic [[id number]] Collected: 09/01/24 1150     Updated: 09/01/24 1157    Narrative:      CT CHEST WO CONTRAST DIAGNOSTIC-     DATE OF EXAM: 9/1/2024 11:11 AM     INDICATION: Chest pain. Evaluate rib fractures. Alcohol withdrawal.     COMPARISON: Chest radiographs 8/28/2024 and 6/29/2024. CT chest  6/29/2024.     TECHNIQUE: Multiple contiguous axial images were acquired through the  chest without the intravenous administration of contrast. Reformatted  coronal and sagittal sequences were also reviewed. Radiation dose  reduction techniques were utilized, including automated exposure control  and exposure modulation based on body size.     FINDINGS:  Low lung volumes with mild bilateral dependent atelectasis and mild  multifocal bibasilar subsegmental atelectasis and/or scarring. Lungs  otherwise clear. The central airways are widely patent. No pneumothorax  or pleural effusion.     The heart and great vessels of the chest are normal in size. Suspected  mild calcified coronary artery disease. Trace pericardial fluid. No  pathologically enlarged intrathoracic lymph nodes are identified.     Limited noncontrast CT imaging of the upper abdomen is unremarkable.     Bilateral gynecomastia. No acute osseous abnormality or concerning  osseous lesion.       Impression:      No acute intrathoracic abnormality.     This report was finalized on 9/1/2024 11:53 AM by Walt Campuzano MD on  Workstation: [id number]                       Results for orders placed during the hospital encounter of 06/29/24    Adult Transthoracic Echo Complete With Contrast if Necessary Per Protocol    Interpretation Summary    Left ventricular ejection fraction appears to be 51 - 55%.    The right ventricular cavity is borderline dilated.    Estimated right ventricular systolic pressure from tricuspid regurgitation is normal (<35  mmHg).        Discharge Details        Discharge Medications        New Medications        Instructions Start Date   acetaminophen 325 MG tablet  Commonly known as: TYLENOL   650 mg, Oral, Every 6 Hours PRN      famotidine 10 MG tablet  Commonly known as: Pepcid AC   10 mg, Oral, 2 Times Daily PRN      metFORMIN  MG 24 hr tablet  Commonly known as: GLUCOPHAGE-XR   500 mg, Oral, Daily With Breakfast             Changes to Medications        Instructions Start Date   phenytoin  MG capsule  Commonly known as: DILANTIN  What changed: when to take this   100 mg, Oral, Every 6 Hours Scheduled             Continue These Medications        Instructions Start Date   aspirin 81 MG chewable tablet   81 mg, Oral, Daily      atorvastatin 20 MG tablet  Commonly known as: LIPITOR   20 mg, Oral, Nightly      losartan 50 MG tablet  Commonly known as: COZAAR   50 mg, Oral, Every 24 Hours Scheduled      multivitamin with minerals tablet tablet   1 tablet, Oral, Daily      nebivolol 10 MG tablet  Commonly known as: BYSTOLIC   10 mg, Oral, Daily      sertraline 50 MG tablet  Commonly known as: ZOLOFT   50 mg      thiamine 100 MG tablet  Commonly known as: VITAMIN B1   100 mg, Oral, Daily      VISTARIL PO   Oral             Stop These Medications      ALPRAZolam 1 MG tablet  Commonly known as: XANAX              No Known Allergies      Discharge Disposition:  Home or Self Care    Diet:  Hospital:  Diet Order   Procedures    Diet: Diabetic; Consistent Carbohydrate; Fluid Consistency: Thin (IDDSI 0)       Activity:  Activity Instructions       Activity as Tolerated                   CODE STATUS:    Code Status and Medical Interventions: CPR (Attempt to Resuscitate); Full Support   Ordered at: 08/29/24 0433     Code Status (Patient has no pulse and is not breathing):    CPR (Attempt to Resuscitate)     Medical Interventions (Patient has pulse or is breathing):    Full Support       Additional Instructions for the Follow-ups that  You Need to Schedule       Discharge Follow-up with PCP   As directed       Currently Documented PCP:    Provider, No Known    PCP Phone Number:    [phone]     Follow Up Details: recommend a primary care provider follow-up within 1 week for general hospital follow-up.  Please call to schedule or speak with the  before you leave the hospital if you do not have a local primary care provider        Discharge Follow-up with Specified Provider: Recommend alcohol rehabilitation as discussed.; Today   As directed      To: Recommend alcohol rehabilitation as discussed.   Follow Up: Today               Follow-up Information       Provider, No Known .    Why: recommend a primary care provider follow-up within 1 week for general hospital follow-up.  Please call to schedule or speak with the  before you leave the hospital if you do not have a local primary care provider  Contact information:  [facility] [address]  [phone]                                 Rambo Niño MD  09/04/24      Time Spent on Discharge:  I spent greater than 35 minutes on this discharge activity which included: face-to-face encounter with the patient, reviewing the data in the system, coordination of the care with the nursing staff as well as consultants, documentation, and entering orders.        Electronically signed by Rambo Niño MD at 09/04/24 3661

## 2024-09-04 NOTE — DISCHARGE SUMMARY
[facility] Medicine Services  DISCHARGE SUMMARY    Patient Name: Bryant Edmonds  : 1980  MRN: [id number]    Date of Admission: 2024  1:56 AM  Date of Discharge: 2024        Hospital Course       Active Hospital Problems    Diagnosis  POA    Alcohol dependence with withdrawal [F10.239]  Yes    ROSALINO (generalized anxiety disorder) [F41.1]  Yes    PTSD (post-traumatic stress disorder) [F43.10]  Yes    TBI (traumatic brain injury) [S06.9XAA]  Yes    Primary hypertension [I10]  Yes    Coronary artery disease involving native coronary artery with unstable angina pectoris [I25.110]  Yes    Type 2 diabetes mellitus with hyperglycemia, with long-term current use of insulin [E11.65, Z79.4]  Not Applicable    Seizure disorder [G40.909]  Yes      Resolved Hospital Problems    Diagnosis Date Resolved POA    **Alcohol withdrawal [F10.939] 2024 Yes          Hospital Course:  Bryant Edmonds is a 44 y.o. male presents the hospital with alcohol withdrawal     Discussion/plan for today: Alcohol withdrawal seems to be resolved.  Lorazepam discontinued.  Recent laboratory studies are very reassuring.  I have requested that case management and access team to provide patient with a local resources for sobriety as patient was originally planning to go to [place] and now he states he is planning to go to [place] facility and reports his family is going to take him.  I strongly encourage a substance abuse rehab facility.  Faisal reviewed and it is noted that patient received prescription Norco, Xanax, and pregabalin for 1 week total supply, on the date of .  I would recommend he avoid these controlled substances in the future unless he is under the management of a long-term psychiatrist who can continue these long-term.  I have discussed my recommendations with the patient.  Patient is currently stable and cleared to discharge today.    Alcohol intoxication  History of  alcohol withdrawal seizures  CIWA protocol was provided and withdrawal is now resolved.  Patient did receive our phenobarbital protocol earlier in the hospital stay.  Prolonged half-life should allow for gradual taper of this medication.  Patient instructed not to drive at this current time until he is cleared by primary care provider.  Patient received vitamins while hospitalized as part of our withdrawal protocol.  Home dose Dilantin continued.     Chest wall tenderness  Troponin and EKG wnl  Reports multiple falls  CT chest completed and did not show rib fractures.  Suspect musculoskeletal injury.  Improved.  No further complaint of wall tenderness     Coronary artery disease  Hypertension  On aspirin, aspirin, statin, Bystolic, losartan, control no current symptoms     Type 2 diabetes  A1c 7.5.  Was placed on correction insulin while hospitalized and will be discharged on low-dose metformin extended release.     Generalized anxiety disorder  SSRI, encouragement provided.  Patient was followed by psychiatry access team.        Day of Discharge     Subjective: Patient says he feels much better today.  He is eager to leave.  He has changes mind and is not going to [place].  He says his family is going to pick him up and take him to an alcohol rehab and [place].  He denies any further withdrawal symptoms and is pleased with his progress.  He says he is motivated to quit alcohol.  No new complaints    Vital Signs:   Temp:  [97.9 °F (36.6 °C)-98.3 °F (36.8 °C)] 97.9 °F (36.6 °C)  Heart Rate:  [67-77] 67  Resp:  [16-20] 16  BP: (129-154)/() 131/76     Physical Exam:  Constitutional:Awake, alert  HENT: NCAT, mucous membranes moist, neck supple  Respiratory: No cough or wheezes, normal respirations, nonlabored breathing   Cardiovascular: Pulse rate is normal, palpable radial pulses  Gastrointestinal:  soft, nontender, nondistended  Musculoskeletal: Normal musculature for age, no lower extremity  "edema  Psychiatric: Appropriate affect, cooperative, conversational  Neurologic: No slurred speech or facial droop, follows commands  Skin: No rashes or jaundice, warm    Pertinent  and/or Most Recent Results     Results from last 7 days   Lab Units 09/03/24 0735 09/02/24 0453 09/01/24  0556 08/31/24  0559 08/31/24  0558 08/30/24 0456 08/29/24 0520 08/28/24 2105 08/28/24 2001   WBC 10*3/mm3 6.70 4.58 4.70  --  4.06 3.81 4.62  --  5.73   HEMOGLOBIN g/dL 16.0 16.2 15.8  --  15.7 15.4 15.1  --  17.2   HEMATOCRIT % 47.2 47.3 46.5  --  45.9 44.8 44.2  --  50.5   PLATELETS 10*3/mm3 158 148 143  --  151 161 182  --  247   SODIUM mmol/L 134* 137 136 138  --  136 138 143  --    POTASSIUM mmol/L 4.3 4.0 4.2 4.0  --  3.6 3.6 3.7  --    CHLORIDE mmol/L 102 105 103 103  --  103 105 104  --    CO2 mmol/L 25.0 22.0 22.0 25.5  --  21.2* 21.0* 24.0  --    BUN mg/dL 14 11 12 11  --  9 13 14  --    CREATININE mg/dL 0.79 0.75* 0.62* 0.76  --  0.67* 0.63* 0.75*  --    GLUCOSE mg/dL 114* 131* 162* 155*  --  182* 183* 183*  --    CALCIUM mg/dL 9.1 9.0 8.9 9.1  --  8.5* 8.2* 8.5*  --      Results from last 7 days   Lab Units 09/02/24 0453 09/01/24  0556 08/31/24 0559 08/30/24 0456 08/28/24 2105   BILIRUBIN mg/dL 0.4 0.4 0.5 0.7 0.3   ALK PHOS U/L 61 60 59 60 74   ALT (SGPT) U/L 54* 58* 66* 81* 118*   AST (SGOT) U/L 30 33 30 48* 61*           Invalid input(s): \"TG\", \"LDLCALC\", \"LDLREALC\"  Results from last 7 days   Lab Units 08/31/24  1138 08/31/24  0930 08/31/24  0558 08/28/24  2240 08/28/24  2105 08/28/24 2001   HEMOGLOBIN A1C %  --   --  7.50*  --   --   --    PROBNP pg/mL  --   --   --   --   --  <36.0   HSTROP T ng/L 20 20  --  24* 34*  --        Brief Urine Lab Results  (Last result in the past 365 days)        Color   Clarity   Blood   Leuk Est   Nitrite   Protein   CREAT   Urine HCG        08/30/24 2257 Yellow   Clear   Negative   Negative   Negative   Negative                   Microbiology Results Abnormal       None "            Imaging Results (All)       Procedure Component Value Units Date/Time    CT Chest Without Contrast Diagnostic [[id number]] Collected: 09/01/24 1150     Updated: 09/01/24 1157    Narrative:      CT CHEST WO CONTRAST DIAGNOSTIC-     DATE OF EXAM: 9/1/2024 11:11 AM     INDICATION: Chest pain. Evaluate rib fractures. Alcohol withdrawal.     COMPARISON: Chest radiographs 8/28/2024 and 6/29/2024. CT chest  6/29/2024.     TECHNIQUE: Multiple contiguous axial images were acquired through the  chest without the intravenous administration of contrast. Reformatted  coronal and sagittal sequences were also reviewed. Radiation dose  reduction techniques were utilized, including automated exposure control  and exposure modulation based on body size.     FINDINGS:  Low lung volumes with mild bilateral dependent atelectasis and mild  multifocal bibasilar subsegmental atelectasis and/or scarring. Lungs  otherwise clear. The central airways are widely patent. No pneumothorax  or pleural effusion.     The heart and great vessels of the chest are normal in size. Suspected  mild calcified coronary artery disease. Trace pericardial fluid. No  pathologically enlarged intrathoracic lymph nodes are identified.     Limited noncontrast CT imaging of the upper abdomen is unremarkable.     Bilateral gynecomastia. No acute osseous abnormality or concerning  osseous lesion.       Impression:      No acute intrathoracic abnormality.     This report was finalized on 9/1/2024 11:53 AM by Walt Campuzano MD on  Workstation: [id number]                       Results for orders placed during the hospital encounter of 06/29/24    Adult Transthoracic Echo Complete With Contrast if Necessary Per Protocol    Interpretation Summary    Left ventricular ejection fraction appears to be 51 - 55%.    The right ventricular cavity is borderline dilated.    Estimated right ventricular systolic pressure from tricuspid regurgitation is normal (<35  mmHg).        Discharge Details        Discharge Medications        New Medications        Instructions Start Date   acetaminophen 325 MG tablet  Commonly known as: TYLENOL   650 mg, Oral, Every 6 Hours PRN      famotidine 10 MG tablet  Commonly known as: Pepcid AC   10 mg, Oral, 2 Times Daily PRN      metFORMIN  MG 24 hr tablet  Commonly known as: GLUCOPHAGE-XR   500 mg, Oral, Daily With Breakfast             Changes to Medications        Instructions Start Date   phenytoin  MG capsule  Commonly known as: DILANTIN  What changed: when to take this   100 mg, Oral, Every 6 Hours Scheduled             Continue These Medications        Instructions Start Date   aspirin 81 MG chewable tablet   81 mg, Oral, Daily      atorvastatin 20 MG tablet  Commonly known as: LIPITOR   20 mg, Oral, Nightly      losartan 50 MG tablet  Commonly known as: COZAAR   50 mg, Oral, Every 24 Hours Scheduled      multivitamin with minerals tablet tablet   1 tablet, Oral, Daily      nebivolol 10 MG tablet  Commonly known as: BYSTOLIC   10 mg, Oral, Daily      sertraline 50 MG tablet  Commonly known as: ZOLOFT   50 mg      thiamine 100 MG tablet  Commonly known as: VITAMIN B1   100 mg, Oral, Daily      VISTARIL PO   Oral             Stop These Medications      ALPRAZolam 1 MG tablet  Commonly known as: XANAX              No Known Allergies      Discharge Disposition:  Home or Self Care    Diet:  Hospital:  Diet Order   Procedures    Diet: Diabetic; Consistent Carbohydrate; Fluid Consistency: Thin (IDDSI 0)       Activity:  Activity Instructions       Activity as Tolerated                   CODE STATUS:    Code Status and Medical Interventions: CPR (Attempt to Resuscitate); Full Support   Ordered at: 08/29/24 0433     Code Status (Patient has no pulse and is not breathing):    CPR (Attempt to Resuscitate)     Medical Interventions (Patient has pulse or is breathing):    Full Support       Additional Instructions for the Follow-ups that  You Need to Schedule       Discharge Follow-up with PCP   As directed       Currently Documented PCP:    Provider, No Known    PCP Phone Number:    [phone]     Follow Up Details: recommend a primary care provider follow-up within 1 week for general hospital follow-up.  Please call to schedule or speak with the  before you leave the hospital if you do not have a local primary care provider        Discharge Follow-up with Specified Provider: Recommend alcohol rehabilitation as discussed.; Today   As directed      To: Recommend alcohol rehabilitation as discussed.   Follow Up: Today               Follow-up Information       Provider, No Known .    Why: recommend a primary care provider follow-up within 1 week for general hospital follow-up.  Please call to schedule or speak with the  before you leave the hospital if you do not have a local primary care provider  Contact information:  [facility] [address]  [phone]                                 Rambo Niño MD  09/04/24      Time Spent on Discharge:  I spent greater than 35 minutes on this discharge activity which included: face-to-face encounter with the patient, reviewing the data in the system, coordination of the care with the nursing staff as well as consultants, documentation, and entering orders.

## 2024-09-04 NOTE — PLAN OF CARE
Problem: Adult Inpatient Plan of Care  Goal: Absence of Hospital-Acquired Illness or Injury  Intervention: Identify and Manage Fall Risk  Recent Flowsheet Documentation  Taken 9/4/2024 0556 by Veronica Avila RN  Safety Promotion/Fall Prevention: safety round/check completed  Taken 9/4/2024 0400 by Veronica Avila RN  Safety Promotion/Fall Prevention: safety round/check completed  Taken 9/4/2024 0200 by Veronica Avila RN  Safety Promotion/Fall Prevention: safety round/check completed  Taken 9/3/2024 2356 by Veronica Avila RN  Safety Promotion/Fall Prevention: safety round/check completed  Taken 9/3/2024 2200 by Veronica Avila RN  Safety Promotion/Fall Prevention: safety round/check completed  Taken 9/3/2024 1955 by Veronica Avila RN  Safety Promotion/Fall Prevention: safety round/check completed  Intervention: Prevent Skin Injury  Recent Flowsheet Documentation  Taken 9/4/2024 0556 by Veronica Avila RN  Body Position: position changed independently  Taken 9/4/2024 0400 by Veronica Avila RN  Body Position: position changed independently  Taken 9/4/2024 0200 by Veronica Avila RN  Body Position: position changed independently  Taken 9/3/2024 2356 by Veronica Avila RN  Body Position: position changed independently  Taken 9/3/2024 2200 by Veronica Avila RN  Body Position: position changed independently  Taken 9/3/2024 1955 by Veronica Avila RN  Body Position: position changed independently  Intervention: Prevent and Manage VTE (Venous Thromboembolism) Risk  Recent Flowsheet Documentation  Taken 9/4/2024 0556 by Veronica Avila RN  Activity Management: up ad merced  Taken 9/4/2024 0400 by Veronica Avila RN  Activity Management: up ad merced  Taken 9/4/2024 0200 by Veronica Avila RN  Activity Management: up ad merced  Taken 9/3/2024 2356 by Veronica Avila RN  Activity Management: up ad merced  Taken 9/3/2024 2200 by Veronica Avila, RN  Activity Management: up ad merced  Taken 9/3/2024 1955 by Veronica Avila  C, RN  Activity Management: up ad merced  VTE Prevention/Management: patient refused intervention  Range of Motion: ROM (range of motion) performed  Intervention: Prevent Infection  Recent Flowsheet Documentation  Taken 9/4/2024 0556 by Veronica Avila RN  Infection Prevention: environmental surveillance performed  Taken 9/4/2024 0400 by Veronica Avila RN  Infection Prevention: environmental surveillance performed  Taken 9/4/2024 0200 by Veronica Avila RN  Infection Prevention: environmental surveillance performed  Taken 9/3/2024 2356 by Veronica Avila RN  Infection Prevention: environmental surveillance performed  Taken 9/3/2024 2200 by Veronica Avila RN  Infection Prevention: environmental surveillance performed  Taken 9/3/2024 1955 by Veronica Avila RN  Infection Prevention: environmental surveillance performed  Goal: Optimal Comfort and Wellbeing  Intervention: Provide Person-Centered Care  Recent Flowsheet Documentation  Taken 9/3/2024 1955 by Veronica Avila RN  Trust Relationship/Rapport:   care explained   choices provided   Goal Outcome Evaluation:

## 2024-09-04 NOTE — CONSULTS
Visit with patient at bedside. Supportive conversation. Notarized paperwork allowing friend to  his car.

## 2024-09-05 NOTE — CASE MANAGEMENT/SOCIAL WORK
Case Management Discharge Note      Final Note: Pt discharged home.  MARIBELL Rice RN         Selected Continued Care - Discharged on 9/4/2024 Admission date: 8/29/2024 - Discharge disposition: Home or Self Care      Destination    No services have been selected for the patient.                Durable Medical Equipment    No services have been selected for the patient.                Dialysis/Infusion    No services have been selected for the patient.                Home Medical Care    No services have been selected for the patient.                Therapy    No services have been selected for the patient.                Community Resources    No services have been selected for the patient.                Community & DME    No services have been selected for the patient.                         Final Discharge Disposition Code: 01 - home or self-care

## 2024-09-05 NOTE — OUTREACH NOTE
Prep Survey      Flowsheet Row Responses   [facility] facility patient discharged from? [place]   Is LACE score < 7 ? No   Eligibility Not Eligible   What are the reasons patient is not eligible? Behavioral Health   Does the patient have one of the following disease processes/diagnoses(primary or secondary)? Other   Prep survey completed? Yes            KALINA CONDON - Registered Nurse

## 2024-10-07 ENCOUNTER — APPOINTMENT (OUTPATIENT)
Dept: GENERAL RADIOLOGY | Facility: HOSPITAL | Age: 44
End: 2024-10-07
Payer: COMMERCIAL

## 2024-10-07 ENCOUNTER — HOSPITAL ENCOUNTER (EMERGENCY)
Facility: HOSPITAL | Age: 44
Discharge: HOME | End: 2024-10-07
Attending: EMERGENCY MEDICINE | Admitting: EMERGENCY MEDICINE
Payer: COMMERCIAL

## 2024-10-07 VITALS
RESPIRATION RATE: 20 BRPM | BODY MASS INDEX: 29.35 KG/M2 | WEIGHT: 205 LBS | HEART RATE: 101 BPM | HEIGHT: 70 IN | DIASTOLIC BLOOD PRESSURE: 82 MMHG | OXYGEN SATURATION: 94 % | TEMPERATURE: 98.5 F | SYSTOLIC BLOOD PRESSURE: 151 MMHG

## 2024-10-07 DIAGNOSIS — R56.9 GENERALIZED SEIZURE: Primary | ICD-10-CM

## 2024-10-07 DIAGNOSIS — I10 UNCONTROLLED HYPERTENSION: ICD-10-CM

## 2024-10-07 DIAGNOSIS — R45.851 SUICIDAL IDEATION: ICD-10-CM

## 2024-10-07 DIAGNOSIS — F10.929 ALCOHOLIC INTOXICATION WITH COMPLICATION: ICD-10-CM

## 2024-10-07 DIAGNOSIS — R78.89 SUBTHERAPEUTIC PHENYTOIN LEVEL: ICD-10-CM

## 2024-10-07 LAB
ALBUMIN SERPL-MCNC: 4.9 G/DL (ref 3.5–5.2)
ALBUMIN/GLOB SERPL: 1.6 G/DL
ALP SERPL-CCNC: 91 U/L (ref 39–117)
ALT SERPL W P-5'-P-CCNC: 286 U/L (ref 1–41)
AMPHET+METHAMPHET UR QL: NEGATIVE
AMPHETAMINES UR QL: NEGATIVE
ANION GAP SERPL CALCULATED.3IONS-SCNC: 19 MMOL/L (ref 5–15)
AST SERPL-CCNC: 84 U/L (ref 1–40)
BACTERIA UR QL AUTO: ABNORMAL /HPF
BARBITURATES UR QL SCN: NEGATIVE
BASOPHILS # BLD AUTO: 0.05 10*3/MM3 (ref 0–0.2)
BASOPHILS NFR BLD AUTO: 1 % (ref 0–1.5)
BENZODIAZ UR QL SCN: POSITIVE
BILIRUB SERPL-MCNC: 0.3 MG/DL (ref 0–1.2)
BILIRUB UR QL STRIP: NEGATIVE
BUN SERPL-MCNC: 11 MG/DL (ref 6–20)
BUN/CREAT SERPL: 15.3 (ref 7–25)
BUPRENORPHINE SERPL-MCNC: NEGATIVE NG/ML
CALCIUM SPEC-SCNC: 9.5 MG/DL (ref 8.6–10.5)
CANNABINOIDS SERPL QL: NEGATIVE
CHLORIDE SERPL-SCNC: 106 MMOL/L (ref 98–107)
CLARITY UR: CLEAR
CO2 SERPL-SCNC: 22 MMOL/L (ref 22–29)
COCAINE UR QL: NEGATIVE
COLOR UR: YELLOW
CREAT SERPL-MCNC: 0.72 MG/DL (ref 0.76–1.27)
D-LACTATE SERPL-SCNC: 3.4 MMOL/L (ref 0.5–2)
D-LACTATE SERPL-SCNC: 5.2 MMOL/L (ref 0.5–2)
DEPRECATED RDW RBC AUTO: 40.5 FL (ref 37–54)
EGFRCR SERPLBLD CKD-EPI 2021: 114.1 ML/MIN/1.73
EOSINOPHIL # BLD AUTO: 0.19 10*3/MM3 (ref 0–0.4)
EOSINOPHIL NFR BLD AUTO: 3.9 % (ref 0.3–6.2)
ERYTHROCYTE [DISTWIDTH] IN BLOOD BY AUTOMATED COUNT: 12.1 % (ref 12.3–15.4)
ETHANOL BLD-MCNC: 103 MG/DL (ref 0–10)
ETHANOL BLD-MCNC: 239 MG/DL (ref 0–10)
FENTANYL UR-MCNC: NEGATIVE NG/ML
GLOBULIN UR ELPH-MCNC: 3 GM/DL
GLUCOSE BLDC GLUCOMTR-MCNC: 158 MG/DL (ref 70–130)
GLUCOSE BLDC GLUCOMTR-MCNC: 70 MG/DL (ref 70–130)
GLUCOSE SERPL-MCNC: 74 MG/DL (ref 65–99)
GLUCOSE UR STRIP-MCNC: ABNORMAL MG/DL
HCT VFR BLD AUTO: 50.5 % (ref 37.5–51)
HGB BLD-MCNC: 17.9 G/DL (ref 13–17.7)
HGB UR QL STRIP.AUTO: NEGATIVE
HOLD SPECIMEN: NORMAL
HOLD SPECIMEN: NORMAL
HYALINE CASTS UR QL AUTO: ABNORMAL /LPF
IMM GRANULOCYTES # BLD AUTO: 0.02 10*3/MM3 (ref 0–0.05)
IMM GRANULOCYTES NFR BLD AUTO: 0.4 % (ref 0–0.5)
KETONES UR QL STRIP: NEGATIVE
LEUKOCYTE ESTERASE UR QL STRIP.AUTO: ABNORMAL
LYMPHOCYTES # BLD AUTO: 1.96 10*3/MM3 (ref 0.7–3.1)
LYMPHOCYTES NFR BLD AUTO: 40.5 % (ref 19.6–45.3)
MAGNESIUM SERPL-MCNC: 1.9 MG/DL (ref 1.6–2.6)
MCH RBC QN AUTO: 32.1 PG (ref 26.6–33)
MCHC RBC AUTO-ENTMCNC: 35.4 G/DL (ref 31.5–35.7)
MCV RBC AUTO: 90.7 FL (ref 79–97)
METHADONE UR QL SCN: NEGATIVE
MONOCYTES # BLD AUTO: 0.39 10*3/MM3 (ref 0.1–0.9)
MONOCYTES NFR BLD AUTO: 8.1 % (ref 5–12)
NEUTROPHILS NFR BLD AUTO: 2.23 10*3/MM3 (ref 1.7–7)
NEUTROPHILS NFR BLD AUTO: 46.1 % (ref 42.7–76)
NITRITE UR QL STRIP: NEGATIVE
NRBC BLD AUTO-RTO: 0 /100 WBC (ref 0–0.2)
OPIATES UR QL: NEGATIVE
OXYCODONE UR QL SCN: NEGATIVE
PCP UR QL SCN: NEGATIVE
PH UR STRIP.AUTO: 6.5 [PH] (ref 5–8)
PHENYTOIN SERPL-MCNC: <0.8 MCG/ML (ref 10–20)
PLATELET # BLD AUTO: 212 10*3/MM3 (ref 140–450)
PMV BLD AUTO: 11.1 FL (ref 6–12)
POTASSIUM SERPL-SCNC: 3.3 MMOL/L (ref 3.5–5.2)
PROCALCITONIN SERPL-MCNC: 0.07 NG/ML (ref 0–0.25)
PROT SERPL-MCNC: 7.9 G/DL (ref 6–8.5)
PROT UR QL STRIP: ABNORMAL
RBC # BLD AUTO: 5.57 10*6/MM3 (ref 4.14–5.8)
RBC # UR STRIP: ABNORMAL /HPF
REF LAB TEST METHOD: ABNORMAL
SODIUM SERPL-SCNC: 147 MMOL/L (ref 136–145)
SP GR UR STRIP: 1.01 (ref 1–1.03)
SQUAMOUS #/AREA URNS HPF: ABNORMAL /HPF
TRICYCLICS UR QL SCN: NEGATIVE
TROPONIN T SERPL HS-MCNC: 47 NG/L
UROBILINOGEN UR QL STRIP: ABNORMAL
WBC # UR STRIP: ABNORMAL /HPF
WBC NRBC COR # BLD AUTO: 4.84 10*3/MM3 (ref 3.4–10.8)
WHOLE BLOOD HOLD COAG: NORMAL
WHOLE BLOOD HOLD SPECIMEN: NORMAL

## 2024-10-07 PROCEDURE — 84145 PROCALCITONIN (PCT): CPT | Performed by: EMERGENCY MEDICINE

## 2024-10-07 PROCEDURE — 96374 THER/PROPH/DIAG INJ IV PUSH: CPT

## 2024-10-07 PROCEDURE — 96375 TX/PRO/DX INJ NEW DRUG ADDON: CPT

## 2024-10-07 PROCEDURE — 80053 COMPREHEN METABOLIC PANEL: CPT | Performed by: EMERGENCY MEDICINE

## 2024-10-07 PROCEDURE — 80307 DRUG TEST PRSMV CHEM ANLYZR: CPT | Performed by: EMERGENCY MEDICINE

## 2024-10-07 PROCEDURE — 80185 ASSAY OF PHENYTOIN TOTAL: CPT | Performed by: EMERGENCY MEDICINE

## 2024-10-07 PROCEDURE — 87086 URINE CULTURE/COLONY COUNT: CPT | Performed by: EMERGENCY MEDICINE

## 2024-10-07 PROCEDURE — 82077 ASSAY SPEC XCP UR&BREATH IA: CPT | Performed by: EMERGENCY MEDICINE

## 2024-10-07 PROCEDURE — 25010000002 LEVETRIRACETAM PER 10 MG: Performed by: EMERGENCY MEDICINE

## 2024-10-07 PROCEDURE — 25010000002 KETOROLAC TROMETHAMINE PER 15 MG: Performed by: EMERGENCY MEDICINE

## 2024-10-07 PROCEDURE — 82948 REAGENT STRIP/BLOOD GLUCOSE: CPT

## 2024-10-07 PROCEDURE — 84484 ASSAY OF TROPONIN QUANT: CPT | Performed by: EMERGENCY MEDICINE

## 2024-10-07 PROCEDURE — 36415 COLL VENOUS BLD VENIPUNCTURE: CPT

## 2024-10-07 PROCEDURE — 25010000002 LORAZEPAM PER 2 MG

## 2024-10-07 PROCEDURE — 81001 URINALYSIS AUTO W/SCOPE: CPT | Performed by: EMERGENCY MEDICINE

## 2024-10-07 PROCEDURE — 93005 ELECTROCARDIOGRAM TRACING: CPT | Performed by: EMERGENCY MEDICINE

## 2024-10-07 PROCEDURE — 85025 COMPLETE CBC W/AUTO DIFF WBC: CPT | Performed by: EMERGENCY MEDICINE

## 2024-10-07 PROCEDURE — 99285 EMERGENCY DEPT VISIT HI MDM: CPT

## 2024-10-07 PROCEDURE — 83605 ASSAY OF LACTIC ACID: CPT | Performed by: EMERGENCY MEDICINE

## 2024-10-07 PROCEDURE — 83735 ASSAY OF MAGNESIUM: CPT | Performed by: EMERGENCY MEDICINE

## 2024-10-07 RX ORDER — KETOROLAC TROMETHAMINE 15 MG/ML
15 INJECTION, SOLUTION INTRAMUSCULAR; INTRAVENOUS ONCE
Status: COMPLETED | OUTPATIENT
Start: 2024-10-07 | End: 2024-10-07

## 2024-10-07 RX ORDER — ALPRAZOLAM 1 MG
1 TABLET ORAL ONCE
Status: COMPLETED | OUTPATIENT
Start: 2024-10-07 | End: 2024-10-07

## 2024-10-07 RX ORDER — PHENYTOIN SODIUM 100 MG/1
100 CAPSULE, EXTENDED RELEASE ORAL 4 TIMES DAILY
Qty: 120 CAPSULE | Refills: 0 | Status: SHIPPED | OUTPATIENT
Start: 2024-10-07

## 2024-10-07 RX ORDER — PHENYTOIN 50 MG/1
100 TABLET, CHEWABLE ORAL ONCE
Status: COMPLETED | OUTPATIENT
Start: 2024-10-07 | End: 2024-10-07

## 2024-10-07 RX ORDER — LORAZEPAM 2 MG/ML
INJECTION INTRAMUSCULAR
Status: COMPLETED
Start: 2024-10-07 | End: 2024-10-07

## 2024-10-07 RX ORDER — PHENYTOIN SODIUM 100 MG/1
300 CAPSULE, EXTENDED RELEASE ORAL ONCE
Status: COMPLETED | OUTPATIENT
Start: 2024-10-07 | End: 2024-10-07

## 2024-10-07 RX ORDER — ACETAMINOPHEN 325 MG/1
650 TABLET ORAL ONCE
Status: COMPLETED | OUTPATIENT
Start: 2024-10-07 | End: 2024-10-07

## 2024-10-07 RX ORDER — LORAZEPAM 2 MG/ML
2 INJECTION INTRAMUSCULAR ONCE
Status: COMPLETED | OUTPATIENT
Start: 2024-10-07 | End: 2024-10-07

## 2024-10-07 RX ORDER — LEVETIRACETAM 500 MG/5ML
1000 INJECTION, SOLUTION, CONCENTRATE INTRAVENOUS ONCE
Status: COMPLETED | OUTPATIENT
Start: 2024-10-07 | End: 2024-10-07

## 2024-10-07 RX ORDER — SODIUM CHLORIDE 0.9 % (FLUSH) 0.9 %
10 SYRINGE (ML) INJECTION AS NEEDED
Status: DISCONTINUED | OUTPATIENT
Start: 2024-10-07 | End: 2024-10-07 | Stop reason: HOSPADM

## 2024-10-07 RX ADMIN — ALPRAZOLAM 1 MG: 1 TABLET ORAL at 11:37

## 2024-10-07 RX ADMIN — PHENYTOIN 100 MG: 50 TABLET, CHEWABLE ORAL at 17:07

## 2024-10-07 RX ADMIN — ALPRAZOLAM 1 MG: 1 TABLET ORAL at 17:00

## 2024-10-07 RX ADMIN — LEVETIRACETAM 1000 MG: 100 INJECTION, SOLUTION INTRAVENOUS at 09:23

## 2024-10-07 RX ADMIN — KETOROLAC TROMETHAMINE 15 MG: 15 INJECTION, SOLUTION INTRAMUSCULAR; INTRAVENOUS at 11:38

## 2024-10-07 RX ADMIN — EXTENDED PHENYTOIN SODIUM 300 MG: 100 CAPSULE ORAL at 12:56

## 2024-10-07 RX ADMIN — LORAZEPAM 2 MG: 2 INJECTION INTRAMUSCULAR at 09:07

## 2024-10-07 RX ADMIN — LORAZEPAM 2 MG: 2 INJECTION INTRAMUSCULAR; INTRAVENOUS at 09:07

## 2024-10-07 RX ADMIN — ACETAMINOPHEN 650 MG: 325 TABLET ORAL at 11:37

## 2024-10-07 NOTE — DISCHARGE INSTRUCTIONS
I have written a prescription for a month supply of Dilantin.  Take that 4 times a day.  [facility] or call [facility] connection number to establish with a primary care provider.  Return anytime if any concerns.  You may also show up anytime at [facility] whose number I have provided.  You may also show up at [facility] anytime, they have a 24-hour psychiatric treatment facility.

## 2024-10-07 NOTE — CONSULTS
Bryant Carvajal Grey  1980    Preferred Pronouns: he/him   Race/Ethnicity: White or   Martial Status: Single  Guardian Name/Contact/etc: Self  Pt Lives With:  Girlfriend and her 2 kids   Occupation: full time job as an    Appearance: clean and casually dressed, appropriate     Time Called for Assessment: 15:15  Assessment Start and End: 16:43 - 17:05      DATA:   Clinician received a call from [facility] staff for a behavioral health consult.  The patient is agreeable to speak with the behavioral health team.  Met with patient at bedside. Patient is under 1:1 security monitoring.  The attending treatment team is TIFFANY Martin and Ruben Rivas MD, Provider.  Patient presents today with chief compliant of suicidal ideation and substance abuse. Clinician completed assessment with patient and observations are documented as follows.    ASSESSMENT:    Clinician consulted with patient for mental status exam and assessment.  Clinical descriptors are documented as follows.  Clinician completed CSSRS with patient for suicide risk assessment.  The results of patient’s CSSRS documented as follows.    Presenting Problems: Patient reported to the ER due to seizures from taking too much insulin as he is a diabetic. Patient reported to ER nurse that he took about 300 units of insulin and was unsure if he did it to kill himself or not. Patient denies being suicidal or having any suicidal thoughts reporting that he was drunk when he said that.     Current Stressors: mental health condition     Established Therapy, Medication Management or Other Mental Health Services: Patient denies   Current Psychiatric Medications: Patient denies     Mental Status Exam:  Behavior: Agitated  Psychomotor Movement: Aggitated  Attention and Cooperation: Normal and  minimal   Mood: agitated and Affect: Appropriate  Orientation: alert and oriented to person, place, and time   Thought Process: linear, logical, and goal  directed  Thought Content: normal  Delusions:  none    Hallucinations: None   Concentration: Normal  Suicidal Ideation: Absent  Homicidal Ideation: Absent  Hopelessness: no  Speech: Normal  Eye Contact: Fair  Insight: fair  Judgement: fair     Hx of Psychiatric or Detox Hospitalizations:  No    Suicidal Ideation Assessment:    [place]-SUICIDE SEVERITY RATING SCALE  Psychiatric Inpatient Setting - Discharge Screener    Ask questions that are bold and underlined Discharge   Ask Questions 1 and 2 YES NO   Wish to be Dead:   Person endorses thoughts about a wish to be dead or not alive anymore, or wish to fall asleep and not wake up.  While you were here in the hospital, have you wished you were dead or wished you could go to sleep and not wake up?  x   Suicidal Thoughts:   General non-specific thoughts of wanting to end one's life/die by suicide, “I've thought about killing myself” without general thoughts of ways to kill oneself/associated methods, intent, or plan.   While you were here in the hospital, have you actually had thoughts about killing yourself?   x   If YES to 2, ask questions 3, 4, 5, and 6.  If NO to 2, go directly to question 6   3) Suicidal Thoughts with Method (without Specific Plan or Intent to Act):   Person endorses thoughts of suicide and has thought of a least one method during the assessment period. This is different than a specific plan with time, place or method details worked out. “I thought about taking an overdose but I never made a specific plan as to when where or how I would actually do it….and I would never go through with it.”   Have you been thinking about how you might kill yourself?      4) Suicidal Intent (without Specific Plan):   Active suicidal thoughts of killing oneself and patient reports having some intent to act on such thoughts, as opposed to “I have the thoughts but I definitely will not do anything about them.”   Have you had these thoughts and had some intention of  acting on them or do you have some intention of acting on them after you leave the hospital?      5) Suicide Intent with Specific Plan:   Thoughts of killing oneself with details of plan fully or partially worked out and person has some intent to carry it out.   Have you started to work out or worked out the details of how to kill yourself either for while you were here in the hospital or for after you leave the hospital? Do you intend to carry out this plan?        6) Suicide Behavior    While you were here in the hospital, have you done anything, started to do anything, or prepared to do anything to end your life?    Examples: Took pills, cut yourself, tried to hang yourself, took out pills but didn't swallow any because you changed your mind or someone took them from you, collected pills, secured a means of obtaining a gun, gave away valuables, wrote a will or suicide note, etc.  x     Suicidal: Absent   Previous Attempts:  patient denies       Psychosocial History    Highest Level of Education: college graduate   Family Hx of Mental Health/Substance Abuse: No  Patient Identified Support System (List family members, loved ones, guardians, friends, etc): Girlfriend, his kids, and girlfriends kids     Legal History / History of Violence: None known   Experience with Interpersonal Violence: No  History of Inappropriate Sexual Behavior: No  Current Medical Conditions or Biomedical Complications: Yes, patient reports he is a diabetic     Social Determinants of Health  Housing Instability and/or Utility Needs: No  Food Insecurity: No  Transportation Needs: No    Substance Use History  Active Use: Yes, describe: patient reports to drinking half a pint of vodka this morning.    If yes, what is active: Alcohol      PLAN:  At this time, clinician recommends outpatient treatment based upon the above assessment. Spoke with Dr. Breen about patients reports. Informed Dr. Breen that patient denied SI during assessment and is  requesting discharge. Clinician collaborated with the treatment team who agree to adopt the recommendations.  Clinician discussed recommendations with patient and/or patient support systems, and patient is agreeable to the plan.  Patient is agreeable for referrals to be sent to facilities and agencies for treatment.    Safety Planning:  Clinician verbally engaged in safety planning by assisting the patient in identifying risk factors that would indicate the need for higher level of care, such as thoughts to harm self or others and/or self-harming behavior(s). Safety plan of report to nearest hospital, calling local police or 911 if feeling unsafe, if having suicidal or homicidal thoughts, or if in emergent need of medications verbally reviewed with patient during assessment and suicide prevention/crisis hotlines verbally reviewed with patient during assessment. Patient during assessment verbally agreed to safety plan. Reviewed resources of crisis hotlines or presenting to the nearest emergency department should symptoms worsen, or in any crisis/emergency. Patient agreeable and voiced understanding.     Nicolette Souza [place] [facility]

## 2024-10-07 NOTE — ED PROVIDER NOTES
Subjective   History of Present Illness  Mr Edmonds is brought by EMS with altered mental status.  His roommate told EMS that he came to her asking for help this morning.  He told her he thought he might have taken too much insulin he then collapsed on the floor.  EMS reports he was having a generalized seizure on their arrival.  They report fingerstick blood sugar in the 60s.  He is not able to provide any history.  His roommate advised that he is trying to get off alcohol and drugs.  He brings with him a discharge summary from [facility] in [place] a month ago.  He had an 11-day stay for alcohol withdrawal.  On his medicine list he has blood pressure medicines and Dilantin.  He also takes insulin for diabetes.      Review of Systems    No past medical history on file.    No Known Allergies    No past surgical history on file.    No family history on file.    Social History     Socioeconomic History    Marital status: Single           Objective   Physical Exam  Vitals and nursing note reviewed.   Constitutional:       General: He is not in acute distress.     Appearance: Normal appearance.      Comments: On initial exam he is unresponsive with occasional episodes of straining and crying out.  After a short time he becomes fully awake and alert.  On multiple rechecks over many hours in the emergency department he is awake and alert.   HENT:      Head: Normocephalic and atraumatic.      Nose: Nose normal. No congestion or rhinorrhea.   Eyes:      General: No scleral icterus.     Conjunctiva/sclera: Conjunctivae normal.   Neck:      Comments: No JVD   Cardiovascular:      Rate and Rhythm: Normal rate and regular rhythm.      Heart sounds: No murmur heard.     No friction rub.   Pulmonary:      Effort: Pulmonary effort is normal.      Breath sounds: Normal breath sounds. No wheezing or rales.   Abdominal:      General: Bowel sounds are normal.      Palpations: Abdomen is soft.      Tenderness: There is no  abdominal tenderness. There is no guarding or rebound.   Musculoskeletal:         General: No tenderness.      Cervical back: Normal range of motion and neck supple.      Right lower leg: No edema.      Left lower leg: No edema.   Skin:     General: Skin is warm and dry.      Coloration: Skin is not pale.      Findings: No erythema.   Neurological:      General: No focal deficit present.      Mental Status: He is alert.      Motor: No weakness.      Coordination: Coordination normal.      Comments: Exam nonfocal.  He is sitting up talking answering questions appropriately.   Psychiatric:         Mood and Affect: Mood normal.         Behavior: Behavior normal.         Thought Content: Thought content normal.         Procedures           ED Course  ED Course as of 10/08/24 0656   Mon Oct 07, 2024   0913 I saw him on arrival and interviewed paramedics.  Reviewed the paperwork brought with him.  Have ordered Keppra and Ativan. [DT]   0920 He was agitated and having episodes of yelling out interspersed with being unresponsive.  We gave Ativan.  He now is awake alert and talking.  He tells me he only takes his Dilantin intermittently and confirms history of seizures.  He tells me he drank a lot last night and that is why he took extra insulin this morning.  He tells me he took Coreg for his blood pressure.  He tells me he is taking benzodiazepines regularly as well.  He reports history of prior heart attack and multiple cardiac stents.  He denies reports tightness in his chest although tells me it does not feel like his heart.  Obtained stat EKG and shows LVH but no evidence of acute ischemia or infarct.  Will load with Keppra. [DT]   0933 His nurse advises that he has voiced suicidal ideation to her and is trying to leave.  Will initiate 72-hour hold and suicide precautions [DT]   1020 129/96, heart rate 89.  Alcohol level 239.  He is asking for pain medication.  Dilantin level is nondetectable. [DT]   1030 Has refused  CT and chest x-ray. [DT]   1123 He is awake and alert.  I spoke with him about findings.  He does not feel this was an alcohol withdrawal seizure.  He admits he has not been taking his Dilantin.  He confirms that he has history of epilepsy attributed to his multiple head injuries.  He tells me he has only resumed drinking for about 4 days.  I discussed with him suicidal ideation.  He tells me it is chronic.  He tells me that he has considered giving himself a very large dose of insulin.  He denies any acute change in that.  [facility] and is due for a milligram, will give that.  Requests pain medication, have ordered Tylenol.  Will give Toradol.  I advised him of my plan to avoid narcotics.  I advised him that I would like him to speak with behavioral health but must wait till his alcohol level is around the 100 before consulting them.  He is agreeable to stay at this point. [DT]   1126 He denies allergies. [DT]   1129 Consulted the pharmacist.  Neither IV Cerebyx nor IV Dilantin is available.  Will give 300 mg loading dose of oral Dilantin [DT]   1512 Alcohol level 103, he is resting comfortably.  Will consult behavioral health. [DT]   1515 Spoke with Latoya at the behavioral health office and she will interview Mr. Edmonds.  Currently does not have a pharmacy.  I have printed a prescription for a month supply of Dilantin at 100 mg 4 times daily which is what he tells me his doses.  I will give him that prescription so that wherever he ends up he will be able to get that.  He tells me he also takes Xanax 4 times a day which is [facility] through the [facility], do not see that on his F[facility].  He will need to contact the [facility] for that. [DT]   1607 He tells us he is ready to go at this point.  I have spoken with him and advised him that we are awaiting psychiatric evaluation.  I advised him of her reason, that being his suicidal ideation.  He tells me he only said that because he was drunk  and is ready to go home.  I have convinced him to stay at this point.  He is currently under 72-hour hold and I have made it clear to him that he may not leave until evaluated by psychiatry.  He tells me he has plenty of Dilantin at home.  Prescription he is at bedside. [DT]   1702 Patient was evaluated by the psychiatry service.  Patient is not actively suicidal at this time and is not on a psychiatric hold.  Dr. Rivas had arranged patient's discharge documentation pending psychiatry recommendation.  Patient is comfortable with discharge at this time, aware of outpatient recommendations, and understands to have a low threshold to return to the emergency department if symptoms return or other concerns arise. [CP]      ED Course User Index  [CP] Tc Grimes DO  [DT] Ruben Rivas MD                                             Medical Decision Making  Please see course notes.  I saw him on arrival, interviewed paramedics.  Had numerous reevaluations.  Observed him for over 7 hours in the emergency department.  Gave multiple IV and oral medications.    Problems Addressed:  Alcoholic intoxication with complication: complicated acute illness or injury  Generalized seizure: complicated acute illness or injury  Subtherapeutic phenytoin level: complicated acute illness or injury  Suicidal ideation: complicated acute illness or injury  Uncontrolled hypertension: complicated acute illness or injury    Amount and/or Complexity of Data Reviewed  External Data Reviewed: notes.  Labs: ordered. Decision-making details documented in ED Course.  Radiology: ordered. Decision-making details documented in ED Course.  ECG/medicine tests: ordered. Decision-making details documented in ED Course.    Risk  OTC drugs.  Prescription drug management.        Final diagnoses:   Alcoholic intoxication with complication   Generalized seizure   Uncontrolled hypertension   Subtherapeutic phenytoin level   Suicidal ideation       ED  Disposition  ED Disposition       ED Disposition   Discharge    Condition   Stable    Comment   --               [facility]  [address] [place] [address]        PATIENT CONNECTION - [facility] [id number]  [phone]        [facility]  [address] [place] [address] [phone]             Medication List        New Prescriptions      phenytoin  MG capsule  Commonly known as: DILANTIN  Take 1 capsule by mouth 4 (Four) Times a Day.               Where to Get Your Medications        You can get these medications from any pharmacy    Bring a paper prescription for each of these medications  phenytoin  MG capsule            Ruben Rivas MD  10/08/24 0656

## 2024-10-08 LAB
BACTERIA SPEC AEROBE CULT: NO GROWTH
QT INTERVAL: 386 MS
QTC INTERVAL: 467 MS